# Patient Record
Sex: FEMALE | Race: WHITE | NOT HISPANIC OR LATINO | Employment: OTHER | URBAN - METROPOLITAN AREA
[De-identification: names, ages, dates, MRNs, and addresses within clinical notes are randomized per-mention and may not be internally consistent; named-entity substitution may affect disease eponyms.]

---

## 2017-01-20 ENCOUNTER — DOCUMENTATION ONLY (OUTPATIENT)
Dept: FAMILY MEDICINE | Facility: CLINIC | Age: 52
End: 2017-01-20

## 2017-01-23 ENCOUNTER — OFFICE VISIT (OUTPATIENT)
Dept: FAMILY MEDICINE | Facility: CLINIC | Age: 52
End: 2017-01-23
Payer: COMMERCIAL

## 2017-01-23 VITALS
SYSTOLIC BLOOD PRESSURE: 114 MMHG | WEIGHT: 274.5 LBS | BODY MASS INDEX: 41.6 KG/M2 | TEMPERATURE: 99 F | DIASTOLIC BLOOD PRESSURE: 75 MMHG | HEIGHT: 68 IN | HEART RATE: 79 BPM

## 2017-01-23 DIAGNOSIS — Z23 IMMUNIZATION DUE: ICD-10-CM

## 2017-01-23 DIAGNOSIS — E66.01 OBESITY, CLASS III, BMI 40-49.9 (MORBID OBESITY): ICD-10-CM

## 2017-01-23 DIAGNOSIS — Z00.00 ROUTINE MEDICAL EXAM: Primary | ICD-10-CM

## 2017-01-23 PROCEDURE — 99999 PR PBB SHADOW E&M-EST. PATIENT-LVL III: CPT | Mod: PBBFAC,,, | Performed by: FAMILY MEDICINE

## 2017-01-23 PROCEDURE — 90471 IMMUNIZATION ADMIN: CPT | Mod: S$GLB,,, | Performed by: FAMILY MEDICINE

## 2017-01-23 PROCEDURE — 90715 TDAP VACCINE 7 YRS/> IM: CPT | Mod: S$GLB,,, | Performed by: FAMILY MEDICINE

## 2017-01-23 PROCEDURE — 99396 PREV VISIT EST AGE 40-64: CPT | Mod: 25,S$GLB,, | Performed by: FAMILY MEDICINE

## 2017-01-23 RX ORDER — NAPROXEN SODIUM 220 MG
220 TABLET ORAL 2 TIMES DAILY PRN
COMMUNITY
End: 2018-03-23

## 2017-01-23 NOTE — MR AVS SNAPSHOT
[facility]  [address]  [place] [address]  Phone: [phone]  Fax: [phone]                  Leandra CORTES Moses   2017 3:40 PM   Office Visit    Description:  Female : 1965   Provider:  Rohini Bella MD   Department:  [facility]           Reason for Visit     Annual Exam           Diagnoses this Visit        Comments    Routine medical exam    -  Primary     Immunization due                To Do List           Future Appointments        Provider Department Dept Phone    2017 8:30 AM [facility], INT MED INJ [facility] [phone]    2017 10:00 AM LAB, [facility] [phone]    3/25/2017 8:30 AM [facility], INT MED INJ [facility] [phone]      Goals (5 Years of Data)     None      Follow-Up and Disposition     Return in about 1 year (around 2018) for routine medical exam.      [facility] On Call     [facility] On Call Nurse [facility] -  Assistance  Registered nurses in the [facility] Call Center provide clinical advisement, health education, appointment booking, and other advisory services.  Call for this free service at [phone].             Medications           Message regarding Medications     Verify the changes and/or additions to your medication regime listed below are the same as discussed with your clinician today.  If any of these changes or additions are incorrect, please notify your healthcare provider.             Verify that the below list of medications is an accurate representation of the medications you are currently taking.  If none reported, the list may be blank. If incorrect, please contact your healthcare provider. Carry this list with you in case of emergency.           Current Medications     anastrozole (ARIMIDEX) 1 mg Tab Take 1 mg by mouth once daily.    glucosam-chond-hyalu-CF borate (MOVE FREE Snagsta) 750 mg-100 mg- 1.65 mg-108 mg Tab Take 1 tablet by  "mouth once daily.    multivitamin capsule Take 1 capsule by mouth once daily.    naproxen sodium (ALEVE) 220 MG tablet Take 220 mg by mouth 2 (two) times daily as needed.    zinc gluconate 50 mg tablet Take 50 mg by mouth once daily.           Clinical Reference Information           Vital Signs - Last Recorded  Most recent update: 1/23/2017  3:41 PM by Brigid Carney MA    BP Pulse Temp Ht Wt LMP    114/75 79 98.6 °F (37 °C) (Oral) 5' 7.5" (1.715 m) 124.5 kg (274 lb 7.6 oz) 02/13/2014    BMI                42.35 kg/m2          Blood Pressure          Most Recent Value    BP  114/75      Allergies as of 1/23/2017     Morphine    Adhesive    Aspirin    Penicillin G    Corticosteroids (Glucocorticoids)    Morphine Sulfate      Immunizations Administered on Date of Encounter - 1/23/2017     Name Date Dose VIS Date Route    Pneumococcal Conjugate - 13 Valent  Incomplete 0.5 mL 11/5/2015 Intramuscular    Pneumococcal Polysaccharide - 23 Valent  Incomplete 0.5 mL 4/24/2015 Intramuscular    TDAP 1/23/2017 0.5 mL 2/24/2015 Intramuscular      Orders Placed During Today's Visit      Normal Orders This Visit    Pneumococcal Conjugate Vaccine (13 Valent) (IM)     Pneumococcal Polysaccharide Vaccine (23 Valent) (SQ/IM)     Tdap Vaccine (Adult)     Future Labs/Procedures Expected by Expires    CBC auto differential  1/23/2017 3/24/2018    Comprehensive metabolic panel  1/23/2017 3/24/2018    Lipid panel  1/23/2017 3/24/2018    TSH  1/23/2017 3/24/2018      "

## 2017-01-23 NOTE — PROGRESS NOTES
CHIEF COMPLAINT:  Annual exam      HISTORY OF PRESENT ILLNESS:  Leandra Lopes is a 51 y.o. female who presents to clinic for an annual exam.  She is due for screening labs. She does not know if her cervix was removed with her hysterectomy.    HEALTH MAINTENANCE:  1. Patient does not receive mammograms  2. Influenza up to date  3. prevnar and pneumovax are not up to date  4. Colonoscopy , repeat in 3 years  5. Patient follows up with dentist, optometrist        REVIEW OF SYSTEMS:  The patient denies any fever, chills, night sweats, headaches, vision changes, difficulty speaking or swallowing, decreased hearing, weight loss, weight gain, chest pain, palpitations, shortness of breath, cough, nausea, vomiting, abdominal pain, dysuria, diarrhea, constipation, hematuria, hematochezia, melena, changes in her hair, skin, nails, numbness or weakness in her extremities, erythema,  swelling over any of her joints, myalgias, swollen glands, easy bruising, fatigue, edema, symptoms of anxiety or depression. She denies any vaginal discharge, breast masses, nipple discharge, change in the skin overlying her breasts.      MEDICATIONS:   Reviewed and/or reconciled in EPIC    ALLERGIES:  Reviewed and/or reconciled in [facility]    PAST MEDICAL/SURGICAL HISTORY:   Past Medical History   Diagnosis Date    Breast cancer 2013     left breast infiltrating ductal carcinoma, ER/NH positive, Her2 negative    SADIE (obstructive sleep apnea)      on cpap      Past Surgical History   Procedure Laterality Date    Tonsillectomy      Tubal ligation      Cholecystectomy      Breast biopsy  2013     left breast- IDC    Breast surgery  10//     bilateral mastectomy and reconstruction-no implants     section       x3    Colon surgery  2009     benign neoplasm recheck 2014, 12 inches    Hysterectomy       TVH/BSO, benign       FAMILY HISTORY:    Family History   Problem Relation Age of Onset    Cancer Mother      liver, bone,  "breast    Hypertension Mother     Heart disease Mother     Breast cancer Mother 63     initial dx ILC at 63 with recurrence at 67, 2nd CA IDC at 67    Cancer Father 62     bone ? breast    Diabetes Father     Breast cancer Maternal Aunt 50    Ovarian cancer Cousin 49     daughter of M Aunt with Breast CA       SOCIAL HISTORY:    Social History     Social History    Marital status:      Spouse name: N/A    Number of children: N/A    Years of education: N/A     Occupational History    Not on file.     Social History Main Topics    Smoking status: Never Smoker    Smokeless tobacco: Never Used    Alcohol use Yes      Comment: rarely    Drug use: No    Sexual activity: Yes     Partners: Male     Birth control/ protection: Surgical     Other Topics Concern    Not on file     Social History Narrative     She tries to eat a healthy diet, she does not get any regular exercise during the school year but does swim in the summer.    PHYSICAL EXAM:  VITAL SIGNS:   Vitals:    01/23/17 1537   BP: 114/75   Pulse: 79   Temp: 98.6 °F (37 °C)   TempSrc: Oral   Weight: 124.5 kg (274 lb 7.6 oz)   Height: 5' 7.5" (1.715 m)     GENERAL:  Patient appears well nourished, sitting on exam table, in no acute distress.  HEENT:  Atraumatic, normocephalic, PERRLA, EOMI, no conjunctival injection, sclerae are anicteric, normal external auditory canals,TMs clear b/l, gross hearing intact to whisper, MMM, no oropharygneal erythema or exudate.  NECK:  Supple, normal ROM, trachea is midline , no supraclavicular or cervical LAD or masses palpated.  Thyroid gland not palpable.  CARDIOVASCULAR:  RRR, normal S1 and S2, no m/r/g.  RESPIRATORY:  CTA b/l, no wheezes, rhonchi, rales.  No increased work of breathing, no  use of accessory muscles.  ABDOMEN:  Soft, nontender, nondistended, normoactive bowel sounds in all four quadrants, no rebound or guarding, no HSM or masses palpated.  Normal percussion.  EXTREMITIES:  2+ DP pulses b/l, " no edema.  SKIN:  Warm, no lesions on exposed skin.  NEUROMUSCULAR:  No tenderness over cervical, thoracic, lumbar spinous processes, paraspinal muscles, SI joints. Normal ROM over cervical and lumbar spines.  4+/5 hip, knee, ankle flexors/extensors  b/l.  2+ patellar and achilles reflexes b/l.  Steady gait.  Patient is able to walk on heels and toes. Cranial nerves II-XII grossly intact.  No clubbing or cyanosis of digits/nails.      PSYCH:  Patient is alert and oriented to person, time, place. They are appropriately dressed and groomed. There is normal eye contact. Rate and tone of speech is normal. Normal insight, judgement. Normal thought content and process.     LABORATORY/IMAGING STUDIES: pending    ASSESSMENT/PLAN: This is a 51 y.o. female who presents to clinic for routine medical exam  1. Routine medical exam, immunization due: We will check a screening CBC, CMP, TSH, fasting lipid panel. Patient will be notified of these results and the need for any further evaluation and treatment.  She will follow up with her dentist/optometrist as scheduled. We discussed the importance of increasing her exercise, continuing with a healthy, well-balanced diet. She declines pneumovax and prevnar at this time, but will call to schedule a nursing visit. She will receive an adacel today  2. Obesity: see below      Patient readiness: acceptance and barriers:none    During the course of the visit the patient was educated and counseled about the following:     Obesity:   General weight loss/lifestyle modification strategies discussed (elicit support from others; identify saboteurs; non-food rewards, etc).  Diet interventions: moderate (500 kCal/d) deficit diet.  Informal exercise measures discussed, e.g. taking stairs instead of elevator.  Regular aerobic exercise program discussed.    Goals: Obesity: Reduce calorie intake and BM    Did patient meet goals/outcomes: No    The following self management tools provided:  declined    Patient Instructions (the written plan) was given to the patient/family.     Time spent with patient: 30 minutes        Rohini Bella MD

## 2017-01-28 ENCOUNTER — LAB VISIT (OUTPATIENT)
Dept: LAB | Facility: HOSPITAL | Age: 52
End: 2017-01-28
Attending: FAMILY MEDICINE
Payer: COMMERCIAL

## 2017-01-28 DIAGNOSIS — Z00.00 ROUTINE MEDICAL EXAM: ICD-10-CM

## 2017-01-28 LAB
ALBUMIN SERPL BCP-MCNC: 3.8 G/DL
ALP SERPL-CCNC: 129 U/L
ALT SERPL W/O P-5'-P-CCNC: 20 U/L
ANION GAP SERPL CALC-SCNC: 9 MMOL/L
AST SERPL-CCNC: 19 U/L
BASOPHILS # BLD AUTO: 0.01 K/UL
BASOPHILS NFR BLD: 0.2 %
BILIRUB SERPL-MCNC: 0.6 MG/DL
BUN SERPL-MCNC: 18 MG/DL
CALCIUM SERPL-MCNC: 9.3 MG/DL
CHLORIDE SERPL-SCNC: 107 MMOL/L
CHOLEST/HDLC SERPL: 4.3 {RATIO}
CO2 SERPL-SCNC: 27 MMOL/L
CREAT SERPL-MCNC: 0.7 MG/DL
DIFFERENTIAL METHOD: ABNORMAL
EOSINOPHIL # BLD AUTO: 0.2 K/UL
EOSINOPHIL NFR BLD: 2.8 %
ERYTHROCYTE [DISTWIDTH] IN BLOOD BY AUTOMATED COUNT: 13.9 %
EST. GFR  (AFRICAN AMERICAN): >60 ML/MIN/1.73 M^2
EST. GFR  (NON AFRICAN AMERICAN): >60 ML/MIN/1.73 M^2
GLUCOSE SERPL-MCNC: 94 MG/DL
HCT VFR BLD AUTO: 40.2 %
HDL/CHOLESTEROL RATIO: 23.3 %
HDLC SERPL-MCNC: 163 MG/DL
HDLC SERPL-MCNC: 38 MG/DL
HGB BLD-MCNC: 12.7 G/DL
LDLC SERPL CALC-MCNC: 105.8 MG/DL
LYMPHOCYTES # BLD AUTO: 1.8 K/UL
LYMPHOCYTES NFR BLD: 33 %
MCH RBC QN AUTO: 28.8 PG
MCHC RBC AUTO-ENTMCNC: 31.6 %
MCV RBC AUTO: 91 FL
MONOCYTES # BLD AUTO: 0.5 K/UL
MONOCYTES NFR BLD: 10 %
NEUTROPHILS # BLD AUTO: 2.9 K/UL
NEUTROPHILS NFR BLD: 54 %
NONHDLC SERPL-MCNC: 125 MG/DL
PLATELET # BLD AUTO: 191 K/UL
PMV BLD AUTO: 12.1 FL
POTASSIUM SERPL-SCNC: 4.3 MMOL/L
PROT SERPL-MCNC: 6.9 G/DL
RBC # BLD AUTO: 4.41 M/UL
SODIUM SERPL-SCNC: 143 MMOL/L
TRIGL SERPL-MCNC: 96 MG/DL
TSH SERPL DL<=0.005 MIU/L-ACNC: 0.82 UIU/ML
WBC # BLD AUTO: 5.39 K/UL

## 2017-01-28 PROCEDURE — 80061 LIPID PANEL: CPT

## 2017-01-28 PROCEDURE — 36415 COLL VENOUS BLD VENIPUNCTURE: CPT | Mod: PO

## 2017-01-28 PROCEDURE — 80053 COMPREHEN METABOLIC PANEL: CPT

## 2017-01-28 PROCEDURE — 85025 COMPLETE CBC W/AUTO DIFF WBC: CPT

## 2017-01-28 PROCEDURE — 84443 ASSAY THYROID STIM HORMONE: CPT

## 2017-03-22 ENCOUNTER — OFFICE VISIT (OUTPATIENT)
Dept: PODIATRY | Facility: CLINIC | Age: 52
End: 2017-03-22
Payer: COMMERCIAL

## 2017-03-22 VITALS — WEIGHT: 276 LBS | HEIGHT: 68 IN | BODY MASS INDEX: 41.83 KG/M2

## 2017-03-22 DIAGNOSIS — B35.1 ONYCHOMYCOSIS DUE TO DERMATOPHYTE: Primary | ICD-10-CM

## 2017-03-22 PROCEDURE — 1160F RVW MEDS BY RX/DR IN RCRD: CPT | Mod: S$GLB,,, | Performed by: PODIATRIST

## 2017-03-22 PROCEDURE — 99999 PR PBB SHADOW E&M-EST. PATIENT-LVL II: CPT | Mod: PBBFAC,,, | Performed by: PODIATRIST

## 2017-03-22 PROCEDURE — 99203 OFFICE O/P NEW LOW 30 MIN: CPT | Mod: S$GLB,,, | Performed by: PODIATRIST

## 2017-03-22 RX ORDER — CICLOPIROX 80 MG/ML
SOLUTION TOPICAL NIGHTLY
Qty: 6.6 ML | Refills: 11 | Status: SHIPPED | OUTPATIENT
Start: 2017-03-22 | End: 2018-03-23

## 2017-03-22 NOTE — MR AVS SNAPSHOT
[facility]  [address]  [place] [address]  Phone: [phone]                  Leandra CORTES Moses   3/22/2017 2:45 PM   Office Visit    Description:  Female : 1965   Provider:  Roberto Carlos Gamble DPM   Department:  [facility]           Reason for Visit     Nail Problem           Diagnoses this Visit        Comments    Onychomycosis due to dermatophyte    -  Primary            To Do List           Future Appointments        Provider Department Dept Phone    3/22/2017 2:45 PM Roberto Carlos Gamble DPM [facility] [phone]    3/25/2017 8:30 AM [facility] INT MED INJ [facility] [phone]      Goals (5 Years of Data)     None      Follow-Up and Disposition     Return if symptoms worsen or fail to improve.       These Medications        Disp Refills Start End    ciclopirox (PENLAC) 8 % Soln 6.6 mL 11 3/22/2017     Apply topically nightly. - Topical    Pharmacy: [facility]/pharmacy #[id number] - [place] - [address]  Ph #: [phone]         [facility] On Call     [facility] On Call Nurse Care Line -  Assistance  Registered nurses in the [facility] On Call Center provide clinical advisement, health education, appointment booking, and other advisory services.  Call for this free service at [phone].             Medications           Message regarding Medications     Verify the changes and/or additions to your medication regime listed below are the same as discussed with your clinician today.  If any of these changes or additions are incorrect, please notify your healthcare provider.        START taking these NEW medications        Refills    ciclopirox (PENLAC) 8 % Soln 11    Sig: Apply topically nightly.    Class: Normal    Route: Topical           Verify that the below list of medications is an accurate representation of the medications you are currently taking.  If none reported, the list may be blank. If incorrect, please contact your healthcare provider.  "Carry this list with you in case of emergency.           Current Medications     anastrozole (ARIMIDEX) 1 mg Tab Take 1 mg by mouth once daily.    ciclopirox (PENLAC) 8 % Soln Apply topically nightly.    glucosam-chond-hyalu-CF borate (MOVE FREE Mobypark) 750 mg-100 mg- 1.65 mg-108 mg Tab Take 1 tablet by mouth once daily.    multivitamin capsule Take 1 capsule by mouth once daily.    naproxen sodium (ALEVE) 220 MG tablet Take 220 mg by mouth 2 (two) times daily as needed.    zinc gluconate 50 mg tablet Take 50 mg by mouth once daily.           Clinical Reference Information           Your Vitals Were     Height Weight Last Period BMI       5' 7.5" (1.715 m) 125.2 kg (276 lb 0.3 oz) 02/13/2014 42.59 kg/m2       Allergies as of 3/22/2017     Morphine    Adhesive    Aspirin    Penicillin G    Corticosteroids (Glucocorticoids)    Morphine Sulfate      Immunizations Administered on Date of Encounter - 3/22/2017     None      Language Assistance Services     ATTENTION: Language assistance services are available, free of charge. Please call [phone].      ATENCIÓN: Si timi damon, tiene a omalley disposición servicios gratuitos de asistencia lingüística. Llame al [phone].     LAURA Ý: N?u b?n nói Ti?ng Vi?t, có các d?ch v? h? tr? ngôn ng? mi?n phí dành cho b?n. G?i s? [phone].         [facility] complies with applicable Federal civil rights laws and does not discriminate on the basis of race, color, national origin, age, disability, or sex.        "

## 2017-03-22 NOTE — PROGRESS NOTES
Subjective:      Patient ID: Leandra Lopes is a 51 y.o. female.    Chief Complaint: Nail Problem (discolored Lt great toe )    Discoloration left hallux and 2nd toenails.  Gradual onset, worsening over past several months-year, aggravated by increased weight bearing, shoe gear, pressure.  No previous medical treatment.  OTC pain med not helping.  Relates trauma left hallux a long time ago healed well with some residual nail changes.  Denies surgery left hallux/2nd toe.    Review of Systems   Constitution: Negative for chills, diaphoresis, fever, malaise/fatigue and night sweats.   Cardiovascular: Negative for claudication, cyanosis, leg swelling and syncope.   Skin: Positive for nail changes. Negative for color change, dry skin, rash, suspicious lesions and unusual hair distribution.   Musculoskeletal: Negative for falls, joint pain, joint swelling, muscle cramps, muscle weakness and stiffness.   Gastrointestinal: Negative for constipation, diarrhea, nausea and vomiting.   Neurological: Negative for brief paralysis, disturbances in coordination, focal weakness, numbness, paresthesias, sensory change and tremors.           Objective:      Physical Exam   Constitutional: She appears well-developed and well-nourished. She is cooperative. No distress.   Cardiovascular:   Pulses:       Popliteal pulses are 2+ on the right side, and 2+ on the left side.        Dorsalis pedis pulses are 2+ on the right side, and 2+ on the left side.        Posterior tibial pulses are 2+ on the right side, and 2+ on the left side.   Capillary refill 3 seconds all toes/distal feet, all toes/both feet warm to touch.      Negative lymphadenopathy bilateral popliteal fossa and tarsal tunnel.      Negavie lower extremity edema bilateral.     Musculoskeletal:        Right ankle: Normal. She exhibits normal range of motion, no swelling, no ecchymosis, no deformity, no laceration and normal pulse. Achilles tendon normal. Achilles tendon exhibits no  pain, no defect and normal Oliveros's test results.   Normal angle, base, station of gait. All ten toes without clubbing, cyanosis, or signs of ischemia.  No pain to palpation bilateral lower extremities.  Range of motion, stability, muscle strength, and muscle tone normal bilateral feet and legs.     Lymphadenopathy: No inguinal adenopathy noted on the right or left side.   Negative lymphadenopathy bilateral popliteal fossa and tarsal tunnel.   Neurological: She is alert. She has normal strength. She displays no atrophy and no tremor. No sensory deficit. She exhibits normal muscle tone. She displays no seizure activity. Gait normal.   Reflex Scores:       Patellar reflexes are 2+ on the right side and 2+ on the left side.       Achilles reflexes are 2+ on the right side and 2+ on the left side.  Negative tinel sign to percussion sural, superficial peroneal, deep peroneal, saphenous, and posterior tibial nerves right and left ankles and feet.     Skin: Skin is warm, dry and intact. No abrasion, no bruising, no burn, no ecchymosis, no laceration, no lesion and no rash noted. She is not diaphoretic. No cyanosis or erythema. No pallor. Nails show no clubbing.     Skin is normal age and health appropriate color, turgor, texture, and temperature bilateral lower extremities without ulceration, hyperpigmentation, discoloration, masses nodules or cords palpated.  No ecchymosis, erythema, edema, or cardinal signs of infection bilateral lower extremities.    Toenails 1st, 2nd  left are  discolored tanish brown with minimal tan crumbly subungual debris.  Neatly trimmed and not tender to distal nail plate pressure, without periungual skin abnormality of each.               Assessment:       Encounter Diagnosis   Name Primary?    Onychomycosis due to dermatophyte Yes         Plan:       Leandra was seen today for nail problem.    Diagnoses and all orders for this visit:    Onychomycosis due to dermatophyte    Other orders  -      ciclopirox (PENLAC) 8 % Soln; Apply topically nightly.      I counseled the patient on her conditions, their implications and medical management.        Discussed conservative treatment with shoes of adequate dimensions, material, and style to alleviate symptoms and delay or prevent surgical intervention.    Rx penlac.    Discussed and patient verbalizes understanding that changes from old trauma will not improve with antifungal therapy.          Return if symptoms worsen or fail to improve.

## 2017-07-24 ENCOUNTER — HOSPITAL ENCOUNTER (OUTPATIENT)
Dept: RADIOLOGY | Facility: HOSPITAL | Age: 52
Discharge: HOME | End: 2017-07-24
Attending: SPECIALIST
Payer: COMMERCIAL

## 2017-07-24 DIAGNOSIS — Z78.0 MENOPAUSE: ICD-10-CM

## 2017-07-24 DIAGNOSIS — Z78.0 MENOPAUSE: Primary | ICD-10-CM

## 2017-07-24 PROCEDURE — 77080 DXA BONE DENSITY AXIAL: CPT | Mod: 26,,, | Performed by: RADIOLOGY

## 2017-07-24 PROCEDURE — 77080 DXA BONE DENSITY AXIAL: CPT | Mod: TC

## 2018-03-21 ENCOUNTER — DOCUMENTATION ONLY (OUTPATIENT)
Dept: FAMILY MEDICINE | Facility: CLINIC | Age: 53
End: 2018-03-21

## 2018-03-21 NOTE — PROGRESS NOTES
Pre-Visit Chart Review  For Appointment Scheduled on 3/23/18.    Health Maintenance Due   Topic Date Due    Pneumococcal PCV13 (High Risk) (1 - PCV13 Required) 07/30/1966    Pneumococcal PPSV23 (High Risk) (1) 07/30/1983    Influenza Vaccine  08/01/2017

## 2018-03-23 ENCOUNTER — OFFICE VISIT (OUTPATIENT)
Dept: FAMILY MEDICINE | Facility: CLINIC | Age: 53
End: 2018-03-23
Payer: COMMERCIAL

## 2018-03-23 VITALS
BODY MASS INDEX: 41.26 KG/M2 | WEIGHT: 272.25 LBS | TEMPERATURE: 99 F | SYSTOLIC BLOOD PRESSURE: 114 MMHG | HEART RATE: 85 BPM | HEIGHT: 68 IN | DIASTOLIC BLOOD PRESSURE: 75 MMHG

## 2018-03-23 DIAGNOSIS — E66.01 OBESITY, CLASS III, BMI 40-49.9 (MORBID OBESITY): ICD-10-CM

## 2018-03-23 DIAGNOSIS — Z00.00 ROUTINE MEDICAL EXAM: Primary | ICD-10-CM

## 2018-03-23 PROCEDURE — 99999 PR PBB SHADOW E&M-EST. PATIENT-LVL III: CPT | Mod: PBBFAC,,, | Performed by: FAMILY MEDICINE

## 2018-03-23 PROCEDURE — 99396 PREV VISIT EST AGE 40-64: CPT | Mod: S$GLB,,, | Performed by: FAMILY MEDICINE

## 2018-03-23 RX ORDER — MELOXICAM 15 MG/1
15 TABLET ORAL DAILY
Qty: 30 TABLET | Refills: 11 | Status: SHIPPED | OUTPATIENT
Start: 2018-03-23 | End: 2019-01-23 | Stop reason: SDUPTHER

## 2018-03-23 NOTE — PROGRESS NOTES
CHIEF COMPLAINT:  Annual exam      HISTORY OF PRESENT ILLNESS:  Leandra Lopes is a 52 y.o. female who presents to clinic for an annual exam.  She is due for screening labs. She states that she will call us to set up PT for her back pain in . She is going to the gym once a week, trying to eat a healthy diet.    HEALTH MAINTENANCE:  1. Patient does not receive mammograms  2. adacel up to date  3. prevnar and pneumovax were done at [facility] in   4. Colonoscopy , repeat in 5 years  5. Patient follows up with dentist, optometrist        REVIEW OF SYSTEMS:  The patient denies any fever, chills, night sweats, headaches, vision changes, difficulty speaking or swallowing, decreased hearing, weight loss, weight gain, chest pain, palpitations, shortness of breath, cough, nausea, vomiting, abdominal pain, dysuria, diarrhea, constipation, hematuria, hematochezia, melena, changes in her hair, skin, nails, numbness or weakness in her extremities, erythema,  swelling over any of her joints, myalgias, swollen glands, easy bruising, fatigue, edema, symptoms of anxiety or depression. She denies any vaginal discharge, breast masses, nipple discharge, change in the skin overlying her breasts.      MEDICATIONS:   Reviewed and/or reconciled in EPIC    ALLERGIES:  Reviewed and/or reconciled in [facility]    PAST MEDICAL/SURGICAL HISTORY:   Past Medical History:   Diagnosis Date    Breast cancer 2013    left breast infiltrating ductal carcinoma, ER/DE positive, Her2 negative    SADIE (obstructive sleep apnea)     on cpap      Past Surgical History:   Procedure Laterality Date    BREAST BIOPSY  2013    left breast- IDC    BREAST SURGERY  10//    bilateral mastectomy and reconstruction-no implants     SECTION      x3    CHOLECYSTECTOMY      COLON SURGERY  2009    benign neoplasm recheck 2014, 12 inches    HYSTERECTOMY      TVH/BSO, benign    TONSILLECTOMY      TUBAL LIGATION         FAMILY HISTORY:    Family  "History   Problem Relation Age of Onset    Cancer Mother      liver, bone, breast    Hypertension Mother     Heart disease Mother     Breast cancer Mother 63     initial dx ILC at 63 with recurrence at 67, 2nd CA IDC at 67    Cancer Father 62     bone ? breast    Diabetes Father     Breast cancer Maternal Aunt 50    Ovarian cancer Cousin 49     daughter of M Aunt with Breast CA       SOCIAL HISTORY:    Social History     Social History    Marital status:      Spouse name: N/A    Number of children: N/A    Years of education: N/A     Occupational History    Not on file.     Social History Main Topics    Smoking status: Never Smoker    Smokeless tobacco: Never Used    Alcohol use Yes      Comment: rarely    Drug use: No    Sexual activity: Yes     Partners: Male     Birth control/ protection: Surgical     Other Topics Concern    Not on file     Social History Narrative    No narrative on file       PHYSICAL EXAM:  VITAL SIGNS:   Vitals:    03/23/18 1542   BP: 114/75   BP Location: Right arm   Patient Position: Sitting   BP Method: Large (Automatic)   Pulse: 85   Temp: 98.6 °F (37 °C)   TempSrc: Oral   Weight: 123.5 kg (272 lb 4.3 oz)   Height: 5' 7.5" (1.715 m)     GENERAL:  Patient appears well nourished, sitting on exam table, in no acute distress.  HEENT:  Atraumatic, normocephalic, PERRLA, EOMI, no conjunctival injection, sclerae are anicteric, normal external auditory canals,TMs clear b/l, gross hearing intact to whisper, MMM, no oropharygneal erythema or exudate.  NECK:  Supple, normal ROM, trachea is midline , no supraclavicular or cervical LAD or masses palpated.  Thyroid gland not palpable.  CARDIOVASCULAR:  RRR, normal S1 and S2, no m/r/g.  RESPIRATORY:  CTA b/l, no wheezes, rhonchi, rales.  No increased work of breathing, no  use of accessory muscles.  ABDOMEN:  Soft, nontender, nondistended, normoactive bowel sounds in all four quadrants, no rebound or guarding, no HSM or masses " palpated.  Normal percussion.  EXTREMITIES:  2+ DP pulses b/l, no edema.  SKIN:  Warm, no lesions on exposed skin.  NEUROMUSCULAR:  No tenderness over cervical, thoracic, lumbar spinous processes, paraspinal muscles, SI joints. Normal ROM over cervical and lumbar spines.  4+/5 hip, knee, ankle flexors/extensors  b/l.  2+ patellar and achilles reflexes b/l.  Steady gait.  Patient is able to walk on heels and toes. Cranial nerves II-XII grossly intact.  No clubbing or cyanosis of digits/nails.      PSYCH:  Patient is alert and oriented to person, time, place. They are appropriately dressed and groomed. There is normal eye contact. Rate and tone of speech is normal. Normal insight, judgement. Normal thought content and process.     LABORATORY/IMAGING STUDIES: pending    ASSESSMENT/PLAN: This is a 52 y.o. female who presents to clinic for routine medical exam  1. Routine medical exam   We will check a screening CBC, CMP, TSH, fasting lipid panel. Patient will be notified of these results and the need for any further evaluation and treatment. She will follow up with her OB/GYN for her annual exam. She will follow up with her dentist/optometrist as scheduled. We discussed the importance of increasing her exercise, continuing with a healthy, well-balanced diet.   2. Morbid obesity  See below  Patient readiness: acceptance and barriers:none    During the course of the visit the patient was educated and counseled about the following:     Obesity:   General weight loss/lifestyle modification strategies discussed (elicit support from others; identify saboteurs; non-food rewards, etc).  Diet interventions: moderate (500 kCal/d) deficit diet.  Informal exercise measures discussed, e.g. taking stairs instead of elevator.  Regular aerobic exercise program discussed.  Ideal protein brochure    Goals: Obesity: Reduce calorie intake and BM    Did patient meet goals/outcomes: No    The following self management tools provided:  declined    Patient Instructions (the written plan) was given to the patient/family.     Time spent with patient: 30 minutes        Rohini Bella MD

## 2018-03-24 ENCOUNTER — LAB VISIT (OUTPATIENT)
Dept: LAB | Facility: HOSPITAL | Age: 53
End: 2018-03-24
Attending: FAMILY MEDICINE
Payer: COMMERCIAL

## 2018-03-24 DIAGNOSIS — Z00.00 ROUTINE MEDICAL EXAM: ICD-10-CM

## 2018-03-24 LAB
ALBUMIN SERPL BCP-MCNC: 3.6 G/DL
ALP SERPL-CCNC: 131 U/L
ALT SERPL W/O P-5'-P-CCNC: 20 U/L
ANION GAP SERPL CALC-SCNC: 8 MMOL/L
AST SERPL-CCNC: 17 U/L
BASOPHILS # BLD AUTO: 0 K/UL
BASOPHILS NFR BLD: 0.4 %
BILIRUB SERPL-MCNC: 0.7 MG/DL
BUN SERPL-MCNC: 18 MG/DL
CALCIUM SERPL-MCNC: 9.8 MG/DL
CHLORIDE SERPL-SCNC: 105 MMOL/L
CHOLEST SERPL-MCNC: 174 MG/DL
CHOLEST/HDLC SERPL: 4.8 {RATIO}
CO2 SERPL-SCNC: 29 MMOL/L
CREAT SERPL-MCNC: 0.7 MG/DL
DIFFERENTIAL METHOD: NORMAL
EOSINOPHIL # BLD AUTO: 0.1 K/UL
EOSINOPHIL NFR BLD: 1.5 %
ERYTHROCYTE [DISTWIDTH] IN BLOOD BY AUTOMATED COUNT: 14.2 %
EST. GFR  (AFRICAN AMERICAN): >60 ML/MIN/1.73 M^2
EST. GFR  (NON AFRICAN AMERICAN): >60 ML/MIN/1.73 M^2
GLUCOSE SERPL-MCNC: 96 MG/DL
HCT VFR BLD AUTO: 38.2 %
HDLC SERPL-MCNC: 36 MG/DL
HDLC SERPL: 20.7 %
HGB BLD-MCNC: 12.5 G/DL
LDLC SERPL CALC-MCNC: 111.2 MG/DL
LYMPHOCYTES # BLD AUTO: 1.9 K/UL
LYMPHOCYTES NFR BLD: 29.9 %
MCH RBC QN AUTO: 29.2 PG
MCHC RBC AUTO-ENTMCNC: 32.8 G/DL
MCV RBC AUTO: 89 FL
MONOCYTES # BLD AUTO: 0.4 K/UL
MONOCYTES NFR BLD: 6.9 %
NEUTROPHILS # BLD AUTO: 3.9 K/UL
NEUTROPHILS NFR BLD: 61.3 %
NONHDLC SERPL-MCNC: 138 MG/DL
PLATELET # BLD AUTO: 182 K/UL
PMV BLD AUTO: 9.4 FL
POTASSIUM SERPL-SCNC: 3.9 MMOL/L
PROT SERPL-MCNC: 7 G/DL
RBC # BLD AUTO: 4.29 M/UL
SODIUM SERPL-SCNC: 142 MMOL/L
TRIGL SERPL-MCNC: 134 MG/DL
TSH SERPL DL<=0.005 MIU/L-ACNC: 0.86 UIU/ML
WBC # BLD AUTO: 6.4 K/UL

## 2018-03-24 PROCEDURE — 80053 COMPREHEN METABOLIC PANEL: CPT

## 2018-03-24 PROCEDURE — 85025 COMPLETE CBC W/AUTO DIFF WBC: CPT

## 2018-03-24 PROCEDURE — 36415 COLL VENOUS BLD VENIPUNCTURE: CPT

## 2018-03-24 PROCEDURE — 84443 ASSAY THYROID STIM HORMONE: CPT

## 2018-03-24 PROCEDURE — 80061 LIPID PANEL: CPT

## 2018-06-04 ENCOUNTER — TELEPHONE (OUTPATIENT)
Dept: FAMILY MEDICINE | Facility: CLINIC | Age: 53
End: 2018-06-04

## 2018-06-04 DIAGNOSIS — M54.6 CHRONIC MIDLINE THORACIC BACK PAIN: ICD-10-CM

## 2018-06-04 DIAGNOSIS — G89.29 CHRONIC MIDLINE THORACIC BACK PAIN: ICD-10-CM

## 2018-06-04 DIAGNOSIS — G89.29 CHRONIC MIDLINE LOW BACK PAIN, WITH SCIATICA PRESENCE UNSPECIFIED: Primary | ICD-10-CM

## 2018-06-04 DIAGNOSIS — M54.5 CHRONIC MIDLINE LOW BACK PAIN, WITH SCIATICA PRESENCE UNSPECIFIED: Primary | ICD-10-CM

## 2018-06-04 NOTE — TELEPHONE ENCOUNTER
----- Message from Cuca Vazquez sent at 6/4/2018  8:48 AM CDT -----  Type: Needs Medical Advice    Who Called:  self  Symptoms (please be specific):  Physical therapy orders   How long has patient had these symptoms:  [place] [facility]  Pharmacy name and phone #:     Best Call Back Number: [phone]  Additional Information: had discussed with Dr Bella / ready to get started

## 2019-01-23 RX ORDER — MELOXICAM 15 MG/1
15 TABLET ORAL DAILY
Qty: 30 TABLET | Refills: 2 | Status: SHIPPED | OUTPATIENT
Start: 2019-01-23 | End: 2019-04-25 | Stop reason: SDUPTHER

## 2019-04-02 ENCOUNTER — CLINICAL SUPPORT (OUTPATIENT)
Dept: URGENT CARE | Facility: CLINIC | Age: 54
End: 2019-04-02
Payer: COMMERCIAL

## 2019-04-02 VITALS
TEMPERATURE: 97 F | BODY MASS INDEX: 42.59 KG/M2 | HEART RATE: 75 BPM | WEIGHT: 281 LBS | SYSTOLIC BLOOD PRESSURE: 124 MMHG | HEIGHT: 68 IN | DIASTOLIC BLOOD PRESSURE: 77 MMHG | RESPIRATION RATE: 20 BRPM | OXYGEN SATURATION: 99 %

## 2019-04-02 DIAGNOSIS — J01.90 ACUTE NON-RECURRENT SINUSITIS, UNSPECIFIED LOCATION: Primary | ICD-10-CM

## 2019-04-02 PROCEDURE — 96372 PR INJECTION,THERAP/PROPH/DIAG2ST, IM OR SUBCUT: ICD-10-PCS | Mod: S$GLB,,, | Performed by: NURSE PRACTITIONER

## 2019-04-02 PROCEDURE — 99204 PR OFFICE/OUTPT VISIT, NEW, LEVL IV, 45-59 MIN: ICD-10-PCS | Mod: 25,S$GLB,, | Performed by: NURSE PRACTITIONER

## 2019-04-02 PROCEDURE — 99204 OFFICE O/P NEW MOD 45 MIN: CPT | Mod: 25,S$GLB,, | Performed by: NURSE PRACTITIONER

## 2019-04-02 PROCEDURE — 96372 THER/PROPH/DIAG INJ SC/IM: CPT | Mod: S$GLB,,, | Performed by: NURSE PRACTITIONER

## 2019-04-02 RX ORDER — BENZONATATE 100 MG/1
100 CAPSULE ORAL EVERY 6 HOURS PRN
Qty: 40 CAPSULE | Refills: 0 | Status: SHIPPED | OUTPATIENT
Start: 2019-04-02 | End: 2019-04-12

## 2019-04-02 RX ORDER — DEXAMETHASONE SODIUM PHOSPHATE 4 MG/ML
8 INJECTION, SOLUTION INTRA-ARTICULAR; INTRALESIONAL; INTRAMUSCULAR; INTRAVENOUS; SOFT TISSUE
Status: COMPLETED | OUTPATIENT
Start: 2019-04-02 | End: 2019-04-02

## 2019-04-02 RX ORDER — FLUTICASONE PROPIONATE 50 MCG
2 SPRAY, SUSPENSION (ML) NASAL DAILY
Qty: 15.8 ML | Refills: 0 | Status: SHIPPED | OUTPATIENT
Start: 2019-04-02 | End: 2019-08-01

## 2019-04-02 RX ORDER — AZELASTINE 1 MG/ML
1 SPRAY, METERED NASAL 2 TIMES DAILY
Qty: 30 ML | Refills: 0 | Status: SHIPPED | OUTPATIENT
Start: 2019-04-02 | End: 2019-08-01

## 2019-04-02 RX ORDER — AZITHROMYCIN 250 MG/1
TABLET, FILM COATED ORAL
Qty: 6 TABLET | Refills: 0 | Status: SHIPPED | OUTPATIENT
Start: 2019-04-02 | End: 2019-08-01

## 2019-04-02 RX ORDER — PROMETHAZINE HYDROCHLORIDE AND DEXTROMETHORPHAN HYDROBROMIDE 6.25; 15 MG/5ML; MG/5ML
5 SYRUP ORAL NIGHTLY PRN
Qty: 118 ML | Refills: 0 | Status: SHIPPED | OUTPATIENT
Start: 2019-04-02 | End: 2019-04-12

## 2019-04-02 RX ORDER — OMEPRAZOLE 10 MG/1
10 CAPSULE, DELAYED RELEASE ORAL DAILY
COMMUNITY
End: 2019-08-01

## 2019-04-02 RX ORDER — CETIRIZINE HYDROCHLORIDE 10 MG/1
10 TABLET ORAL DAILY
Qty: 30 TABLET | Refills: 0 | Status: SHIPPED | OUTPATIENT
Start: 2019-04-02 | End: 2019-05-02

## 2019-04-02 RX ADMIN — DEXAMETHASONE SODIUM PHOSPHATE 8 MG: 4 INJECTION, SOLUTION INTRA-ARTICULAR; INTRALESIONAL; INTRAMUSCULAR; INTRAVENOUS; SOFT TISSUE at 06:04

## 2019-04-02 NOTE — PROGRESS NOTES
"Subjective:       Patient ID: Leandra Lopes is a 53 y.o. female.    Vitals:  height is 5' 7.5" (1.715 m) and weight is 127.5 kg (281 lb). Her oral temperature is 97.1 °F (36.2 °C). Her blood pressure is 124/77 and her pulse is 75. Her respiration is 20 and oxygen saturation is 99%.     Chief Complaint: Sinus Problem    Patient complains of sinus congestion/pressure that began 4 days ago, states "it is getting worse everyday." Also reports headache she can't get rid of, productive cough, sneezing, sore throat, watery eyes. Denies fever, sob, chest pain, nausea, vomiting, diarrhea.     Sinus Problem   This is a new problem. The current episode started in the past 7 days. Associated symptoms include ear pain, headaches, sinus pressure, sneezing and a sore throat. Pertinent negatives include no chills, congestion, coughing or shortness of breath. (Ears stopped up) Past treatments include acetaminophen.       Constitution: Negative for chills, fatigue and fever.   HENT: Positive for ear pain, postnasal drip, sinus pain, sinus pressure and sore throat. Negative for congestion.    Neck: Negative for painful lymph nodes.   Cardiovascular: Negative for chest pain and leg swelling.   Eyes: Negative for double vision and blurred vision.   Respiratory: Negative for cough and shortness of breath.    Gastrointestinal: Negative for abdominal pain, nausea, vomiting and diarrhea.   Endocrine: negative.   Genitourinary: Negative for dysuria, frequency, urgency and history of kidney stones.   Musculoskeletal: Negative for joint pain, joint swelling, muscle cramps and muscle ache.   Skin: Negative for color change, pale, rash and bruising.   Allergic/Immunologic: Positive for sneezing. Negative for seasonal allergies.   Neurological: Positive for headaches. Negative for dizziness, history of vertigo, light-headedness and passing out.   Hematologic/Lymphatic: Negative for swollen lymph nodes.   Psychiatric/Behavioral: Negative for " nervous/anxious, sleep disturbance and depression. The patient is not nervous/anxious.        Objective:      Physical Exam   Constitutional: She is oriented to person, place, and time. Vital signs are normal. She appears well-developed and well-nourished. She is cooperative.  Non-toxic appearance. She does not have a sickly appearance. She does not appear ill. No distress.   HENT:   Head: Normocephalic and atraumatic.   Right Ear: Hearing, tympanic membrane, external ear and ear canal normal.   Left Ear: Hearing, external ear and ear canal normal. A middle ear effusion is present.   Nose: Mucosal edema present. No rhinorrhea or nasal deformity. No epistaxis. Right sinus exhibits maxillary sinus tenderness and frontal sinus tenderness. Left sinus exhibits maxillary sinus tenderness and frontal sinus tenderness.   Mouth/Throat: Uvula is midline and mucous membranes are normal. No trismus in the jaw. Normal dentition. No uvula swelling. Posterior oropharyngeal erythema present. No oropharyngeal exudate or posterior oropharyngeal edema.   Eyes: Conjunctivae and lids are normal. Right eye exhibits no discharge. Left eye exhibits no discharge. No scleral icterus.   Sclera clear bilat   Neck: Trachea normal, normal range of motion, full passive range of motion without pain and phonation normal. Neck supple.   Cardiovascular: Normal rate, regular rhythm, normal heart sounds, intact distal pulses and normal pulses.   Pulmonary/Chest: Effort normal and breath sounds normal. No respiratory distress.   Abdominal: Soft. Normal appearance and bowel sounds are normal. She exhibits no distension, no pulsatile midline mass and no mass. There is no tenderness.   Musculoskeletal: Normal range of motion. She exhibits no edema or deformity.   Neurological: She is alert and oriented to person, place, and time. She exhibits normal muscle tone. Coordination normal. GCS eye subscore is 4. GCS verbal subscore is 5. GCS motor subscore is 6.    Skin: Skin is warm, dry and intact. No rash noted. She is not diaphoretic. No pallor.   Psychiatric: She has a normal mood and affect. Her speech is normal and behavior is normal. Judgment and thought content normal. Cognition and memory are normal.   Nursing note and vitals reviewed.      Assessment:       1. Acute non-recurrent sinusitis, unspecified location        Plan:       Due to patient presentation, will treat for bacterial sinusitis. Patient is being discharged home. Discussed reasons to return and importance of followup. All questions addressed and patient given discharge instructions and followup information.    Acute non-recurrent sinusitis, unspecified location    Other orders  -     dexamethasone injection 8 mg  -     cetirizine (ZYRTEC) 10 MG tablet; Take 1 tablet (10 mg total) by mouth once daily.  Dispense: 30 tablet; Refill: 0  -     fluticasone (FLONASE) 50 mcg/actuation nasal spray; 2 sprays (100 mcg total) by Each Nare route once daily.  Dispense: 15.8 mL; Refill: 0  -     promethazine-dextromethorphan (PROMETHAZINE-DM) 6.25-15 mg/5 mL Syrp; Take 5 mLs by mouth nightly as needed.  Dispense: 118 mL; Refill: 0  -     azelastine (ASTELIN) 137 mcg (0.1 %) nasal spray; 1 spray (137 mcg total) by Nasal route 2 (two) times daily.  Dispense: 30 mL; Refill: 0  -     azithromycin (ZITHROMAX) 250 MG tablet; Take 2 tablets (500 mg) on  Day 1,  followed by 1 tablet (250 mg) once daily on Days 2 through 5.  Dispense: 6 tablet; Refill: 0

## 2019-04-02 NOTE — PATIENT INSTRUCTIONS
Acute Sinusitis    Acute sinusitis is irritation and swelling of the sinuses. It is usually caused by a viral infection after a common cold. Your doctor can help you find relief.  What is acute sinusitis?  Sinuses are air-filled spaces in the skull behind the face. They are kept moist and clean by a lining of mucosa. Things such as pollen, smoke, and chemical fumes can irritate the mucosa. It can then swell up. As a response to irritation, the mucosa makes more mucus and other fluids. Tiny hairlike cilia cover the mucosa. Cilia help carry mucus toward the opening of the sinus. Too much mucus may cause the cilia to stop working. This blocks the sinus opening. A buildup of fluid in the sinuses then causes pain and pressure. It can also encourage bacteria to grow in the sinuses.  Common symptoms of acute sinusitis  You may have:  · Facial soreness pain  · Headache  · Fever  · Fluid draining in the back of the throat (postnasal drip)  · Congestion  · Drainage that is thick and colored, instead of clear  · Cough  Diagnosing acute sinusitis  Your doctor will ask about your symptoms and health history. He or she will look at your ear, nose, and throat. You usually won't need to have X-rays taken.    The doctor may take a sample of mucus to check for bacteria. If you have sinusitis that keeps coming back, you may need imaging tests such as X-rays or CAT scans. This will help your doctor check for a structural problem that may be causing the infection.  Treating acute sinusitis  Treatment is aimed at unblocking the sinus opening and helping the cilia work again. You may need to take antihistamine and decongestant medicine. These can reduce inflammation and decrease the amount of fluid your sinuses make. If you have a bacterial infection, you will need to take antibiotic medicine for 10 to 14 days. Take this medicine until it is gone, even if you feel better.  Date Last Reviewed: 10/1/2016  © 7910-1490 [employer],  [employer]. [address] [place] [address]. All rights reserved. This information is not intended as a substitute for professional medical care. Always follow your healthcare professional's instructions.        Self-Care for Sinusitis     Drinking plenty of water can help sinuses drain.   Sinusitis can often be managed with self-care. Self-care can keep sinuses moist and make you feel more comfortable. Remember to follow your doctor's instructions closely. This can make a big difference in getting your sinus problem under control.  Drink fluids  Drinking extra fluids helps thin your mucus. This lets it drain from your sinuses more easily. Have a glass of water every hour or two. A humidifier helps in much the same way. Fluids can also offset the drying effects of certain medicines. If you use a humidifier, follow the product maker's instructions on how to use it. Clean it on a regular schedule.  Use saltwater rinses  Rinses help keep your sinuses and nose moist. Mix a teaspoon of salt in 8 ounces of fresh, warm water. Use a bulb syringe to gently squirt the water into your nose a few times a day. You can also buy ready-made saline nasal sprays.  Apply hot or cold packs  Applying heat to the area surrounding your sinuses may make you feel more comfortable. Use a hot water bottle or a hand towel dipped in hot water. Some people also find ice packs effective for relieving pain.  Medicines  Your doctor may prescribe medications to help treat your sinusitis. If you have an infection, antibiotics can help clear it up. If you are prescribed antibiotics, take all pills on schedule until they are gone, even if you feel better. Decongestants help relieve swelling. Use decongestant sprays for short periods only under the direction of your doctor. If you have allergies, your doctor may prescribe medications to help relieve them.   Date Last Reviewed: 10/1/2016  © 2091-5137 [employer]. [address] [place] [address],  [place] [address]. All rights reserved. This information is not intended as a substitute for professional medical care. Always follow your healthcare professional's instructions.        Earache, No Infection (Adult)  Earaches can happen without an infection. This occurs when air and fluid build up behind the eardrum causing a feeling of fullness and discomfort and reduced hearing. This is called otitis media with effusion (OME) or serous otitis media. It means there is fluid in the middle ear. It is not the same as acute otitis media, which is typically from infection.  OME can happen when you have a cold if congestion blocks the passage that drains the middle ear. This passage is called the eustachian tube. OME may also occur with nasal allergies or after a bacterial middle ear infection.    The pain or discomfort may come and go. You may hear clicking or popping sounds when you chew or swallow. You may feel that your balance is off. Or you may hear ringing in the ear.  It often takes from several weeks up to 3 months for the fluid to clear on its own. Oral pain relievers and ear drops help if there is pain. Decongestants and antihistamines sometimes help. Antibiotics don't help since there is no infection. Your doctor may prescribe a nasal spray to help reduce swelling in the nose and eustachian tube. This can allow the ear to drain.  If your OME doesn't improve after 3 months, surgery may be used to drain the fluid and insert a small tube in the eardrum to allow continued drainage.  Because the middle ear fluid can become infected, it is important to watch for signs of an ear infection which may develop later. These signs include increased ear pain, fever, or drainage from the ear.  Home care  The following guidelines will help you care for yourself at home:  · You may use over-the-counter medicine as directed to control pain, unless another medicine was prescribed. If you have chronic liver or kidney disease or  ever had a stomach ulcer or GI bleeding, talk with your doctor before using these medicines. Aspirin should never be used in anyone under 18 years of age who is ill with a fever. It may cause severe liver damage.  · You may use over-the-counter decongestants such as phenylephrine or pseudoephedrine. But they are not always helpful. Don't use nasal spray decongestants more than 3 days. Longer use can make congestion worse. Prescription nasal sprays from your doctor don't typically have those restrictions.  · Antihistamines may help if you are also having allergy symptoms.  · You may use medicines such as guaifenesin to thin mucus and promote drainage.  Follow-up care  Follow up with your healthcare provider or as advised if you are not feeling better after 3 days.  When to seek medical advice  Call your healthcare provider right away if any of the following occur:  · Your ear pain gets worse or does not start to improve   · Fever of 100.4°F (38°C) or higher, or as directed by your healthcare provider  · Fluid or blood draining from the ear  · Headache or sinus pain  · Stiff neck  · Unusual drowsiness or confusion  Date Last Reviewed: 10/1/2016  © 1761-9872 [employer]. [address] [place] [address], [place] [address]. All rights reserved. This information is not intended as a substitute for professional medical care. Always follow your healthcare professional's instructions.

## 2019-04-25 RX ORDER — MELOXICAM 15 MG/1
TABLET ORAL
Qty: 30 TABLET | Refills: 6 | Status: SHIPPED | OUTPATIENT
Start: 2019-04-25 | End: 2019-11-08

## 2019-06-06 ENCOUNTER — PATIENT OUTREACH (OUTPATIENT)
Dept: ADMINISTRATIVE | Facility: HOSPITAL | Age: 54
End: 2019-06-06

## 2019-06-26 ENCOUNTER — TELEPHONE (OUTPATIENT)
Dept: FAMILY MEDICINE | Facility: CLINIC | Age: 54
End: 2019-06-26

## 2019-06-26 NOTE — TELEPHONE ENCOUNTER
Pt is wanting to know if her handi cap tag for [place] can be renewed for another year. Pt states she has back issues and it has been  since end of March. Or does pt need to wait and discuss this at her appt in August?

## 2019-06-26 NOTE — TELEPHONE ENCOUNTER
----- Message from Jonatan Tan sent at 6/26/2019 10:53 AM CDT -----  Contact: self   Patient need to speak with a nurse regarding having her handicap tag renewed for another year, please call back at [phone] (home)

## 2019-07-18 ENCOUNTER — PATIENT OUTREACH (OUTPATIENT)
Dept: ADMINISTRATIVE | Facility: HOSPITAL | Age: 54
End: 2019-07-18

## 2019-08-01 ENCOUNTER — OFFICE VISIT (OUTPATIENT)
Dept: FAMILY MEDICINE | Facility: CLINIC | Age: 54
End: 2019-08-01
Payer: COMMERCIAL

## 2019-08-01 ENCOUNTER — HOSPITAL ENCOUNTER (OUTPATIENT)
Dept: RADIOLOGY | Facility: CLINIC | Age: 54
Discharge: HOME | End: 2019-08-01
Attending: FAMILY MEDICINE
Payer: COMMERCIAL

## 2019-08-01 VITALS
SYSTOLIC BLOOD PRESSURE: 112 MMHG | WEIGHT: 283.31 LBS | OXYGEN SATURATION: 93 % | HEIGHT: 68 IN | TEMPERATURE: 99 F | BODY MASS INDEX: 42.94 KG/M2 | HEART RATE: 73 BPM | DIASTOLIC BLOOD PRESSURE: 72 MMHG

## 2019-08-01 DIAGNOSIS — Z13.6 ENCOUNTER FOR LIPID SCREENING FOR CARDIOVASCULAR DISEASE: ICD-10-CM

## 2019-08-01 DIAGNOSIS — G89.29 CHRONIC MIDLINE LOW BACK PAIN WITH LEFT-SIDED SCIATICA: ICD-10-CM

## 2019-08-01 DIAGNOSIS — Z00.00 WELLNESS EXAMINATION: Primary | ICD-10-CM

## 2019-08-01 DIAGNOSIS — M54.42 CHRONIC MIDLINE LOW BACK PAIN WITH LEFT-SIDED SCIATICA: ICD-10-CM

## 2019-08-01 DIAGNOSIS — Z23 IMMUNIZATION DUE: ICD-10-CM

## 2019-08-01 DIAGNOSIS — Z13.220 ENCOUNTER FOR LIPID SCREENING FOR CARDIOVASCULAR DISEASE: ICD-10-CM

## 2019-08-01 PROCEDURE — 90471 ZOSTER RECOMBINANT VACCINE: ICD-10-PCS | Mod: S$GLB,,, | Performed by: FAMILY MEDICINE

## 2019-08-01 PROCEDURE — 99999 PR PBB SHADOW E&M-EST. PATIENT-LVL IV: CPT | Mod: PBBFAC,,, | Performed by: FAMILY MEDICINE

## 2019-08-01 PROCEDURE — 90750 ZOSTER RECOMBINANT VACCINE: ICD-10-PCS | Mod: S$GLB,,, | Performed by: FAMILY MEDICINE

## 2019-08-01 PROCEDURE — 90750 HZV VACC RECOMBINANT IM: CPT | Mod: S$GLB,,, | Performed by: FAMILY MEDICINE

## 2019-08-01 PROCEDURE — 99396 PREV VISIT EST AGE 40-64: CPT | Mod: 25,S$GLB,, | Performed by: FAMILY MEDICINE

## 2019-08-01 PROCEDURE — 99396 PR PREVENTIVE VISIT,EST,40-64: ICD-10-PCS | Mod: 25,S$GLB,, | Performed by: FAMILY MEDICINE

## 2019-08-01 PROCEDURE — 72110 X-RAY EXAM L-2 SPINE 4/>VWS: CPT | Mod: 26,,, | Performed by: RADIOLOGY

## 2019-08-01 PROCEDURE — 90471 IMMUNIZATION ADMIN: CPT | Mod: S$GLB,,, | Performed by: FAMILY MEDICINE

## 2019-08-01 PROCEDURE — 72110 XR LUMBAR SPINE COMPLETE 5 VIEW: ICD-10-PCS | Mod: 26,,, | Performed by: RADIOLOGY

## 2019-08-01 PROCEDURE — 72110 X-RAY EXAM L-2 SPINE 4/>VWS: CPT | Mod: TC,FY,PO

## 2019-08-01 PROCEDURE — 99999 PR PBB SHADOW E&M-EST. PATIENT-LVL IV: ICD-10-PCS | Mod: PBBFAC,,, | Performed by: FAMILY MEDICINE

## 2019-08-01 RX ORDER — SILVER SULFADIAZINE 10 G/1000G
CREAM TOPICAL
Status: ON HOLD | COMMUNITY
End: 2019-11-26

## 2019-08-01 RX ORDER — ANASTROZOLE 1 MG/1
1 TABLET ORAL
COMMUNITY
Start: 2017-01-24 | End: 2019-08-01 | Stop reason: ALTCHOICE

## 2019-08-01 RX ORDER — DOCUSATE SODIUM 100 MG/1
CAPSULE, LIQUID FILLED ORAL
COMMUNITY
End: 2020-01-13

## 2019-08-01 RX ORDER — DICLOFENAC SODIUM 10 MG/G
2 GEL TOPICAL 4 TIMES DAILY
Qty: 100 G | Refills: 3 | Status: ON HOLD | OUTPATIENT
Start: 2019-08-01 | End: 2019-11-26

## 2019-08-01 RX ORDER — SULFAMETHOXAZOLE AND TRIMETHOPRIM 800; 160 MG/1; MG/1
TABLET ORAL
COMMUNITY
End: 2019-08-01 | Stop reason: ALTCHOICE

## 2019-08-01 RX ORDER — CLINDAMYCIN HYDROCHLORIDE 300 MG/1
CAPSULE ORAL
COMMUNITY
End: 2019-08-01 | Stop reason: ALTCHOICE

## 2019-08-01 RX ORDER — ONDANSETRON 8 MG/1
TABLET, ORALLY DISINTEGRATING ORAL
Status: ON HOLD | COMMUNITY
End: 2019-11-26

## 2019-08-01 RX ORDER — FERROUS SULFATE 325(65) MG
TABLET ORAL
COMMUNITY
End: 2019-08-01 | Stop reason: ALTCHOICE

## 2019-08-01 RX ORDER — DICYCLOMINE HYDROCHLORIDE 10 MG/1
CAPSULE ORAL
COMMUNITY
End: 2019-08-01 | Stop reason: ALTCHOICE

## 2019-08-01 RX ORDER — OXYCODONE AND ACETAMINOPHEN 5; 325 MG/1; MG/1
TABLET ORAL
COMMUNITY
End: 2019-08-01

## 2019-08-01 RX ORDER — ZOLPIDEM TARTRATE 5 MG/1
TABLET ORAL
COMMUNITY
End: 2019-08-01 | Stop reason: ALTCHOICE

## 2019-08-01 RX ORDER — DIAZEPAM 5 MG/1
TABLET ORAL
COMMUNITY
End: 2019-08-01 | Stop reason: ALTCHOICE

## 2019-08-01 RX ORDER — HYDROCODONE BITARTRATE AND ACETAMINOPHEN 10; 325 MG/1; MG/1
TABLET ORAL
COMMUNITY
End: 2019-08-01

## 2019-08-01 RX ORDER — ESOMEPRAZOLE MAGNESIUM 40 MG/1
CAPSULE, DELAYED RELEASE ORAL
COMMUNITY
End: 2019-08-01 | Stop reason: ALTCHOICE

## 2019-08-01 NOTE — PATIENT INSTRUCTIONS
4 Steps for Eating Healthier  Changing the way you eat can improve your health. It can lower your cholesterol and blood pressure, and help you stay at a healthy weight. Your diet doesnt have to be bland and boring to be healthy. Just watch your calories and follow these steps:    1. Eat fewer unhealthy fats  · Choose more fish and lean meats instead of fatty cuts of meat.  · Skip butter and lard, and use less margarine.  · Pass on foods that have palm, coconut, or hydrogenated oils.  · Eat fewer high-fat dairy foods like cheese, ice cream, and whole milk.  · Get a heart-healthy cookbook and try some low-fat recipes.  2. Go light on salt  · Keep the saltshaker off the table.  · Limit high-salt ingredients, such as soy sauce, bouillon, and garlic salt.  · Instead of adding salt when cooking, season your food with herbs and flavorings. Try lemon, garlic, and onion.  · Limit convenience foods, such as boxed or canned foods and restaurant food.  · Read food labels and choose lower-sodium options.  3. Limit sugar  · Pause before you add sugars to pancakes, cereal, coffee, or tea. This includes white and brown table sugar, syrup, honey, and molasses. Cut your usual amount by half.  · Use non-sugar sweeteners. Stevia, aspartame, and sucralose can satisfy a sweet tooth without adding calories.  · Swap out sugar-filled soda and other drinks. Buy sugar-free or low-calorie beverages. Remember water is always the best choice.  · Read labels and choose foods with less added sugar. Keep in mind that dairy foods and foods with fruit will have some natural sugar.  · Cut the sugar in recipes by 1/3 to 1/2. Boost the flavor with extracts like almond, vanilla, or orange. Or add spices such as cinnamon or nutmeg.  4. Eat more fiber  · Eat fresh fruits and vegetables every day.  · Boost your diet with whole grains. Go for oats, whole-grain rice, and bran.  · Add beans and lentils to your meals.  · Drink more water to match your fiber  increase. This is to help prevent constipation.  Date Last Reviewed: 5/11/2015  © 4179-5932 [employer]. [address] [place] [address], [place] [address]. All rights reserved. This information is not intended as a substitute for professional medical care. Always follow your healthcare professional's instructions.        Exercise for a Healthier Heart  You may wonder how you can improve the health of your heart. If youre thinking about exercise, youre on the right track. You dont need to become an athlete, but you do need a certain amount of brisk exercise to help strengthen your heart. If you have been diagnosed with a heart condition, your doctor may recommend exercise to help stabilize your condition. To help make exercise a habit, choose safe, fun activities.     Exercise with a friend. When activity is fun, you're more likely to stick with it.     Be sure to check with your healthcare provider before starting an exercise program.   Why exercise?  Exercising regularly offers many healthy rewards. It can help you do all of the following:  · Improve your blood cholesterol level to help prevent further heart trouble  · Lower your blood pressure to help prevent a stroke or heart attack  · Control diabetes, or reduce your risk of getting this disease  · Improve your heart and lung function  · Reach and maintain a healthy weight  · Make your muscles stronger and more limber so you can stay active  · Prevent falls and fractures by slowing the loss of bone mass (osteoporosis)  · Manage stress better  · Reduce your blood pressure  · Improve your sense of self and your body image  Exercise tips  Ease into your routine. Set small goals. Then build on them.  Exercise on most days. Aim for a total of 150 or more minutes of moderate to  vigorous intensity activity each week. Consider 40 minutes, 3 to 4 times a week. For best results, activity should last for 40 minutes on average. It is OK to work up to the 40 minute  period over time. Examples of moderate-intensity activity is walking 1 mile in 15 minutes or 30 to 45 minutes of yard work.  Step up your daily activity level. Along with your exercise program, try being more active throughout the day. Walk instead of drive. Do more household tasks or yard work.  Choose one or more activities you enjoy. Walking is one of the easiest things you can do. You can also try swimming, riding a bike, dancing, or taking an exercise class.  Stop exercising and call your doctor if you:  · Have chest pain or feel dizzy or lightheaded  · Feel burning, tightness, pressure, or heaviness in your chest, neck, shoulders, back, or arms  · Have unusual shortness of breath  · Have increased joint or muscle pain  · Have palpitations or an irregular heartbeat   Date Last Reviewed: 5/1/2016  © 9711-6804 [email or web address]. [address] [place] [address], [place] [address]. All rights reserved. This information is not intended as a substitute for professional medical care. Always follow your healthcare professional's instructions.

## 2019-08-01 NOTE — PROGRESS NOTES
2 identifiers, name and , used to confirm patient identity.  Procedure was explained and patient verbalized understanding. Shingrix was given IM in the right deltoid using sterile technique. Patient tolerated procedure well. No residual bleeding noted at the injection site.

## 2019-08-08 DIAGNOSIS — M54.50 LUMBAR BACK PAIN: Primary | ICD-10-CM

## 2019-08-13 ENCOUNTER — TELEPHONE (OUTPATIENT)
Dept: FAMILY MEDICINE | Facility: CLINIC | Age: 54
End: 2019-08-13

## 2019-08-13 NOTE — TELEPHONE ENCOUNTER
Spoke to patient. Notified this nurse faxed referral and xray results to [facility] patient states understanding.    Faxed via epic

## 2019-08-13 NOTE — TELEPHONE ENCOUNTER
----- Message from Roberto Carlos Prieto sent at 8/13/2019  4:51 PM CDT -----  Type: Needs Medical Advice    Who Called:  Patient    Best Call Back Number: [phone]   Additional Information: Patient states that [facility] has not received the patient's orders yet.  Please call to advise

## 2019-10-14 ENCOUNTER — TELEPHONE (OUTPATIENT)
Dept: FAMILY MEDICINE | Facility: CLINIC | Age: 54
End: 2019-10-14

## 2019-10-14 ENCOUNTER — OFFICE VISIT (OUTPATIENT)
Dept: ORTHOPEDICS | Facility: CLINIC | Age: 54
End: 2019-10-14
Payer: COMMERCIAL

## 2019-10-14 VITALS
HEIGHT: 68 IN | HEART RATE: 62 BPM | SYSTOLIC BLOOD PRESSURE: 126 MMHG | WEIGHT: 270 LBS | BODY MASS INDEX: 40.92 KG/M2 | DIASTOLIC BLOOD PRESSURE: 82 MMHG

## 2019-10-14 DIAGNOSIS — M41.9 SCOLIOSIS OF LUMBOSACRAL SPINE, UNSPECIFIED SCOLIOSIS TYPE: ICD-10-CM

## 2019-10-14 DIAGNOSIS — M51.36 DISC DEGENERATION, LUMBAR: ICD-10-CM

## 2019-10-14 DIAGNOSIS — M54.16 LUMBAR RADICULOPATHY: Primary | ICD-10-CM

## 2019-10-14 PROCEDURE — 99203 PR OFFICE/OUTPT VISIT, NEW, LEVL III, 30-44 MIN: ICD-10-PCS | Mod: 25,S$GLB,, | Performed by: ORTHOPAEDIC SURGERY

## 2019-10-14 PROCEDURE — 3008F PR BODY MASS INDEX (BMI) DOCUMENTED: ICD-10-PCS | Mod: S$GLB,,, | Performed by: ORTHOPAEDIC SURGERY

## 2019-10-14 PROCEDURE — 3008F BODY MASS INDEX DOCD: CPT | Mod: S$GLB,,, | Performed by: ORTHOPAEDIC SURGERY

## 2019-10-14 PROCEDURE — 99203 OFFICE O/P NEW LOW 30 MIN: CPT | Mod: 25,S$GLB,, | Performed by: ORTHOPAEDIC SURGERY

## 2019-10-14 NOTE — TELEPHONE ENCOUNTER
----- Message from Dulce Teague sent at 10/14/2019 11:06 AM CDT -----  Contact: pt  Type: Needs Medical Advice    Who Called:  pt  Best Call Back Number:   Additional Information: Requesting a call from Nurse regarding appt access for shingles shot,please call with advice

## 2019-10-14 NOTE — PROGRESS NOTES
Subjective:       Patient ID: Leandra Lopes is a 54 y.o. female.    Chief Complaint: Pain of the Lumbar Spine (Lumbar pain for years but worse recently. Intermittent radiating leg pain on right side. Pain goes across lower back and up into mid back. She has a lump in muscles in back. She is doing PT 1x week, no relief. Pain with standing and walking extended time)      History of Present Illness  History of low back pain over several years radiating down right leg symptoms have progressively worsened over the last 6 months she's had 3 bouts of physical therapy which have not resolved her symptoms she is also taken anti-inflammatory medicines and topical anti-inflammatories which only afforded her partial temporary relief    Current Medications  Current Outpatient Medications   Medication Sig Dispense Refill    diclofenac sodium (VOLTAREN) 1 % Gel Apply 2 g topically 4 (four) times daily. 100 g 3    glucosam-chond-hyalu-CF borate (MoPals) 750 mg-100 mg- 1.65 mg-108 mg Tab Take 1 tablet by mouth once daily.      meloxicam (MOBIC) 15 MG tablet TAKE 1 TABLET BY MOUTH EVERY DAY 30 tablet 6    multivitamin capsule Take 1 capsule by mouth once daily.      wheat dextrin (BENEFIBER CLEAR SF, DEXTRIN,) 3 gram/3.5 gram PwPk Take by mouth.      cetirizine (ZYRTEC) 10 MG tablet Take 1 tablet (10 mg total) by mouth once daily. 30 tablet 0    docusate sodium (COLACE) 100 MG capsule docusate sodium 100 mg capsule   TAKE 1 CAPSULE EVERY 12 HOURS      ondansetron (ZOFRAN-ODT) 8 MG TbDL ondansetron 8 mg disintegrating tablet   DISSOLVE 1 TABLET UNDER TONGUE EVERY 8 HOURS AS NEEDED FOR NAUSEA      silver sulfADIAZINE 1% (SILVADENE) 1 % cream silver sulfadiazine 1 % topical cream   APPLY TO AFFECTED AREA TWICE A DAY       No current facility-administered medications for this visit.        Allergies  Review of patient's allergies indicates:   Allergen Reactions    Morphine Shortness Of Breath    Adhesive Rash      AGENT: Patient thinks it was PAPER TAPE    Aspirin Other (See Comments)     Stomach pain    Penicillin g Other (See Comments)     Stomach pain    Morphine sulfate        Past Medical History  Past Medical History:   Diagnosis Date    Breast CA     Breast cancer 2013    left breast infiltrating ductal carcinoma, ER/MD positive, Her2 negative    SADIE (obstructive sleep apnea)     on cpap       Surgical History  Past Surgical History:   Procedure Laterality Date    BREAST BIOPSY  2013    left breast- IDC    BREAST SURGERY  10//    bilateral mastectomy and reconstruction-no implants     SECTION      x3    CHOLECYSTECTOMY      COLON SURGERY  2009    benign neoplasm recheck 2014, 12 inches    HYSTERECTOMY      TVH/BSO, benign    TONSILLECTOMY      TUBAL LIGATION         Family History:   Family History   Problem Relation Age of Onset    Cancer Mother         liver, bone, breast    Hypertension Mother     Heart disease Mother     Breast cancer Mother 63        initial dx ILC at 63 with recurrence at 67, 2nd CA IDC at 67    Cancer Father 62        bone ? breast    Diabetes Father     Breast cancer Maternal Aunt 50    Ovarian cancer Cousin 49        daughter of M Aunt with Breast CA       Social History:   Social History     Socioeconomic History    Marital status:      Spouse name: Not on file    Number of children: Not on file    Years of education: Not on file    Highest education level: Not on file   Occupational History    Not on file   Social Needs    Financial resource strain: Not hard at all    Food insecurity:     Worry: Never true     Inability: Never true    Transportation needs:     Medical: No     Non-medical: No   Tobacco Use    Smoking status: Never Smoker    Smokeless tobacco: Never Used   Substance and Sexual Activity    Alcohol use: Yes     Frequency: Monthly or less     Drinks per session: 1 or 2     Binge frequency: Never     Comment: rarely     Drug use: No    Sexual activity: Yes     Partners: Male     Birth control/protection: Surgical   Lifestyle    Physical activity:     Days per week: 2 days     Minutes per session: 10 min    Stress: To some extent   Relationships    Social connections:     Talks on phone: Three times a week     Gets together: Three times a week     Attends Christian service: Not on file     Active member of club or organization: No     Attends meetings of clubs or organizations: Never     Relationship status:    Other Topics Concern    Not on file   Social History Narrative    Not on file       Hospitalization/Major Diagnostic Procedure:     Review of Systems     General/Constitutional:  Chills denies. Fatigue denies. Fever denies. Weight gain denies. Weight loss denies.    Respiratory:  Shortness of breath denies.    Cardiovascular:  Chest pain denies.    Gastrointestinal:  Constipation denies. Diarrhea denies. Nausea denies. Vomiting denies.     Hematology:  Easy bruising denies. Prolonged bleeding denies.     Genitourinary:  Frequent urination denies. Pain in lower back denies. Painful urination denies.     Musculoskeletal:  See HPI for details    Skin:  Rash denies.    Neurologic:  Dizziness denies. Gait abnormalities denies. Seizures denies. Tingling/Numbess denies.    Psychiatric:  Anxiety denies. Depressed mood denies.     Objective:   Vital Signs:   Vitals:    10/14/19 1002   BP: 126/82   Pulse: 62        Physical Exam      General Examination:     Constitutional: The patient is alert and oriented to lace person and time. Mood is pleasant.     Head/Face: Normal facial features normal eyebrows    Eyes: Normal extraocular motion bilaterally    Lungs: Respirations are equal and unlabored    Gait is coordinated.    Cardiovascular: There are no swelling or varicosities present.    Lymphatic: Negative for adenopathy    Skin: Normal    Neurological: Level of consciousness normal. Oriented to place person and time  and situation    Psychiatric: Oriented to time place person and situation    Patient can stand erect has pain when doing so bilateral paraspinous muscle spasm L4-S1. Mild scoliosis. Marked restriction of motion. Straight leg raising positive on the right motor exam normal reflexes symmetrical hip and knee range of motion intact    XRAY Report/ Interpretation : AP pelvis x-ray was taken today. Indications low back pain and/or hip pain. Findings AP pelvis x-ray appears to be normal with no evidence of fractures or significant degenerative disease  AP and lateral x-rays of lumbar spine will performed today. Indications low back pain. Findings: Mild scoliosis and multilevel disc degeneration noted with some facet joint arthritis or x-rays previously performed      Assessment:       1. Lumbar radiculopathy    2. Disc degeneration, lumbar    3. Scoliosis of lumbosacral spine, unspecified scoliosis type        Plan:       Leandra was seen today for pain.    Diagnoses and all orders for this visit:    Lumbar radiculopathy  -     Cancel: X-Ray Lumbar Spine Ap And Lateral  -     X-Ray Pelvis Routine AP    Disc degeneration, lumbar    Scoliosis of lumbosacral spine, unspecified scoliosis type         No follow-ups on file.    Due to the duration of her symptoms and failure of anti-inflammatory medicines rest modifications of activity as well as 3 episodes of physical therapy I would advise an MRI study of the lumbar spine at this time    This note was created using Dragon voice recognition software that occasionally misinterpreted phrases or words.

## 2019-10-16 ENCOUNTER — CLINICAL SUPPORT (OUTPATIENT)
Dept: FAMILY MEDICINE | Facility: CLINIC | Age: 54
End: 2019-10-16
Payer: COMMERCIAL

## 2019-10-16 NOTE — PROGRESS NOTES
Pt was here for 2nd shingles vaccine. Vaccine is currently out. Pt states she will go to [facility] and see about getting the shot there.

## 2019-10-22 ENCOUNTER — TELEPHONE (OUTPATIENT)
Dept: ORTHOPEDICS | Facility: CLINIC | Age: 54
End: 2019-10-22

## 2019-10-22 NOTE — TELEPHONE ENCOUNTER
----- Message from Cinthia Cobb sent at 10/22/2019 11:59 AM CDT -----  Contact: patient  Dr guillermo pt-called she was in last week and seen dr guillermo and our office put in the orders for a mri but when she called [facility] to schedule she was told her the orders are still pending pts# [phone]

## 2019-10-24 ENCOUNTER — HOSPITAL ENCOUNTER (OUTPATIENT)
Dept: RADIOLOGY | Facility: HOSPITAL | Age: 54
Discharge: HOME | End: 2019-10-24
Attending: ORTHOPAEDIC SURGERY
Payer: COMMERCIAL

## 2019-10-24 DIAGNOSIS — M54.16 LUMBAR RADICULOPATHY: ICD-10-CM

## 2019-10-24 DIAGNOSIS — M51.36 DISC DEGENERATION, LUMBAR: ICD-10-CM

## 2019-10-24 PROCEDURE — 72148 MRI LUMBAR SPINE W/O DYE: CPT | Mod: TC,PO

## 2019-10-28 ENCOUNTER — OFFICE VISIT (OUTPATIENT)
Dept: ORTHOPEDICS | Facility: CLINIC | Age: 54
End: 2019-10-28
Payer: COMMERCIAL

## 2019-10-28 VITALS
HEART RATE: 80 BPM | BODY MASS INDEX: 41.66 KG/M2 | WEIGHT: 270 LBS | DIASTOLIC BLOOD PRESSURE: 86 MMHG | SYSTOLIC BLOOD PRESSURE: 134 MMHG

## 2019-10-28 DIAGNOSIS — M41.9 SCOLIOSIS OF LUMBOSACRAL SPINE, UNSPECIFIED SCOLIOSIS TYPE: ICD-10-CM

## 2019-10-28 DIAGNOSIS — M48.062 LUMBAR STENOSIS WITH NEUROGENIC CLAUDICATION: ICD-10-CM

## 2019-10-28 DIAGNOSIS — M47.816 LUMBAR FACET ARTHROPATHY: Primary | ICD-10-CM

## 2019-10-28 DIAGNOSIS — M51.36 DISC DEGENERATION, LUMBAR: ICD-10-CM

## 2019-10-28 PROCEDURE — 3008F PR BODY MASS INDEX (BMI) DOCUMENTED: ICD-10-PCS | Mod: S$GLB,,, | Performed by: ORTHOPAEDIC SURGERY

## 2019-10-28 PROCEDURE — 99213 OFFICE O/P EST LOW 20 MIN: CPT | Mod: S$GLB,,, | Performed by: ORTHOPAEDIC SURGERY

## 2019-10-28 PROCEDURE — 3008F BODY MASS INDEX DOCD: CPT | Mod: S$GLB,,, | Performed by: ORTHOPAEDIC SURGERY

## 2019-10-28 PROCEDURE — 99213 PR OFFICE/OUTPT VISIT, EST, LEVL III, 20-29 MIN: ICD-10-PCS | Mod: S$GLB,,, | Performed by: ORTHOPAEDIC SURGERY

## 2019-10-28 NOTE — PROGRESS NOTES
Subjective:       Patient ID: Leandra Lopes is a 54 y.o. female.    Chief Complaint: Pain of the Lumbar Spine (MRI results)      History of Present Illness     History of low back pain over several years that have progressively worsened over the last 6 months she's had 3 bouts of physical therapy which have not resolved her symptoms she is also taken anti-inflammatory medicines and topical anti-inflammatories which only afforded her partial temporary relief here to review MRI results she experiences some symptoms in her left foot sometimes on the plantar aspect and sometimes on the dorsum that is not a great deal of pain in either thigh or calf areas of low back pain is the greatest symptoms  Current Medications  Current Outpatient Medications   Medication Sig Dispense Refill    diclofenac sodium (VOLTAREN) 1 % Gel Apply 2 g topically 4 (four) times daily. 100 g 3    docusate sodium (COLACE) 100 MG capsule docusate sodium 100 mg capsule   TAKE 1 CAPSULE EVERY 12 HOURS      glucosam-chond-hyalu-CF borate ([facility] [facility]) 750 mg-100 mg- 1.65 mg-108 mg Tab Take 1 tablet by mouth once daily.      meloxicam (MOBIC) 15 MG tablet TAKE 1 TABLET BY MOUTH EVERY DAY 30 tablet 6    multivitamin capsule Take 1 capsule by mouth once daily.      ondansetron (ZOFRAN-ODT) 8 MG TbDL ondansetron 8 mg disintegrating tablet   DISSOLVE 1 TABLET UNDER TONGUE EVERY 8 HOURS AS NEEDED FOR NAUSEA      silver sulfADIAZINE 1% (SILVADENE) 1 % cream silver sulfadiazine 1 % topical cream   APPLY TO AFFECTED AREA TWICE A DAY      wheat dextrin (BENEFIBER CLEAR SF, DEXTRIN,) 3 gram/3.5 gram PwPk Take by mouth.      cetirizine (ZYRTEC) 10 MG tablet Take 1 tablet (10 mg total) by mouth once daily. 30 tablet 0     No current facility-administered medications for this visit.        Allergies  Review of patient's allergies indicates:   Allergen Reactions    Morphine Shortness Of Breath    Adhesive Rash     AGENT: Patient thinks it was  PAPER TAPE    Aspirin Other (See Comments)     Stomach pain    Penicillin g Other (See Comments)     Stomach pain    Morphine sulfate        Past Medical History  Past Medical History:   Diagnosis Date    Breast CA     Breast cancer 2013    left breast infiltrating ductal carcinoma, ER/MS positive, Her2 negative    SADIE (obstructive sleep apnea)     on cpap       Surgical History  Past Surgical History:   Procedure Laterality Date    BREAST BIOPSY  2013    left breast- IDC    BREAST SURGERY  10//    bilateral mastectomy and reconstruction-no implants     SECTION      x3    CHOLECYSTECTOMY      COLON SURGERY  2009    benign neoplasm recheck 2014, 12 inches    HYSTERECTOMY      TVH/BSO, benign    TONSILLECTOMY      TUBAL LIGATION         Family History:   Family History   Problem Relation Age of Onset    Cancer Mother         liver, bone, breast    Hypertension Mother     Heart disease Mother     Breast cancer Mother 63        initial dx ILC at 63 with recurrence at 67, 2nd CA IDC at 67    Cancer Father 62        bone ? breast    Diabetes Father     Breast cancer Maternal Aunt 50    Ovarian cancer Cousin 49        daughter of M Aunt with Breast CA       Social History:   Social History     Socioeconomic History    Marital status:      Spouse name: Not on file    Number of children: Not on file    Years of education: Not on file    Highest education level: Not on file   Occupational History    Not on file   Social Needs    Financial resource strain: Not hard at all    Food insecurity:     Worry: Never true     Inability: Never true    Transportation needs:     Medical: No     Non-medical: No   Tobacco Use    Smoking status: Never Smoker    Smokeless tobacco: Never Used   Substance and Sexual Activity    Alcohol use: Yes     Frequency: Monthly or less     Drinks per session: 1 or 2     Binge frequency: Never     Comment: rarely    Drug use: No    Sexual  activity: Yes     Partners: Male     Birth control/protection: Surgical   Lifestyle    Physical activity:     Days per week: 2 days     Minutes per session: 10 min    Stress: To some extent   Relationships    Social connections:     Talks on phone: Three times a week     Gets together: Three times a week     Attends Rastafarian service: Not on file     Active member of club or organization: No     Attends meetings of clubs or organizations: Never     Relationship status:    Other Topics Concern    Not on file   Social History Narrative    Not on file       Hospitalization/Major Diagnostic Procedure:     Review of Systems     General/Constitutional:  Chills denies. Fatigue denies. Fever denies. Weight gain denies. Weight loss denies.    Respiratory:  Shortness of breath denies.    Cardiovascular:  Chest pain denies.    Gastrointestinal:  Constipation denies. Diarrhea denies. Nausea denies. Vomiting denies.     Hematology:  Easy bruising denies. Prolonged bleeding denies.     Genitourinary:  Frequent urination denies. Pain in lower back denies. Painful urination denies.     Musculoskeletal:  See HPI for details    Skin:  Rash denies.    Neurologic:  Dizziness denies. Gait abnormalities denies. Seizures denies. Tingling/Numbess denies.    Psychiatric:  Anxiety denies. Depressed mood denies.     Objective:   Vital Signs:   Vitals:    10/28/19 1510   BP: 134/86   Pulse: 80        Physical Exam      General Examination:     Constitutional: The patient is alert and oriented to lace person and time. Mood is pleasant.     Head/Face: Normal facial features normal eyebrows    Eyes: Normal extraocular motion bilaterally    Lungs: Respirations are equal and unlabored    Gait is coordinated.    Cardiovascular: There are no swelling or varicosities present.    Lymphatic: Negative for adenopathy    Skin: Normal    Neurological: Level of consciousness normal. Oriented to place person and time and situation    Psychiatric:  Oriented to time place person and situation    Lumbar exam shows a moderate restriction of motion pain with extension and bending  X-ray interpretation:  Lumbar MRI was personally reviewed as retrolisthesis of L2 on L3 and and facet arthropathy in the upper lumbar spine  Assessment:       1. Lumbar facet arthropathy    2. Disc degeneration, lumbar    3. Scoliosis of lumbosacral spine, unspecified scoliosis type    4. Lumbar stenosis with neurogenic claudication        Plan:       Leandra was seen today for pain.    Diagnoses and all orders for this visit:    Lumbar facet arthropathy    Disc degeneration, lumbar  Comments:  L1-2,2-3    Scoliosis of lumbosacral spine, unspecified scoliosis type    Lumbar stenosis with neurogenic claudication         No follow-ups on file.    Referred to Pain Management for evaluation of medial branch blocks and possible upper lumbar rhizotomy patient not interested in surgical intervention I believe her pain is on the basis of degenerative disc disease in the upper lumbar spine.  She is not interested in surgical intervention.    This note was created using Dragon voice recognition software that occasionally misinterpreted phrases or words.

## 2019-11-05 ENCOUNTER — OFFICE VISIT (OUTPATIENT)
Dept: PAIN MEDICINE | Facility: CLINIC | Age: 54
End: 2019-11-05
Payer: COMMERCIAL

## 2019-11-05 VITALS
SYSTOLIC BLOOD PRESSURE: 131 MMHG | BODY MASS INDEX: 42.38 KG/M2 | DIASTOLIC BLOOD PRESSURE: 80 MMHG | HEART RATE: 74 BPM | HEIGHT: 67 IN | WEIGHT: 270 LBS

## 2019-11-05 DIAGNOSIS — M51.36 DDD (DEGENERATIVE DISC DISEASE), LUMBAR: Primary | ICD-10-CM

## 2019-11-05 DIAGNOSIS — M54.16 LUMBAR RADICULOPATHY: ICD-10-CM

## 2019-11-05 DIAGNOSIS — M47.896 OTHER SPONDYLOSIS, LUMBAR REGION: ICD-10-CM

## 2019-11-05 PROCEDURE — 99999 PR PBB SHADOW E&M-EST. PATIENT-LVL III: CPT | Mod: PBBFAC,,, | Performed by: ANESTHESIOLOGY

## 2019-11-05 PROCEDURE — 99999 PR PBB SHADOW E&M-EST. PATIENT-LVL III: ICD-10-PCS | Mod: PBBFAC,,, | Performed by: ANESTHESIOLOGY

## 2019-11-05 PROCEDURE — 99244 OFF/OP CNSLTJ NEW/EST MOD 40: CPT | Mod: S$GLB,,, | Performed by: ANESTHESIOLOGY

## 2019-11-05 PROCEDURE — 99244 PR OFFICE CONSULTATION,LEVEL IV: ICD-10-PCS | Mod: S$GLB,,, | Performed by: ANESTHESIOLOGY

## 2019-11-05 RX ORDER — GABAPENTIN 300 MG/1
CAPSULE ORAL
Qty: 90 CAPSULE | Refills: 11 | Status: SHIPPED | OUTPATIENT
Start: 2019-11-05 | End: 2020-01-13

## 2019-11-05 NOTE — PROGRESS NOTES
Referring Physician: Lauri Edwards MD    PCP: Sil Palumbo MD    CC: back pain    HPI:   Leandra Lopes is a 54 y.o. female with PMH significant for hx of breast cancer s/p surgical repair, SADIE on CPAP, and hx of partial colectomy presents as a referral for the evaluation of back pain. Back pain began approximately 10 years ago after no inciting incident or trauma. The pain was intermittent at that time and eventually resolved. Pain worsened approximately 3 years ago after no inciting incident or trauma. The patient subsequently participated in PT at that time with improvement of her pain. Pain recurrence 6 months ago after no inciting incident or trauma. This time around, PT did not provide benefit. The patient reports that her pain is similar to the pain she experienced approximately 10 years ago but reports that her pain is more intense at this time. Patient localizes her pain to the area across her lower back. Patient reports of radiation down the posterior aspect of her RLE into her heel. Back pain > RLE pain. Patient describes her pain as a stabbing type of pain. Patient reports that her current pain is a 7-8/10. Patient denies of any numbness or tingling in her lower extremities. Patient denies of any urinary/fecal incontinence, saddle anesthesia, or weakness.     Aggravating factors: prolonged standing; transitioning from the sitting to standing position, lifting, bending    Mitigating factors: meloxicam; voltaren gel; PT; heating pad    Relevant Surgeries: no    Interventional Therapies: no    : Not applicable    Non-pharmacologic Treatment:     · Physical Therapy: yes; last session was one week ago  · Ice/Heat: yes; heating pad with benefit   · TENS: yes; tens unit with temporary relief  · Massage: no  · Chiropractic care: no  · Acupuncture: no         Pain Medications:         · Currently taking: meloxicam; voltaren gel; OTC tylenol  ·   · Has tried in the past:    · Opioids: no  · NSAIDS:  yes  · Tylenol: yes  · Muscle relaxants: no  · TCAs: no  · SNRIs: no  · Anticonvulsants: no  · topical creams: yes    Anticoagulation: no    ROS:  Review of Systems   Constitutional: Negative for chills and fever.   HENT: Negative for sore throat.    Eyes: Negative for visual disturbance.   Respiratory: Negative for shortness of breath.    Cardiovascular: Negative for chest pain.   Gastrointestinal: Negative for nausea and vomiting.   Genitourinary: Negative for difficulty urinating.   Musculoskeletal: Positive for back pain.   Skin: Negative for rash.   Allergic/Immunologic: Negative for immunocompromised state.   Neurological: Negative for syncope.   Hematological: Does not bruise/bleed easily.   Psychiatric/Behavioral: Negative for suicidal ideas.        Past Medical History:   Diagnosis Date    Breast CA     Breast cancer 2013    left breast infiltrating ductal carcinoma, ER/MO positive, Her2 negative    SADIE (obstructive sleep apnea)     on cpap     Past Surgical History:   Procedure Laterality Date    BREAST BIOPSY  2013    left breast- IDC    BREAST SURGERY  10//    bilateral mastectomy and reconstruction-no implants     SECTION      x3    CHOLECYSTECTOMY      COLON SURGERY  2009    benign neoplasm recheck 2014, 12 inches    HYSTERECTOMY      TVH/BSO, benign    TONSILLECTOMY      TUBAL LIGATION       Family History   Problem Relation Age of Onset    Cancer Mother         liver, bone, breast    Hypertension Mother     Heart disease Mother     Breast cancer Mother 63        initial dx ILC at 63 with recurrence at 67, 2nd CA IDC at 67    Cancer Father 62        bone ? breast    Diabetes Father     Breast cancer Maternal Aunt 50    Ovarian cancer Cousin 49        daughter of M Aunt with Breast CA     Social History     Socioeconomic History    Marital status:      Spouse name: Not on file    Number of children: Not on file    Years of education: Not on file     "Highest education level: Not on file   Occupational History    Not on file   Social Needs    Financial resource strain: Not hard at all    Food insecurity:     Worry: Never true     Inability: Never true    Transportation needs:     Medical: No     Non-medical: No   Tobacco Use    Smoking status: Never Smoker    Smokeless tobacco: Never Used   Substance and Sexual Activity    Alcohol use: Yes     Frequency: Monthly or less     Drinks per session: 1 or 2     Binge frequency: Never     Comment: rarely    Drug use: No    Sexual activity: Yes     Partners: Male     Birth control/protection: Surgical   Lifestyle    Physical activity:     Days per week: 2 days     Minutes per session: 10 min    Stress: To some extent   Relationships    Social connections:     Talks on phone: Three times a week     Gets together: Three times a week     Attends Restoration service: Not on file     Active member of club or organization: No     Attends meetings of clubs or organizations: Never     Relationship status:    Other Topics Concern    Not on file   Social History Narrative    Not on file         Allergies: See med card    Vitals:    11/05/19 1306   BP: 131/80   Pulse: 74   Weight: 122.5 kg (270 lb)   Height: 5' 7" (1.702 m)   PainSc:   7   PainLoc: Back         Physical exam:  Physical Exam   Constitutional: She is oriented to person, place, and time and well-developed, well-nourished, and in no distress.   HENT:   Head: Normocephalic and atraumatic.   Eyes: Conjunctivae and EOM are normal. Right eye exhibits no discharge. Left eye exhibits no discharge.   Cardiovascular: Normal rate.   Pulmonary/Chest: Effort normal and breath sounds normal. No respiratory distress.   Abdominal: Soft.   Neurological: She is alert and oriented to person, place, and time.   Skin: Skin is warm and dry. No rash noted. She is not diaphoretic.   Psychiatric: Mood, memory, affect and judgment normal.   Nursing note and vitals " reviewed.       UPPER EXTREMITIES: Normal alignment, normal range of motion, no atrophy, no skin changes,  hair growth and nail growth normal and equal bilaterally. No swelling, no tenderness.    LOWER EXTREMITIES:  Normal alignment, normal range of motion, no atrophy, no skin changes,  hair growth and nail growth normal and equal bilaterally. No swelling, no tenderness.    LUMBAR SPINE  Lumbar spine: ROM is limited with flexion extension and oblique extension with increased pain on extension.     ((--)) Supine straight leg raise    ((+)) Facet loading bilaterally (R>L)  Internal and external rotation of the hip causes no increased pain on either side.  Myofascial exam: Tenderness to palpation across lumbar paraspinous muscles.    ((--)) TTP at the SI joint  ((+)) VAL's test on the right  ((--)) One leg stand    ((--)) Distraction test    CRANIAL NERVES:  II:  PERRL bilaterally,   III,IV,VI: EOMI.    V:  Facial sensation equal bilaterally  VII:  Facial motor function normal.  VIII:  Hearing equal to finger rub bilaterally  IX/X: Gag normal, palate symmetric  XI:  Shoulder shrug equal, head turn equal  XII:  Tongue midline without fasciculations      MOTOR: Tone and bulk: normal bilateral upper and lower Strength: normal   Delt Bi Tri WE WF     R 5 5 5 5 5 5   L 5 5 5 5 5 5     IP ADD ABD Quad TA Gas HAM  R 5 5 5 5 5 5 5  L 5 5 5 5 5 5 5    SENSATION: Light touch and pinprick intact bilaterally  REFLEXES: normal, symmetric, nonbrisk.  Toes down, no clonus. Negative gallagher's sign bilaterally.  GAIT: normal rise, base, steps, and arm swing.        Imaging: MRI Lumbar spine without contrast (10/24/2019):  Mild dextroscoliosis with minimal retrolisthesis of L1 on L2 and L2 on L3    Moderate multilevel degenerative disc disease and facet hypertrophy.    Broad-based disc bulge at L2-3 resulting in moderate central canal stenosis and foraminal narrowing.  Correlation with bilateral L2 radicular symptoms is  recommended.  There is a small central extruded disc fragment extending inferiorly posterior to the superior endplate of L3    Moderate right foraminal narrowing at L3-4 and correlation with right L3 radicular symptoms is recommended      Assessment: Leandra Lopes is a 54 y.o. female with PMH significant for hx of breast cancer s/p surgical repair, SADIE on CPAP, and hx of partial colectomy presents as a referral for the evaluation of back pain. Patient has pain with extension of her lumbar spine. MRI significant for DDD. I suspect discogenic and facetogenic contributions to her low back pain. Treatment plan outlined below.     Plan:  - Schedule for L2-L3 interlaminar epidural steroid injection via Dr. Meek per patient request   - Okay to continue meloxicam; voltaren gel; OTC tylenol as prescribed by PCP  - I have stressed the importance of physical activity and a home exercise plan to help with chronic pain and improve health.  - Start Gabapentin 300 MG, titrating up to an initial dose of 300 mg TID.  Will titrate up as tolerated to a goal dose between 1800mg and 3200 mg daily. Stay at most comfortable dose. May increase further if tolerated. Advised of possible side effects including sedation, dizziness, and swelling in the extremities.   - RTC for the procedure as listed above      Thank you for referring this interesting patient, and I look forward to continuing to collaborate in her care.    Jaylon Burrell MD  Pain Management

## 2019-11-05 NOTE — LETTER
November 5, 2019      Lauri Edwards MD  [address] [facility]  [address]  [place] [address]           [place] [facility]  [address] [place] [address] [address]  [place] [address]  Phone: [phone]  Fax: [phone]          Patient: Leandra Lopes   MR Number: [id number]   YOB: 1965   Date of Visit: 11/5/2019       Dear Dr. Lauri Edwards:    Thank you for referring Leandra Lopes to me for evaluation. Attached you will find relevant portions of my assessment and plan of care.    If you have questions, please do not hesitate to call me. I look forward to following Leandra Lopes along with you.    Sincerely,    Jaylon Burrell MD    Enclosure  CC:  No Recipients    If you would like to receive this communication electronically, please contact [email or web address] or [phone] to request more information on [employer] Link access.    For providers and/or their staff who would like to refer a patient to [facility], please contact us through our one-stop-shop provider referral line, [facility] , at [phone].    If you feel you have received this communication in error or would no longer like to receive these types of communications, please e-mail [email or web address]

## 2019-11-08 ENCOUNTER — OFFICE VISIT (OUTPATIENT)
Dept: ORTHOPEDICS | Facility: CLINIC | Age: 54
End: 2019-11-08
Payer: OTHER MISCELLANEOUS

## 2019-11-08 VITALS
SYSTOLIC BLOOD PRESSURE: 108 MMHG | DIASTOLIC BLOOD PRESSURE: 70 MMHG | HEART RATE: 71 BPM | WEIGHT: 270 LBS | BODY MASS INDEX: 42.29 KG/M2

## 2019-11-08 DIAGNOSIS — M54.6 ACUTE RIGHT-SIDED THORACIC BACK PAIN: ICD-10-CM

## 2019-11-08 DIAGNOSIS — S39.012A STRAIN OF LUMBAR REGION, INITIAL ENCOUNTER: ICD-10-CM

## 2019-11-08 DIAGNOSIS — M54.50 LUMBAR PAIN: Primary | ICD-10-CM

## 2019-11-08 DIAGNOSIS — S29.019A THORACIC MYOFASCIAL STRAIN, INITIAL ENCOUNTER: ICD-10-CM

## 2019-11-08 DIAGNOSIS — M46.06 SPINAL ENTHESOPATHY OF LUMBAR REGION: ICD-10-CM

## 2019-11-08 PROCEDURE — 20552 TRIGGER POINT: ICD-10-PCS | Mod: S$GLB,,, | Performed by: PHYSICIAN ASSISTANT

## 2019-11-08 PROCEDURE — 99214 OFFICE O/P EST MOD 30 MIN: CPT | Mod: 25,S$GLB,, | Performed by: PHYSICIAN ASSISTANT

## 2019-11-08 PROCEDURE — 99214 PR OFFICE/OUTPT VISIT, EST, LEVL IV, 30-39 MIN: ICD-10-PCS | Mod: 25,S$GLB,, | Performed by: PHYSICIAN ASSISTANT

## 2019-11-08 PROCEDURE — 20552 NJX 1/MLT TRIGGER POINT 1/2: CPT | Mod: S$GLB,,, | Performed by: PHYSICIAN ASSISTANT

## 2019-11-08 RX ORDER — TRAMADOL HYDROCHLORIDE 50 MG/1
50 TABLET ORAL EVERY 6 HOURS PRN
Qty: 28 TABLET | Refills: 2 | Status: ON HOLD | OUTPATIENT
Start: 2019-11-08 | End: 2019-11-26

## 2019-11-08 RX ORDER — ORPHENADRINE CITRATE 100 MG/1
100 TABLET, EXTENDED RELEASE ORAL 2 TIMES DAILY
Qty: 60 TABLET | Refills: 2 | Status: SHIPPED | OUTPATIENT
Start: 2019-11-08 | End: 2019-12-09 | Stop reason: SDUPTHER

## 2019-11-08 RX ORDER — METHYLPREDNISOLONE ACETATE 40 MG/ML
40 INJECTION, SUSPENSION INTRA-ARTICULAR; INTRALESIONAL; INTRAMUSCULAR; SOFT TISSUE
Status: DISCONTINUED | OUTPATIENT
Start: 2019-11-08 | End: 2019-11-08 | Stop reason: HOSPADM

## 2019-11-08 RX ADMIN — METHYLPREDNISOLONE ACETATE 40 MG: 40 INJECTION, SUSPENSION INTRA-ARTICULAR; INTRALESIONAL; INTRAMUSCULAR; SOFT TISSUE at 09:11

## 2019-11-08 NOTE — H&P (VIEW-ONLY)
Subjective:       Patient ID: Leandra Lopes is a 54 y.o. female.    Chief Complaint: Pain of the Lumbar Spine (WC Lumbar injury at work 11-7-19 when a student fell on her as she was sitting in a chair and the way she twisted caused the injury. Pain does not radiate. )      History of Present Illness  Patient is here today for evaluation of thoracolumbar pain left greater than right that started yesterday.  This is a work injury.  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  The pain started immediately.  No other symptoms associated with this injury at least at this time. These symptoms were not present prior to the injury that occurred yesterday.    Patient also has a past medical history of right-sided thoracolumbar pain and bilateral lumbar and lumbosacral pain with right lower extremity radiculitis and dysesthesia all the way to the heel and foot. This is chronic and has been present for multiple years.  This pain is unchanged from the injury/incident that occurred yesterday.  She was recently seen by us for her chronic symptomatology.  She failed physical therapy, we ordered an updated lumbar MRI and reviewed with her, and most recently referred her to Pain Management for lumbar medial branch blocks with progression towards a rhizotomy.  She has been evaluated by a pain management specialist and is scheduled for a procedure in the near future.        Current Medications  Current Outpatient Medications   Medication Sig Dispense Refill    diclofenac sodium (VOLTAREN) 1 % Gel Apply 2 g topically 4 (four) times daily. 100 g 3    docusate sodium (COLACE) 100 MG capsule docusate sodium 100 mg capsule   TAKE 1 CAPSULE EVERY 12 HOURS      gabapentin (NEURONTIN) 300 MG capsule 1st week: take one tablet QHS. 2nd week: take one tablet BID. 3rd week: take one tablet TID 90 capsule 11    glucosam-chond-hyalu-CF borate ([employer]) 750 mg-100 mg-  1.65 mg-108 mg Tab Take 1 tablet by mouth once daily.      meloxicam (MOBIC) 15 MG tablet TAKE 1 TABLET BY MOUTH EVERY DAY 30 tablet 6    multivitamin capsule Take 1 capsule by mouth once daily.      ondansetron (ZOFRAN-ODT) 8 MG TbDL ondansetron 8 mg disintegrating tablet   DISSOLVE 1 TABLET UNDER TONGUE EVERY 8 HOURS AS NEEDED FOR NAUSEA      silver sulfADIAZINE 1% (SILVADENE) 1 % cream silver sulfadiazine 1 % topical cream   APPLY TO AFFECTED AREA TWICE A DAY      wheat dextrin (BENEFIBER CLEAR SF, DEXTRIN,) 3 gram/3.5 gram PwPk Take by mouth.      cetirizine (ZYRTEC) 10 MG tablet Take 1 tablet (10 mg total) by mouth once daily. 30 tablet 0     No current facility-administered medications for this visit.        Allergies  Review of patient's allergies indicates:   Allergen Reactions    Morphine Shortness Of Breath    Adhesive Rash     AGENT: Patient thinks it was PAPER TAPE    Aspirin Other (See Comments)     Stomach pain    Penicillin g Other (See Comments)     Stomach pain    Morphine sulfate        Past Medical History  Past Medical History:   Diagnosis Date    Breast CA     Breast cancer 2013    left breast infiltrating ductal carcinoma, ER/IN positive, Her2 negative    SADIE (obstructive sleep apnea)     on cpap       Surgical History  Past Surgical History:   Procedure Laterality Date    BREAST BIOPSY  2013    left breast- IDC    BREAST SURGERY  10//    bilateral mastectomy and reconstruction-no implants     SECTION      x3    CHOLECYSTECTOMY      COLON SURGERY  2009    benign neoplasm recheck 2014, 12 inches    HYSTERECTOMY      TVH/BSO, benign    TONSILLECTOMY      TUBAL LIGATION         Family History:   Family History   Problem Relation Age of Onset    Cancer Mother         liver, bone, breast    Hypertension Mother     Heart disease Mother     Breast cancer Mother 63        initial dx ILC at 63 with recurrence at 67, 2nd CA IDC at 67    Cancer Father 62         bone ? breast    Diabetes Father     Breast cancer Maternal Aunt 50    Ovarian cancer Cousin 49        daughter of M Aunt with Breast CA       Social History:   Social History     Socioeconomic History    Marital status:      Spouse name: Not on file    Number of children: Not on file    Years of education: Not on file    Highest education level: Not on file   Occupational History    Not on file   Social Needs    Financial resource strain: Not hard at all    Food insecurity:     Worry: Never true     Inability: Never true    Transportation needs:     Medical: No     Non-medical: No   Tobacco Use    Smoking status: Never Smoker    Smokeless tobacco: Never Used   Substance and Sexual Activity    Alcohol use: Yes     Frequency: Monthly or less     Drinks per session: 1 or 2     Binge frequency: Never     Comment: rarely    Drug use: No    Sexual activity: Yes     Partners: Male     Birth control/protection: Surgical   Lifestyle    Physical activity:     Days per week: 2 days     Minutes per session: 10 min    Stress: To some extent   Relationships    Social connections:     Talks on phone: Three times a week     Gets together: Three times a week     Attends Restoration service: Not on file     Active member of club or organization: No     Attends meetings of clubs or organizations: Never     Relationship status:    Other Topics Concern    Not on file   Social History Narrative    Not on file       Hospitalization/Major Diagnostic Procedure:     Review of Systems     General/Constitutional:  Chills denies. Fatigue denies. Fever denies. Weight gain denies. Weight loss denies.    Respiratory:  Shortness of breath denies.    Cardiovascular:  Chest pain denies.    Gastrointestinal:  Constipation denies. Diarrhea denies. Nausea denies. Vomiting denies.     Hematology:  Easy bruising denies. Prolonged bleeding denies.     Genitourinary:  Frequent urination denies. Pain in lower back denies.  Painful urination denies.     Musculoskeletal:  See HPI for details    Skin:  Rash denies.    Neurologic:  Dizziness denies. Gait abnormalities denies. Seizures denies. Tingling/Numbess denies.    Psychiatric:  Anxiety denies. Depressed mood denies.     Objective:   Vital Signs:   Vitals:    11/08/19 0903   BP: 108/70   Pulse: 71        Physical Exam      General Examination:     Constitutional: The patient is alert and oriented to lace person and time. Mood is pleasant.     Head/Face: Normal facial features normal eyebrows    Eyes: Normal extraocular motion bilaterally    Lungs: Respirations are equal and unlabored    Gait is coordinated.    Cardiovascular: There are no swelling or varicosities present.    Lymphatic: Negative for adenopathy    Skin: Normal    Neurological: Level of consciousness normal. Oriented to place person and time and situation    Psychiatric: Oriented to time place person and situation     Lumbar exam:  Significant obesity.  Tenderness to palpation of the left thoracolumbar spine. Limited lumbar range of motion in all planes.  Obvious scoliotic deformity is noted. Bilateral lower extremities are distal neurovascular intact.      XRAY Report/ Interpretation:  Lumbar AP and lateral x-rays taken in the office today reviewed the patient demonstrates significant advanced multilevel degenerative disc disease with moderate scoliosis and facet arthropathy and multiple areas of osteophyte production      Assessment:       1. Lumbar pain    2. Acute right-sided thoracic back pain    3. Strain of lumbar region, initial encounter    4. Thoracic myofascial strain, initial encounter    5. Spinal enthesopathy of lumbar region        Plan:       Leandra was seen today for pain.    Diagnoses and all orders for this visit:    Lumbar pain  -     X-Ray Lumbar Spine Ap And Lateral    Acute right-sided thoracic back pain    Strain of lumbar region, initial encounter    Thoracic myofascial strain, initial  encounter    Spinal enthesopathy of lumbar region         No follow-ups on file.    At this time we are almost positive that her left thoracolumbar pain is specifically related are caused by the work injury that occurred yesterday.  These symptoms were not present prior to this work injury despite the fact that she has some pre-existing degenerative disc disease. Therefore chronologically we feel confident in saying that her current left thoracolumbar pain is work related.  We recommend a formal course of structured physical therapy for this specific pain related to this specific work injury.  Also she was given a left thoracolumbar trigger point injection with 1 cc of lidocaine and 40 mg of Depo-Medrol.  We changed her medications around some.  Norflex 100 mg b.i.d. and tramadol 50 mg up to 4 times a day as needed for pain.    We also discussed the fact that the patient has been taking an NSAID for for multiple years and has a history of reflux.  I recommend that she discontinue the NSAID for at least a month and treat herself with an over-the-counter proton pump inhibitor.      This note was created using Dragon voice recognition software that occasionally misinterpreted phrases or words.

## 2019-11-08 NOTE — PROGRESS NOTES
Subjective:       Patient ID: Leandra Lopes is a 54 y.o. female.    Chief Complaint: Pain of the Lumbar Spine (WC Lumbar injury at work 11-7-19 when a student fell on her as she was sitting in a chair and the way she twisted caused the injury. Pain does not radiate. )      History of Present Illness  Patient is here today for evaluation of thoracolumbar pain left greater than right that started yesterday.  This is a work injury.  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  The pain started immediately.  No other symptoms associated with this injury at least at this time. These symptoms were not present prior to the injury that occurred yesterday.    Patient also has a past medical history of right-sided thoracolumbar pain and bilateral lumbar and lumbosacral pain with right lower extremity radiculitis and dysesthesia all the way to the heel and foot. This is chronic and has been present for multiple years.  This pain is unchanged from the injury/incident that occurred yesterday.  She was recently seen by us for her chronic symptomatology.  She failed physical therapy, we ordered an updated lumbar MRI and reviewed with her, and most recently referred her to Pain Management for lumbar medial branch blocks with progression towards a rhizotomy.  She has been evaluated by a pain management specialist and is scheduled for a procedure in the near future.        Current Medications  Current Outpatient Medications   Medication Sig Dispense Refill    diclofenac sodium (VOLTAREN) 1 % Gel Apply 2 g topically 4 (four) times daily. 100 g 3    docusate sodium (COLACE) 100 MG capsule docusate sodium 100 mg capsule   TAKE 1 CAPSULE EVERY 12 HOURS      gabapentin (NEURONTIN) 300 MG capsule 1st week: take one tablet QHS. 2nd week: take one tablet BID. 3rd week: take one tablet TID 90 capsule 11    glucosam-chond-hyalu-CF borate ([employer]) 750 mg-100 mg-  1.65 mg-108 mg Tab Take 1 tablet by mouth once daily.      meloxicam (MOBIC) 15 MG tablet TAKE 1 TABLET BY MOUTH EVERY DAY 30 tablet 6    multivitamin capsule Take 1 capsule by mouth once daily.      ondansetron (ZOFRAN-ODT) 8 MG TbDL ondansetron 8 mg disintegrating tablet   DISSOLVE 1 TABLET UNDER TONGUE EVERY 8 HOURS AS NEEDED FOR NAUSEA      silver sulfADIAZINE 1% (SILVADENE) 1 % cream silver sulfadiazine 1 % topical cream   APPLY TO AFFECTED AREA TWICE A DAY      wheat dextrin (BENEFIBER CLEAR SF, DEXTRIN,) 3 gram/3.5 gram PwPk Take by mouth.      cetirizine (ZYRTEC) 10 MG tablet Take 1 tablet (10 mg total) by mouth once daily. 30 tablet 0     No current facility-administered medications for this visit.        Allergies  Review of patient's allergies indicates:   Allergen Reactions    Morphine Shortness Of Breath    Adhesive Rash     AGENT: Patient thinks it was PAPER TAPE    Aspirin Other (See Comments)     Stomach pain    Penicillin g Other (See Comments)     Stomach pain    Morphine sulfate        Past Medical History  Past Medical History:   Diagnosis Date    Breast CA     Breast cancer 2013    left breast infiltrating ductal carcinoma, ER/[place] positive, Her2 negative    SADIE (obstructive sleep apnea)     on cpap       Surgical History  Past Surgical History:   Procedure Laterality Date    BREAST BIOPSY  2013    left breast- IDC    BREAST SURGERY  10//    bilateral mastectomy and reconstruction-no implants     SECTION      x3    CHOLECYSTECTOMY      COLON SURGERY  2009    benign neoplasm recheck 2014, 12 inches    HYSTERECTOMY      TVH/BSO, benign    TONSILLECTOMY      TUBAL LIGATION         Family History:   Family History   Problem Relation Age of Onset    Cancer Mother         liver, bone, breast    Hypertension Mother     Heart disease Mother     Breast cancer Mother 63        initial dx ILC at 63 with recurrence at 67, 2nd CA IDC at 67    Cancer Father 62         bone ? breast    Diabetes Father     Breast cancer Maternal Aunt 50    Ovarian cancer Cousin 49        daughter of M Aunt with Breast CA       Social History:   Social History     Socioeconomic History    Marital status:      Spouse name: Not on file    Number of children: Not on file    Years of education: Not on file    Highest education level: Not on file   Occupational History    Not on file   Social Needs    Financial resource strain: Not hard at all    Food insecurity:     Worry: Never true     Inability: Never true    Transportation needs:     Medical: No     Non-medical: No   Tobacco Use    Smoking status: Never Smoker    Smokeless tobacco: Never Used   Substance and Sexual Activity    Alcohol use: Yes     Frequency: Monthly or less     Drinks per session: 1 or 2     Binge frequency: Never     Comment: rarely    Drug use: No    Sexual activity: Yes     Partners: Male     Birth control/protection: Surgical   Lifestyle    Physical activity:     Days per week: 2 days     Minutes per session: 10 min    Stress: To some extent   Relationships    Social connections:     Talks on phone: Three times a week     Gets together: Three times a week     Attends [employer] service: Not on file     Active member of club or organization: No     Attends meetings of clubs or organizations: Never     Relationship status:    Other Topics Concern    Not on file   Social History Narrative    Not on file       Hospitalization/Major Diagnostic Procedure:     Review of Systems     General/Constitutional:  Chills denies. Fatigue denies. Fever denies. Weight gain denies. Weight loss denies.    Respiratory:  Shortness of breath denies.    Cardiovascular:  Chest pain denies.    Gastrointestinal:  Constipation denies. Diarrhea denies. Nausea denies. Vomiting denies.     Hematology:  Easy bruising denies. Prolonged bleeding denies.     Genitourinary:  Frequent urination denies. Pain in lower back denies.  Painful urination denies.     Musculoskeletal:  See HPI for details    Skin:  Rash denies.    Neurologic:  Dizziness denies. Gait abnormalities denies. Seizures denies. Tingling/Numbess denies.    Psychiatric:  Anxiety denies. Depressed mood denies.     Objective:   Vital Signs:   Vitals:    11/08/19 0903   BP: 108/70   Pulse: 71        Physical Exam      General Examination:     Constitutional: The patient is alert and oriented to lace person and time. Mood is pleasant.     Head/Face: Normal facial features normal eyebrows    Eyes: Normal extraocular motion bilaterally    Lungs: Respirations are equal and unlabored    Gait is coordinated.    Cardiovascular: There are no swelling or varicosities present.    Lymphatic: Negative for adenopathy    Skin: Normal    Neurological: Level of consciousness normal. Oriented to place person and time and situation    Psychiatric: Oriented to time place person and situation     Lumbar exam:  Significant obesity.  Tenderness to palpation of the left thoracolumbar spine. Limited lumbar range of motion in all planes.  Obvious scoliotic deformity is noted. Bilateral lower extremities are distal neurovascular intact.      XRAY Report/ Interpretation:  Lumbar AP and lateral x-rays taken in the office today reviewed the patient demonstrates significant advanced multilevel degenerative disc disease with moderate scoliosis and facet arthropathy and multiple areas of osteophyte production      Assessment:       1. Lumbar pain    2. Acute right-sided thoracic back pain    3. Strain of lumbar region, initial encounter    4. Thoracic myofascial strain, initial encounter    5. Spinal enthesopathy of lumbar region        Plan:       Leandra was seen today for pain.    Diagnoses and all orders for this visit:    Lumbar pain  -     X-Ray Lumbar Spine Ap And Lateral    Acute right-sided thoracic back pain    Strain of lumbar region, initial encounter    Thoracic myofascial strain, initial  encounter    Spinal enthesopathy of lumbar region         No follow-ups on file.    At this time we are almost positive that her left thoracolumbar pain is specifically related are caused by the work injury that occurred yesterday.  These symptoms were not present prior to this work injury despite the fact that she has some pre-existing degenerative disc disease. Therefore chronologically we feel confident in saying that her current left thoracolumbar pain is work related.  We recommend a formal course of structured physical therapy for this specific pain related to this specific work injury.  Also she was given a left thoracolumbar trigger point injection with 1 cc of lidocaine and 40 mg of Depo-Medrol.  We changed her medications around some.  Norflex 100 mg b.i.d. and tramadol 50 mg up to 4 times a day as needed for pain.    We also discussed the fact that the patient has been taking an NSAID for for multiple years and has a history of reflux.  I recommend that she discontinue the NSAID for at least a month and treat herself with an over-the-counter proton pump inhibitor.      This note was created using Dragon voice recognition software that occasionally misinterpreted phrases or words.

## 2019-11-08 NOTE — PROCEDURES
Trigger Point  Date/Time: 11/8/2019 9:00 AM  Performed by: JAIME Mora  Authorized by: JAIME Mora     Consent Done?:  Yes (Verbal)  Timeout: prior to procedure the correct patient, procedure, and site was verified    Indications:  Pain  Site marked: the procedure site was marked    Timeout: prior to procedure the correct patient, procedure, and site was verified    Location: left sided lumbar.  Prep: patient was prepped and draped in usual sterile fashion    Ultrasonic guidance for needle placement?: No    Needle size:  25 G  Medications:  40 mg methylPREDNISolone acetate 40 mg/mL  Patient tolerance:  Patient tolerated the procedure well with no immediate complications

## 2019-11-11 RX ORDER — MELOXICAM 15 MG/1
15 TABLET ORAL DAILY
Qty: 30 TABLET | Refills: 6 | OUTPATIENT
Start: 2019-11-11

## 2019-11-12 ENCOUNTER — TELEPHONE (OUTPATIENT)
Dept: PAIN MEDICINE | Facility: CLINIC | Age: 54
End: 2019-11-12

## 2019-11-13 ENCOUNTER — TELEPHONE (OUTPATIENT)
Dept: PAIN MEDICINE | Facility: CLINIC | Age: 54
End: 2019-11-13

## 2019-11-13 NOTE — TELEPHONE ENCOUNTER
Called pt and she would like this procedure with Dr. Meek to go through her primary insurance [employer].

## 2019-11-13 NOTE — TELEPHONE ENCOUNTER
----- Message from Maurice Mcfadden sent at 11/13/2019  7:11 AM CST -----  Type: Needs Medical Advice    Who Called: self   Symptoms (please be specific):  How long has patient had these symptoms:   Pharmacy name and phone #:   Best Call Back Number: [phone]  Additional Information: Patient have questions regarding epidural injection.

## 2019-11-18 ENCOUNTER — TELEPHONE (OUTPATIENT)
Dept: PAIN MEDICINE | Facility: CLINIC | Age: 54
End: 2019-11-18

## 2019-11-18 NOTE — TELEPHONE ENCOUNTER
----- Message from Sapphire Forrest sent at 11/18/2019  3:37 PM CST -----  Contact: pt  Pt calling states there was some kind of mix up about her getting a epidural next Tuesday and she is wanting to know if it was all fixed, is it still going to be done next tuesday..[phone] ([place])

## 2019-11-19 ENCOUNTER — TELEPHONE (OUTPATIENT)
Dept: PAIN MEDICINE | Facility: CLINIC | Age: 54
End: 2019-11-19

## 2019-11-19 NOTE — TELEPHONE ENCOUNTER
----- Message from Yvonne Mckeon sent at 11/19/2019  3:23 PM CST -----  Contact: patient  Type: Needs Medical Advice    Who Called:  patient  Best Call Back Number: [phone]  Additional Information: patient states that she has a procedure scheduled for 11/26/2019. Patient states that she is currently taking gabapentin, norflex, and prevacid 24 and need to if she need to stop taking the medications before the procedure. Please give call back

## 2019-11-26 ENCOUNTER — HOSPITAL ENCOUNTER (OUTPATIENT)
Facility: AMBULARY SURGERY CENTER | Age: 54
Discharge: HOME | End: 2019-11-26
Attending: ANESTHESIOLOGY | Admitting: ANESTHESIOLOGY
Payer: COMMERCIAL

## 2019-11-26 DIAGNOSIS — M54.16 LUMBAR RADICULITIS: Primary | ICD-10-CM

## 2019-11-26 PROCEDURE — 62323 PR INJ LUMBAR/SACRAL, W/IMAGING GUIDANCE: ICD-10-PCS | Mod: ,,, | Performed by: ANESTHESIOLOGY

## 2019-11-26 PROCEDURE — 62323 NJX INTERLAMINAR LMBR/SAC: CPT | Mod: ,,, | Performed by: ANESTHESIOLOGY

## 2019-11-26 PROCEDURE — 99152 MOD SED SAME PHYS/QHP 5/>YRS: CPT | Mod: ,,, | Performed by: ANESTHESIOLOGY

## 2019-11-26 PROCEDURE — 99152 PR MOD CONSCIOUS SEDATION, SAME PHYS, 5+ YRS, FIRST 15 MIN: ICD-10-PCS | Mod: ,,, | Performed by: ANESTHESIOLOGY

## 2019-11-26 PROCEDURE — 62323 NJX INTERLAMINAR LMBR/SAC: CPT | Performed by: ANESTHESIOLOGY

## 2019-11-26 RX ORDER — LIDOCAINE HYDROCHLORIDE 10 MG/ML
INJECTION, SOLUTION EPIDURAL; INFILTRATION; INTRACAUDAL; PERINEURAL
Status: DISCONTINUED
Start: 2019-11-26 | End: 2019-11-26 | Stop reason: HOSPADM

## 2019-11-26 RX ORDER — LIDOCAINE HYDROCHLORIDE 10 MG/ML
INJECTION, SOLUTION EPIDURAL; INFILTRATION; INTRACAUDAL; PERINEURAL
Status: DISCONTINUED | OUTPATIENT
Start: 2019-11-26 | End: 2019-11-26 | Stop reason: HOSPADM

## 2019-11-26 RX ORDER — FENTANYL CITRATE 50 UG/ML
INJECTION, SOLUTION INTRAMUSCULAR; INTRAVENOUS
Status: DISCONTINUED | OUTPATIENT
Start: 2019-11-26 | End: 2019-11-26 | Stop reason: HOSPADM

## 2019-11-26 RX ORDER — SODIUM CHLORIDE 9 MG/ML
INJECTION, SOLUTION INTRAMUSCULAR; INTRAVENOUS; SUBCUTANEOUS
Status: DISCONTINUED | OUTPATIENT
Start: 2019-11-26 | End: 2019-11-26 | Stop reason: HOSPADM

## 2019-11-26 RX ORDER — DEXAMETHASONE SODIUM PHOSPHATE 10 MG/ML
INJECTION INTRAMUSCULAR; INTRAVENOUS
Status: DISCONTINUED | OUTPATIENT
Start: 2019-11-26 | End: 2019-11-26 | Stop reason: HOSPADM

## 2019-11-26 RX ORDER — SODIUM CHLORIDE, SODIUM LACTATE, POTASSIUM CHLORIDE, CALCIUM CHLORIDE 600; 310; 30; 20 MG/100ML; MG/100ML; MG/100ML; MG/100ML
INJECTION, SOLUTION INTRAVENOUS ONCE AS NEEDED
Status: COMPLETED | OUTPATIENT
Start: 2019-11-26 | End: 2019-11-26

## 2019-11-26 RX ORDER — MIDAZOLAM HYDROCHLORIDE 2 MG/2ML
INJECTION, SOLUTION INTRAMUSCULAR; INTRAVENOUS
Status: DISCONTINUED | OUTPATIENT
Start: 2019-11-26 | End: 2019-11-26 | Stop reason: HOSPADM

## 2019-11-26 RX ADMIN — SODIUM CHLORIDE, SODIUM LACTATE, POTASSIUM CHLORIDE, CALCIUM CHLORIDE: 600; 310; 30; 20 INJECTION, SOLUTION INTRAVENOUS at 10:11

## 2019-11-26 NOTE — DISCHARGE INSTRUCTIONS
Before leaving, please make sure you have all your personal belongings such as glasses, purses, wallets, keys, cell phones, jewelry, jackets etc     Pain injection instructions:     This procedure may take a couple weeks to relieve pain  You may get some pain relief from the local anesthetic initally.    No driving for 24 hrs.   Activity as tolerated- gradually increase activities.  Dont lift over 10 lbs for 24 hrs   No heat at injection sites x 2 days. No heating pads, hot tubs, saunas, or swimming in any body of water or pool for 2 days.  Use ice pack for mild swelling and for comfort , apply for 20 minutes, remove for 20 minute intervals. No direct contact of ice itself  to skin.  May shower today. No tub baths for two days.      Resume Aspirin, Plavix, or Coumadin the day after the procedure unless otherwise instructed.   If diabetic,monitor your glucose carefully as steroids can increase your glucose level    Seek immediate medical help for:   Severe increase in your usual pain or appearance of new pain.  Prolonged (mor than 8 hours) or increasing weakness or numbness in the legs or arms. Numbing medicine was injected and can affect the messages to and from the brain and legs or arms.  .    Fever above 101 ,Drainage,redness,active bleeding, or increased swelling at the injection site.  Headache, shortness of breath, chest pain, or breathing problems.    After Surgery:  Always be aware that any surgery can cause these symptoms:    Pain- Medication can be prescribed for pain to decrease your pain but may not completely take your pain away. Over the Counter pain medicine my be enough and you can always use Ice and rest to help ease pain.    Bleeding- a little bleeding after a surgery is usually within normal.  If there is a lot of blood you need to notify your MD.  Emergency treatments of bleeding are cold application, elevation of the bleeding site and compression.    Infection- Infection after surgery is NOT a  normal occurrence.  Signs of infection are fever, swelling, hot to touch the incision.  If this occurs notify your MD immediately.    Nausea- this can be common after a surgery especially if you have had anesthesia medicine or are taking pain medicine.  Steroids have a side effect of nausea sometimes. Staying on clear liquids, bland foods, gingerale, or over the counter anti nausea medicines can help.  If you vomit more than once, notify your MD.  Anti Nausea medicines can be prescribed.

## 2019-11-26 NOTE — DISCHARGE SUMMARY
[facility]  Discharge Note  Short Stay    Admit Date: 11/26/2019    Discharge Date and Time: 11/26/2019    Attending Physician: Niko Meek MD     Discharge Provider: Niko Meek    Diagnoses:  Active Hospital Problems    Diagnosis  POA    *Lumbar radiculitis [M54.16]  Yes      Resolved Hospital Problems   No resolved problems to display.       Hospital Course: Lumbar NISHA  Discharged Condition: Good    Final Diagnoses:   Active Hospital Problems    Diagnosis  POA    *Lumbar radiculitis [M54.16]  Yes      Resolved Hospital Problems   No resolved problems to display.       Disposition: Home or Self Care    Follow up/Patient Instructions:    Medications:  Reconciled Home Medications:      Medication List      CONTINUE taking these medications    Benefiber Clear SF (dextrin) 3 gram/3.5 gram Pwpk  Generic drug:  wheat dextrin  Take by mouth.     cetirizine 10 MG tablet  Commonly known as:  ZYRTEC  Take 1 tablet (10 mg total) by mouth once daily.     docusate sodium 100 MG capsule  Commonly known as:  COLACE  docusate sodium 100 mg capsule   TAKE 1 CAPSULE EVERY 12 HOURS     gabapentin 300 MG capsule  Commonly known as:  NEURONTIN  1st week: take one tablet QHS. 2nd week: take one tablet BID. 3rd week: take one tablet TID     [employer] 750 mg-100 mg- 1.65 mg-108 mg Tab  Generic drug:  glucosam-chond-hyalu-CF borate  Take 1 tablet by mouth once daily.     multivitamin capsule  Take 1 capsule by mouth once daily.     orphenadrine 100 mg tablet  Commonly known as:  NORFLEX  Take 1 tablet (100 mg total) by mouth 2 (two) times daily.          Discharge Procedure Orders   Call MD for:  temperature >100.4     Call MD for:  persistent nausea and vomiting or diarrhea     Call MD for:  severe uncontrolled pain     Call MD for:  redness, tenderness, or signs of infection (pain, swelling, redness, odor or green/yellow discharge around incision site)     Call MD for:  difficulty breathing or increased cough      Call MD for:  severe persistent headache        Follow up with MD in 2-3 weeks    Discharge Procedure Orders (must include Diet, Follow-up, Activity):   Discharge Procedure Orders (must include Diet, Follow-up, Activity)   Call MD for:  temperature >100.4     Call MD for:  persistent nausea and vomiting or diarrhea     Call MD for:  severe uncontrolled pain     Call MD for:  redness, tenderness, or signs of infection (pain, swelling, redness, odor or green/yellow discharge around incision site)     Call MD for:  difficulty breathing or increased cough     Call MD for:  severe persistent headache

## 2019-11-27 VITALS
TEMPERATURE: 98 F | WEIGHT: 270 LBS | SYSTOLIC BLOOD PRESSURE: 122 MMHG | BODY MASS INDEX: 42.29 KG/M2 | RESPIRATION RATE: 18 BRPM | OXYGEN SATURATION: 95 % | DIASTOLIC BLOOD PRESSURE: 63 MMHG | HEART RATE: 72 BPM

## 2019-12-09 ENCOUNTER — OFFICE VISIT (OUTPATIENT)
Dept: ORTHOPEDICS | Facility: CLINIC | Age: 54
End: 2019-12-09
Payer: COMMERCIAL

## 2019-12-09 VITALS — SYSTOLIC BLOOD PRESSURE: 130 MMHG | WEIGHT: 270 LBS | DIASTOLIC BLOOD PRESSURE: 80 MMHG | BODY MASS INDEX: 42.29 KG/M2

## 2019-12-09 DIAGNOSIS — M54.6 ACUTE RIGHT-SIDED THORACIC BACK PAIN: ICD-10-CM

## 2019-12-09 DIAGNOSIS — M46.06 SPINAL ENTHESOPATHY OF LUMBAR REGION: ICD-10-CM

## 2019-12-09 DIAGNOSIS — S29.019A THORACIC MYOFASCIAL STRAIN, INITIAL ENCOUNTER: ICD-10-CM

## 2019-12-09 DIAGNOSIS — M54.6 ACUTE RIGHT-SIDED THORACIC BACK PAIN: Primary | ICD-10-CM

## 2019-12-09 DIAGNOSIS — M54.50 LUMBAR PAIN: ICD-10-CM

## 2019-12-09 DIAGNOSIS — S39.012A STRAIN OF LUMBAR REGION, INITIAL ENCOUNTER: ICD-10-CM

## 2019-12-09 DIAGNOSIS — M47.816 LUMBAR FACET ARTHROPATHY: ICD-10-CM

## 2019-12-09 DIAGNOSIS — M51.36 DISC DEGENERATION, LUMBAR: ICD-10-CM

## 2019-12-09 PROCEDURE — 3008F PR BODY MASS INDEX (BMI) DOCUMENTED: ICD-10-PCS | Mod: S$GLB,,, | Performed by: ORTHOPAEDIC SURGERY

## 2019-12-09 PROCEDURE — 3008F BODY MASS INDEX DOCD: CPT | Mod: S$GLB,,, | Performed by: ORTHOPAEDIC SURGERY

## 2019-12-09 PROCEDURE — 99213 OFFICE O/P EST LOW 20 MIN: CPT | Mod: S$GLB,,, | Performed by: ORTHOPAEDIC SURGERY

## 2019-12-09 PROCEDURE — 99213 PR OFFICE/OUTPT VISIT, EST, LEVL III, 20-29 MIN: ICD-10-PCS | Mod: S$GLB,,, | Performed by: ORTHOPAEDIC SURGERY

## 2019-12-09 RX ORDER — ORPHENADRINE CITRATE 100 MG/1
100 TABLET, EXTENDED RELEASE ORAL 2 TIMES DAILY
Qty: 60 TABLET | Refills: 2 | Status: SHIPPED | OUTPATIENT
Start: 2019-12-09 | End: 2020-01-08

## 2019-12-09 NOTE — PROGRESS NOTES
Subjective:       Patient ID: Leandra Lopes is a 54 y.o. female.    Chief Complaint: Pain of the Lumbar Spine (She did see Dr Meek for MBB on right side and that helped 11-26. She had a child fall on her 11-7 on left side lumbar which is still hurting even though she is still in P.T and injection given here 11-8 lasted a day)      History of Present Illness     Patient is here today for evaluation of thoracolumbar pain left greater than right that started yesterday.  This is a work injury.  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  The pain started immediately.  No other symptoms associated with this injury at least at this time. These symptoms were not present prior to the injury that occurred yesterday.     Patient also has a past medical history of right-sided thoracolumbar pain and bilateral lumbar and lumbosacral pain with right lower extremity radiculitis and dysesthesia all the way to the heel and foot. This is chronic and has been present for multiple years.  This pain is unchanged from the injury/incident that occurred yesterday.  She was recently seen by us for her chronic symptomatology.  She failed physical therapy, we ordered an updated lumbar MRI and reviewed with her, and most recently referred her to Pain Management for lumbar medial branch blocks with progression towards a rhizotomy.  She has been evaluated by a pain management specialist and is had lumbar medial branch blocks  At the time of her last evaluation we were positive that her left thoracolumbar pain is specifically related are caused by the work injury that occurred yesterday.  These symptoms were not present prior to this work injury despite the fact that she has some pre-existing degenerative disc disease. Therefore chronologically we feel confident in saying that her current left thoracolumbar pain is work related.  We recommend a formal course of structured physical  therapy for this specific pain related to this specific work injury.  Also she was given a left thoracolumbar trigger point injection with 1 cc of lidocaine and 40 mg of Depo-Medrol.  We changed her medications around some.  Norflex 100 mg b.i.d. and tramadol 50 mg up to 4 times a day as needed for pain.  Current Medications  Current Outpatient Medications   Medication Sig Dispense Refill    docusate sodium (COLACE) 100 MG capsule docusate sodium 100 mg capsule   TAKE 1 CAPSULE EVERY 12 HOURS      gabapentin (NEURONTIN) 300 MG capsule 1st week: take one tablet QHS. 2nd week: take one tablet BID. 3rd week: take one tablet TID 90 capsule 11    glucosam-chond-hyalu-CF borate (MOVE FREE Vonage) 750 mg-100 mg- 1.65 mg-108 mg Tab Take 1 tablet by mouth once daily.      multivitamin capsule Take 1 capsule by mouth once daily.      wheat dextrin (BENEFIBER CLEAR SF, DEXTRIN,) 3 gram/3.5 gram PwPk Take by mouth.      cetirizine (ZYRTEC) 10 MG tablet Take 1 tablet (10 mg total) by mouth once daily. 30 tablet 0    orphenadrine (NORFLEX) 100 mg tablet Take 1 tablet (100 mg total) by mouth 2 (two) times daily. 60 tablet 2     No current facility-administered medications for this visit.        Allergies  Review of patient's allergies indicates:   Allergen Reactions    Morphine Shortness Of Breath    Adhesive Rash     AGENT: Patient thinks it was PAPER TAPE    Aspirin Other (See Comments)     Stomach pain    Penicillin g Other (See Comments)     Stomach pain    Morphine sulfate        Past Medical History  Past Medical History:   Diagnosis Date    Breast CA     Breast cancer 8/2013    left breast infiltrating ductal carcinoma, ER/TN positive, Her2 negative    Difficult intravenous access     patient is a hard iv stick    Needle phobia     SADIE (obstructive sleep apnea)     on cpap       Surgical History  Past Surgical History:   Procedure Laterality Date    BREAST BIOPSY  8/2013    left breast- IDC     BREAST SURGERY  10//    bilateral mastectomy and reconstruction-no implants     SECTION      x3    CHOLECYSTECTOMY      COLON SURGERY  2009    benign neoplasm recheck 2014, 12 inches    EPIDURAL STEROID INJECTION INTO LUMBAR SPINE N/A 2019    Procedure: Injection-steroid-epidural-lumbar;  Surgeon: Niko Meke MD;  Location: [facility];  Service: Pain Management;  Laterality: N/A;  L2-L3    HYSTERECTOMY      TVH/BSO, benign    TONSILLECTOMY      TUBAL LIGATION         Family History:   Family History   Problem Relation Age of Onset    Cancer Mother         liver, bone, breast    Hypertension Mother     Heart disease Mother     Breast cancer Mother 63        initial dx ILC at 63 with recurrence at 67, 2nd CA IDC at 67    Cancer Father 62        bone ? breast    Diabetes Father     Breast cancer Maternal Aunt 50    Ovarian cancer Cousin 49        daughter of M Aunt with Breast CA       Social History:   Social History     Socioeconomic History    Marital status:      Spouse name: Not on file    Number of children: Not on file    Years of education: Not on file    Highest education level: Not on file   Occupational History    Not on file   Social Needs    Financial resource strain: Not hard at all    Food insecurity:     Worry: Never true     Inability: Never true    Transportation needs:     Medical: No     Non-medical: No   Tobacco Use    Smoking status: Never Smoker    Smokeless tobacco: Never Used   Substance and Sexual Activity    Alcohol use: Yes     Frequency: Monthly or less     Drinks per session: 1 or 2     Binge frequency: Never     Comment: rarely    Drug use: No    Sexual activity: Yes     Partners: Male     Birth control/protection: Surgical   Lifestyle    Physical activity:     Days per week: 2 days     Minutes per session: 10 min    Stress: To some extent   Relationships    Social connections:     Talks on phone: Three times a week     Gets  together: Three times a week     Attends Jain service: Not on file     Active member of club or organization: No     Attends meetings of clubs or organizations: Never     Relationship status:    Other Topics Concern    Not on file   Social History Narrative    Not on file       Hospitalization/Major Diagnostic Procedure:     Review of Systems     General/Constitutional:  Chills denies. Fatigue denies. Fever denies. Weight gain denies. Weight loss denies.    Respiratory:  Shortness of breath denies.    Cardiovascular:  Chest pain denies.    Gastrointestinal:  Constipation denies. Diarrhea denies. Nausea denies. Vomiting denies.     Hematology:  Easy bruising denies. Prolonged bleeding denies.     Genitourinary:  Frequent urination denies. Pain in lower back denies. Painful urination denies.     Musculoskeletal:  See HPI for details    Skin:  Rash denies.    Neurologic:  Dizziness denies. Gait abnormalities denies. Seizures denies. Tingling/Numbess denies.    Psychiatric:  Anxiety denies. Depressed mood denies.     Objective:   Vital Signs:   Vitals:    12/09/19 1534   BP: 130/80        Physical Exam      General Examination:     Constitutional: The patient is alert and oriented to lace person and time. Mood is pleasant.     Head/Face: Normal facial features normal eyebrows    Eyes: Normal extraocular motion bilaterally    Lungs: Respirations are equal and unlabored    Gait is coordinated.    Cardiovascular: There are no swelling or varicosities present.    Lymphatic: Negative for adenopathy    Skin: Normal    Neurological: Level of consciousness normal. Oriented to place person and time and situation    Psychiatric: Oriented to time place person and situation    Mild tenderness left lower ribcage and the left paraspinous muscle at thoracolumbar junction.  Mild pain with bending of the trunk to the left side. No spasm.  XRAY Report/ Interpretation:        Assessment:       1. Acute left-sided thoracic  back pain    2. Strain of lumbar region, initial encounter    3. Thoracic myofascial strain, initial encounter    4. Lumbar facet arthropathy    5. Disc degeneration, lumbar    6. Lumbar pain    7. Acute right-sided thoracic back pain    8. Spinal enthesopathy of lumbar region        Plan:       Leandra was seen today for pain.    Diagnoses and all orders for this visit:    Acute left-sided thoracic back pain  -     orphenadrine (NORFLEX) 100 mg tablet; Take 1 tablet (100 mg total) by mouth 2 (two) times daily.    Strain of lumbar region, initial encounter  -     orphenadrine (NORFLEX) 100 mg tablet; Take 1 tablet (100 mg total) by mouth 2 (two) times daily.    Thoracic myofascial strain, initial encounter  -     orphenadrine (NORFLEX) 100 mg tablet; Take 1 tablet (100 mg total) by mouth 2 (two) times daily.    Lumbar facet arthropathy    Disc degeneration, lumbar    Lumbar pain  -     orphenadrine (NORFLEX) 100 mg tablet; Take 1 tablet (100 mg total) by mouth 2 (two) times daily.    Acute right-sided thoracic back pain  -     orphenadrine (NORFLEX) 100 mg tablet; Take 1 tablet (100 mg total) by mouth 2 (two) times daily.    Spinal enthesopathy of lumbar region  -     orphenadrine (NORFLEX) 100 mg tablet; Take 1 tablet (100 mg total) by mouth 2 (two) times daily.         Follow up in about 5 weeks (around 1/13/2020) for lumbar f/u.    I believe this patient has left-sided acute thoracolumbar pain caused by her on-the-job injury her right-sided symptoms with unrelated to her job and this is a separate problem.  I advised she continue with a course of physical therapy and continue present medications    This note was created using Dragon voice recognition software that occasionally misinterpreted phrases or words.

## 2019-12-18 ENCOUNTER — CLINICAL SUPPORT (OUTPATIENT)
Dept: URGENT CARE | Facility: CLINIC | Age: 54
End: 2019-12-18
Payer: COMMERCIAL

## 2019-12-18 VITALS
OXYGEN SATURATION: 98 % | HEART RATE: 86 BPM | HEIGHT: 67 IN | RESPIRATION RATE: 14 BRPM | SYSTOLIC BLOOD PRESSURE: 118 MMHG | DIASTOLIC BLOOD PRESSURE: 82 MMHG | WEIGHT: 277 LBS | BODY MASS INDEX: 43.47 KG/M2 | TEMPERATURE: 98 F

## 2019-12-18 DIAGNOSIS — J01.90 ACUTE NON-RECURRENT SINUSITIS, UNSPECIFIED LOCATION: Primary | ICD-10-CM

## 2019-12-18 DIAGNOSIS — R09.82 POST-NASAL DRIP: ICD-10-CM

## 2019-12-18 PROCEDURE — 96372 THER/PROPH/DIAG INJ SC/IM: CPT | Mod: S$GLB,,, | Performed by: NURSE PRACTITIONER

## 2019-12-18 PROCEDURE — 96372 PR INJECTION,THERAP/PROPH/DIAG2ST, IM OR SUBCUT: ICD-10-PCS | Mod: S$GLB,,, | Performed by: NURSE PRACTITIONER

## 2019-12-18 PROCEDURE — 99214 OFFICE O/P EST MOD 30 MIN: CPT | Mod: 25,S$GLB,, | Performed by: NURSE PRACTITIONER

## 2019-12-18 PROCEDURE — 99214 PR OFFICE/OUTPT VISIT, EST, LEVL IV, 30-39 MIN: ICD-10-PCS | Mod: 25,S$GLB,, | Performed by: NURSE PRACTITIONER

## 2019-12-18 RX ORDER — DEXAMETHASONE SODIUM PHOSPHATE 4 MG/ML
8 INJECTION, SOLUTION INTRA-ARTICULAR; INTRALESIONAL; INTRAMUSCULAR; INTRAVENOUS; SOFT TISSUE
Status: COMPLETED | OUTPATIENT
Start: 2019-12-18 | End: 2019-12-18

## 2019-12-18 RX ORDER — FLUTICASONE PROPIONATE 50 MCG
2 SPRAY, SUSPENSION (ML) NASAL 2 TIMES DAILY
Qty: 1 BOTTLE | Refills: 0 | Status: SHIPPED | OUTPATIENT
Start: 2019-12-18 | End: 2020-02-24

## 2019-12-18 RX ORDER — PREDNISONE 20 MG/1
20 TABLET ORAL 2 TIMES DAILY
Qty: 10 TABLET | Refills: 0 | Status: SHIPPED | OUTPATIENT
Start: 2019-12-18 | End: 2019-12-23

## 2019-12-18 RX ORDER — CETIRIZINE HYDROCHLORIDE 10 MG/1
10 TABLET ORAL DAILY
Qty: 30 TABLET | Refills: 2 | Status: SHIPPED | OUTPATIENT
Start: 2019-12-18 | End: 2021-02-22 | Stop reason: SDUPTHER

## 2019-12-18 RX ADMIN — DEXAMETHASONE SODIUM PHOSPHATE 8 MG: 4 INJECTION, SOLUTION INTRA-ARTICULAR; INTRALESIONAL; INTRAMUSCULAR; INTRAVENOUS; SOFT TISSUE at 10:12

## 2019-12-18 NOTE — PATIENT INSTRUCTIONS
Symptomatic treatment:    Alternate Tylenol and Ibuprofen every 3 hrs for fever, pain and inflammation. Avoid Nsaids if you are pregnant or have advanced kidney disease.     salt water gargles to soothe throat from post nasal drip  Honey/lemon water or warm tea to soothe throat from post nasal drip  Cepachol helps to soothe the discomfort in throat from post nasal drip    Cold-eeze helps to reduce the duration of URI symptoms if taken early  Elderberry to reduce duration of viral URI symptoms    Nasal saline spray reduces inflammation and dryness  Warm face compresses/hot showers as often as you can to open sinuses and allow to drain.   Flonase OTC or Nasacort OTC to help decrease inflammation in nasal turbinates and allow sinuses to drain    Vicks vapor rub at night  Simple foods like chicken noodle soup help provide hydration and nutrition    Delsym helps with coughing at night    Zantac will help if there is reflux from the post nasal drip and helpful to take at night    Zyrtec/Claritin during the day and Benadryl at night may help if allergy component concurrently with URI    Rest as much as you can    Your symptoms will likely last 5-7 days, maybe longer depending on how it affects your body.  You are contagious 5-7, so minimize contact with others to reduce the spread to others and stay home from work or school as we discussed. Dehydration is preventable but is one of the main reasons why you will feel so badly. Drink pedialyte, gatorade or propel. Stay hydrated.  Antibiotics are not needed unless a complication(such as Otitis Media, Bacterial sinus infection or pneumonia) develops. Taking antibiotics for Flu/Cold is not supported by evidence-based medicine and can expose you to unnecessary side effects of the medication, such as anaphylaxis, yeast infection and leads to antibiotic resistance.   If you experience any:  Chest pain, shortness of breath, wheezing or difficulty breathing,  Severe headache, face,  neck or ear pain,  New rash,  Fever over 101.5º F (38.6 C) for more than three days,  Confusion, behavior change or seizure,  Severe weakness or dizziness, please go to the ER immediately for further testing.           POST NASAL DRIP  Postnasal drip is a condition that causes a large amount of mucus to collect in your throat or nose. It may also be called upper airway cough syndrome because the mucus causes repeated coughing. You may have a sore throat, or throat tissues may swell. This may feel like a lump in your throat. You may also feel like you need to clear your throat often.    Manage postnasal drip:  Use a humidifier or vaporizer. Use a cool mist humidifier or a vaporizer to increase air moisture in your home. This may make it easier for you to breathe.  Drink more liquids as directed. Liquids help keep your air passages moist and help you cough up mucus. Ask how much liquid to drink each day and which liquids are best for you.  Sleep on propped up pillows, to keep the mucus from collecting at the back of your throat  Nasal irrigation (available over-the-counter)  Avoid cold air and dry, heated air. Cold or dry air can trigger postnasal drip. Try to stay inside on cold days, or keep your mouth covered. Do not stay long in areas that have dry, heated air.  Do not smoke, and avoid secondhand smoke. Nicotine and other chemicals in cigarettes and cigars can irritate your throat and make coughing worse. Ask your healthcare provider for information if you currently smoke and need help to quit. E-cigarettes or smokeless tobacco still contain nicotine. Talk to your healthcare provider before you use these products.    Medications  Medicines may be given to thin the mucus. You may need to swallow the medicine or use a device to flush your sinuses with liquid squirted into your nose. Nasal sprays may also be needed to keep the tissues in your nose moist. Medicines can also relieve congestion. Allergy medicine may  "help if your symptoms are caused by seasonal allergies, such as hay fever. You may need medicine to help control GERD.  Take your medicine as directed. Contact your healthcare provider if you think your medicine is not helping or if you have side effects. Tell him or her if you are allergic to any medicine. Keep a list of the medicines, vitamins, and herbs you take. Include the amounts, and when and why you take them. Bring the list or the pill bottles to follow-up visits. Carry your medicine list with you in case of an emergency.  A oral decongestant, such as pseudoephedrine (as in Sudafed) or phenylephrine (as in Sudafed PE or Mike-Synephrine)  Guaifenesin (as in Mucinex), a medication that can thin the mucus  An anti-histamine, such as  diphenhydramine, as in Benadryl, chlorpheniramine, as in Chlor-Trimeton, loratadine, as in Claritin or Alavert, fexofenadine (Allegra), cetirizine (Zyrtec), levocetirizine (Xyzal), desloratadine (Clarinex)  A nasal decongestant such as oxymetazoline (contained in Afrin) which constricts blood vessels in the nasal passages; this leads to less secretions. Such medications should only be taken for a day or two; longer-term use can cause more harm than good)  Keep in mind that many of these medications are combined in over-the-counter products. For example, there are several formulations of "Sudafed" containing pseudoephedrine or phenylephrine along with additional drugs including acetaminophen, dextromethorphan, and guaifenesin. While these combinations can be effective, it's important to read the label and avoid taking too much of any active ingredient.  What about prescription treatments?  If these approaches aren't effective, prescription treatments may be the next best steps, including:  A nasal steroid spray (such as beclomethasone/Beconase or triamcinolone/Nasacort)  Ipratropium (Atrovent) nasal spray which inhibits secretions (such as mucus)  Other treatments depend on the " cause of the post-nasal drip. Antibiotics are not usually helpful so they aren't usually prescribed for post-nasal drip (unless the symptoms are due to bacterial infection of the sinuses). For allergies, dusting and vacuuming often, covering your mattresses and pillowcases, and special air filter can help reduce exposure to allergy triggers.    What about chicken soup?  If you've been told that chicken soup helps with post-nasal drip (or other symptoms of a cold or flu), it's true! But it doesn't actually have to be chicken soup - any hot liquid can help thin the mucus and help you maintain hydration.    When should I call a doctor?  In most cases, post-nasal drip is annoying but not dangerous. However, you should contact your doctor if you have:  Unexplained fever  Bloody mucus  Wheezing or shortness of breath  Foul smelling drainage  Persistent symptoms despite treatment  The bad news/good news about post nasal drip  Post-nasal drip is among the most common causes of persistent cough, hoarseness, sore throat and other annoying symptoms. It can be caused by a number of conditions and may linger for weeks or months. That's the bad news. The good news is that most of the causes can be quickly identified and most will improve with treatment.    Acute Sinusitis    Acute sinusitis is irritation and swelling of the sinuses. It is usually caused by a viral infection after a common cold. Your doctor can help you find relief.  What is acute sinusitis?  Sinuses are air-filled spaces in the skull behind the face. They are kept moist and clean by a lining of mucosa. Things such as pollen, smoke, and chemical fumes can irritate the mucosa. It can then swell up. As a response to irritation, the mucosa makes more mucus and other fluids. Tiny hairlike cilia cover the mucosa. Cilia help carry mucus toward the opening of the sinus. Too much mucus may cause the cilia to stop working. This blocks the sinus opening. A buildup of fluid  in the sinuses then causes pain and pressure. It can also encourage bacteria to grow in the sinuses.  Common symptoms of acute sinusitis  You may have:  · Facial soreness pain  · Headache  · Fever  · Fluid draining in the back of the throat (postnasal drip)  · Congestion  · Drainage that is thick and colored, instead of clear  · Cough  Diagnosing acute sinusitis  Your doctor will ask about your symptoms and health history. He or she will look at your ear, nose, and throat. You usually won't need to have X-rays taken.    The doctor may take a sample of mucus to check for bacteria. If you have sinusitis that keeps coming back, you may need imaging tests such as X-rays or CAT scans. This will help your doctor check for a structural problem that may be causing the infection.  Treating acute sinusitis  Treatment is aimed at unblocking the sinus opening and helping the cilia work again. You may need to take antihistamine and decongestant medicine. These can reduce inflammation and decrease the amount of fluid your sinuses make. If you have a bacterial infection, you will need to take antibiotic medicine for 10 to 14 days. Take this medicine until it is gone, even if you feel better.  Date Last Reviewed: 10/1/2016  © 2715-2541 [employer]. [address] [place] [address], [place] [address]. All rights reserved. This information is not intended as a substitute for professional medical care. Always follow your healthcare professional's instructions.

## 2019-12-18 NOTE — PROGRESS NOTES
"Subjective:       Patient ID: Leandra Lopes is a 54 y.o. female.    Vitals:  height is 5' 7" (1.702 m) and weight is 125.6 kg (277 lb). Her oral temperature is 97.6 °F (36.4 °C). Her blood pressure is 118/82 and her pulse is 86. Her respiration is 14 and oxygen saturation is 98%.     Chief Complaint: Sore Throat    Sore Throat    This is a new problem. The current episode started yesterday. The problem has been gradually worsening. There has been no fever. Associated symptoms include congestion and coughing. Pertinent negatives include no diarrhea, headaches, shortness of breath or vomiting. Associated symptoms comments: Pharyngitis, PND.       Constitution: Positive for fatigue. Negative for chills and fever.   HENT: Positive for congestion, postnasal drip, sinus pain, sinus pressure and sore throat.    Neck: Negative for painful lymph nodes.   Cardiovascular: Negative for chest pain and leg swelling.   Eyes: Negative for double vision and blurred vision.   Respiratory: Positive for cough. Negative for shortness of breath.    Gastrointestinal: Negative for nausea, vomiting and diarrhea.   Genitourinary: Negative for dysuria, frequency, urgency and history of kidney stones.   Musculoskeletal: Negative for joint pain, joint swelling, muscle cramps and muscle ache.   Skin: Negative for color change, pale, rash and bruising.   Allergic/Immunologic: Negative for seasonal allergies.   Neurological: Negative for dizziness, history of vertigo, light-headedness, passing out and headaches.   Hematologic/Lymphatic: Negative for swollen lymph nodes.   Psychiatric/Behavioral: Negative for nervous/anxious, sleep disturbance and depression. The patient is not nervous/anxious.        Objective:      Physical Exam   Constitutional: She is oriented to person, place, and time. She appears well-developed and well-nourished. She is cooperative.  Non-toxic appearance. She does not appear ill. No distress.   HENT:   Head: Normocephalic " and atraumatic.   Right Ear: Hearing, external ear and ear canal normal. A middle ear effusion is present.   Left Ear: Hearing, external ear and ear canal normal. A middle ear effusion is present.   Nose: Rhinorrhea present. No mucosal edema or nasal deformity. No epistaxis. Right sinus exhibits maxillary sinus tenderness. Right sinus exhibits no frontal sinus tenderness. Left sinus exhibits maxillary sinus tenderness. Left sinus exhibits no frontal sinus tenderness.   Mouth/Throat: Uvula is midline and mucous membranes are normal. No trismus in the jaw. Normal dentition. No uvula swelling. Posterior oropharyngeal erythema and cobblestoning present.   Eyes: Conjunctivae and lids are normal. Right eye exhibits no discharge. Left eye exhibits no discharge. No scleral icterus.   Neck: Trachea normal, normal range of motion, full passive range of motion without pain and phonation normal. Neck supple.   Cardiovascular: Normal rate, regular rhythm, normal heart sounds, intact distal pulses and normal pulses.   Pulmonary/Chest: Effort normal and breath sounds normal. No respiratory distress.   Abdominal: Soft. Normal appearance and bowel sounds are normal. She exhibits no distension, no pulsatile midline mass and no mass. There is no tenderness.   Musculoskeletal: Normal range of motion. She exhibits no edema or deformity.   Neurological: She is alert and oriented to person, place, and time. She exhibits normal muscle tone. Coordination normal.   Skin: Skin is warm, dry, intact, not diaphoretic and not pale.   Psychiatric: She has a normal mood and affect. Her speech is normal and behavior is normal. Judgment and thought content normal. Cognition and memory are normal.   Nursing note and vitals reviewed.        Assessment:       1. Acute non-recurrent sinusitis, unspecified location    2. Post-nasal drip        Plan:     pt has cough medication at home, polytussin. Instructed to take every 4 hours PRN    Acute  non-recurrent sinusitis, unspecified location    Post-nasal drip    Other orders  -     dexamethasone injection 8 mg  -     predniSONE (DELTASONE) 20 MG tablet; Take 1 tablet (20 mg total) by mouth 2 (two) times daily. for 5 days  Dispense: 10 tablet; Refill: 0  -     cetirizine (ZYRTEC) 10 MG tablet; Take 1 tablet (10 mg total) by mouth once daily.  Dispense: 30 tablet; Refill: 2  -     fluticasone propionate (FLONASE) 50 mcg/actuation nasal spray; 2 sprays (100 mcg total) by Each Nostril route 2 (two) times daily.  Dispense: 1 Bottle; Refill: 0

## 2019-12-26 ENCOUNTER — OFFICE VISIT (OUTPATIENT)
Dept: PAIN MEDICINE | Facility: CLINIC | Age: 54
End: 2019-12-26
Payer: COMMERCIAL

## 2019-12-26 VITALS
WEIGHT: 277 LBS | HEIGHT: 67 IN | BODY MASS INDEX: 43.47 KG/M2 | SYSTOLIC BLOOD PRESSURE: 134 MMHG | HEART RATE: 74 BPM | DIASTOLIC BLOOD PRESSURE: 84 MMHG

## 2019-12-26 DIAGNOSIS — M54.16 LUMBAR RADICULOPATHY: ICD-10-CM

## 2019-12-26 DIAGNOSIS — M51.36 DDD (DEGENERATIVE DISC DISEASE), LUMBAR: Primary | ICD-10-CM

## 2019-12-26 DIAGNOSIS — M47.896 OTHER SPONDYLOSIS, LUMBAR REGION: ICD-10-CM

## 2019-12-26 DIAGNOSIS — M79.18 MYOFASCIAL PAIN: ICD-10-CM

## 2019-12-26 PROCEDURE — 99999 PR PBB SHADOW E&M-EST. PATIENT-LVL III: ICD-10-PCS | Mod: PBBFAC,,, | Performed by: PHYSICIAN ASSISTANT

## 2019-12-26 PROCEDURE — 3008F BODY MASS INDEX DOCD: CPT | Mod: CPTII,S$GLB,, | Performed by: PHYSICIAN ASSISTANT

## 2019-12-26 PROCEDURE — 99999 PR PBB SHADOW E&M-EST. PATIENT-LVL III: CPT | Mod: PBBFAC,,, | Performed by: PHYSICIAN ASSISTANT

## 2019-12-26 PROCEDURE — 3008F PR BODY MASS INDEX (BMI) DOCUMENTED: ICD-10-PCS | Mod: CPTII,S$GLB,, | Performed by: PHYSICIAN ASSISTANT

## 2019-12-26 PROCEDURE — 99214 PR OFFICE/OUTPT VISIT, EST, LEVL IV, 30-39 MIN: ICD-10-PCS | Mod: S$GLB,,, | Performed by: PHYSICIAN ASSISTANT

## 2019-12-26 PROCEDURE — 99214 OFFICE O/P EST MOD 30 MIN: CPT | Mod: S$GLB,,, | Performed by: PHYSICIAN ASSISTANT

## 2019-12-26 NOTE — PROGRESS NOTES
Referring Physician: No ref. provider found    PCP: Sil Palumbo MD    CC: back pain    Interval History:  Leandra Lopes is a 54 y.o. female with chronic back pain who presents today for f/u s/p Lumbar NISHA at L2-3. Reports 80% relief. She is very happy with results. She wishes to remain as Dr. Ureña patient. Continues to report difficulty with standing for prolonged periods and bending over. She is benefiting from dry needling. She does have an occasional stabbing pain that resolves after a few minutes. Denies any b/b changes or LE weakness. Pain is rated 6/10  Patient has been seen by Dr. Burrell in the past. She plans to continue to see Dr. Meek. She is a new patient to me.     HPI:   Leandra Lopes is a 54 y.o. female with PMH significant for hx of breast cancer s/p surgical repair, SADIE on CPAP, and hx of partial colectomy presents as a referral for the evaluation of back pain. Back pain began approximately 10 years ago after no inciting incident or trauma. The pain was intermittent at that time and eventually resolved. Pain worsened approximately 3 years ago after no inciting incident or trauma. The patient subsequently participated in PT at that time with improvement of her pain. Pain recurrence 6 months ago after no inciting incident or trauma. This time around, PT did not provide benefit. The patient reports that her pain is similar to the pain she experienced approximately 10 years ago but reports that her pain is more intense at this time. Patient localizes her pain to the area across her lower back. Patient reports of radiation down the posterior aspect of her RLE into her heel. Back pain > RLE pain. Patient describes her pain as a stabbing type of pain. Patient reports that her current pain is a 7-8/10. Patient denies of any numbness or tingling in her lower extremities. Patient denies of any urinary/fecal incontinence, saddle anesthesia, or weakness.     Aggravating factors: prolonged standing;  transitioning from the sitting to standing position, lifting, bending    Mitigating factors: meloxicam; voltaren gel; PT; heating pad    Relevant Surgeries: no    Interventional Therapies: no    : Not applicable    Non-pharmacologic Treatment:     · Physical Therapy: yes; last session was one week ago  · Ice/Heat: yes; heating pad with benefit   · TENS: yes; tens unit with temporary relief  · Massage: no  · Chiropractic care: no  · Acupuncture: no         Pain Medications:         · Currently taking: meloxicam; voltaren gel; OTC tylenol  ·   · Has tried in the past:    · Opioids: no  · NSAIDS: yes  · Tylenol: yes  · Muscle relaxants: no  · TCAs: no  · SNRIs: no  · Anticonvulsants: no  · topical creams: yes    Anticoagulation: no    ROS:  Review of Systems   Constitutional: Negative for chills and fever.   HENT: Negative for sore throat.    Eyes: Negative for visual disturbance.   Respiratory: Negative for shortness of breath.    Cardiovascular: Negative for chest pain.   Gastrointestinal: Negative for nausea and vomiting.   Genitourinary: Negative for difficulty urinating.   Musculoskeletal: Positive for back pain.   Skin: Negative for rash.   Allergic/Immunologic: Negative for immunocompromised state.   Neurological: Negative for syncope.   Hematological: Does not bruise/bleed easily.   Psychiatric/Behavioral: Negative for suicidal ideas.        Past Medical History:   Diagnosis Date    Breast CA     Breast cancer 2013    left breast infiltrating ductal carcinoma, ER/[place] positive, Her2 negative    Difficult intravenous access     patient is a hard iv stick    Needle phobia     SADIE (obstructive sleep apnea)     on cpap     Past Surgical History:   Procedure Laterality Date    BREAST BIOPSY  2013    left breast- IDC    BREAST SURGERY  10//    bilateral mastectomy and reconstruction-no implants     SECTION      x3    CHOLECYSTECTOMY      COLON SURGERY  2009    benign neoplasm recheck  2014, 12 inches    EPIDURAL STEROID INJECTION INTO LUMBAR SPINE N/A 11/26/2019    Procedure: Injection-steroid-epidural-lumbar;  Surgeon: Niko Meek MD;  Location: [facility] OR;  Service: Pain Management;  Laterality: N/A;  L2-L3    HYSTERECTOMY      TVH/BSO, benign    TONSILLECTOMY      TUBAL LIGATION       Family History   Problem Relation Age of Onset    Cancer Mother         liver, bone, breast    Hypertension Mother     Heart disease Mother     Breast cancer Mother 63        initial dx ILC at 63 with recurrence at 67, 2nd CA IDC at 67    Cancer Father 62        bone ? breast    Diabetes Father     Breast cancer Maternal Aunt 50    Ovarian cancer Cousin 49        daughter of M Aunt with Breast CA     Social History     Socioeconomic History    Marital status:      Spouse name: Not on file    Number of children: Not on file    Years of education: Not on file    Highest education level: Not on file   Occupational History    Not on file   Social Needs    Financial resource strain: Not hard at all    Food insecurity:     Worry: Never true     Inability: Never true    Transportation needs:     Medical: No     Non-medical: No   Tobacco Use    Smoking status: Never Smoker    Smokeless tobacco: Never Used   Substance and Sexual Activity    Alcohol use: Yes     Frequency: Monthly or less     Drinks per session: 1 or 2     Binge frequency: Never     Comment: rarely    Drug use: No    Sexual activity: Yes     Partners: Male     Birth control/protection: Surgical   Lifestyle    Physical activity:     Days per week: 2 days     Minutes per session: 10 min    Stress: To some extent   Relationships    Social connections:     Talks on phone: Three times a week     Gets together: Three times a week     Attends Mu-ism service: Not on file     Active member of club or organization: No     Attends meetings of clubs or organizations: Never     Relationship status:    Other Topics Concern     "Not on file   Social History Narrative    Not on file         Allergies: See med card    Vitals:    12/26/19 1047   BP: 134/84   Pulse: 74   Weight: 125.6 kg (277 lb)   Height: 5' 7" (1.702 m)   PainSc:   6   PainLoc: Back         Physical exam:  Physical Exam   Constitutional: She is oriented to person, place, and time and well-developed, well-nourished, and in no distress.   HENT:   Head: Normocephalic and atraumatic.   Eyes: Conjunctivae and EOM are normal. Right eye exhibits no discharge. Left eye exhibits no discharge.   Cardiovascular: Normal rate.   Pulmonary/Chest: Effort normal and breath sounds normal. No respiratory distress.   Abdominal: Soft.   Neurological: She is alert and oriented to person, place, and time.   Skin: Skin is warm and dry. No rash noted. She is not diaphoretic.   Psychiatric: Mood, memory, affect and judgment normal.   Nursing note and vitals reviewed.       UPPER EXTREMITIES: Normal alignment, normal range of motion, no atrophy, no skin changes,  hair growth and nail growth normal and equal bilaterally. No swelling, no tenderness.    LOWER EXTREMITIES:  Normal alignment, normal range of motion, no atrophy, no skin changes,  hair growth and nail growth normal and equal bilaterally. No swelling, no tenderness.    LUMBAR SPINE  Lumbar spine: ROM is limited with flexion extension and oblique extension with increased pain on extension.     ((--)) Supine straight leg raise    ((+)) Facet loading bilaterally (R>L)  Internal and external rotation of the hip causes no increased pain on either side.  Myofascial exam: Tenderness to palpation across lumbar paraspinous muscles.    ((--)) TTP at the SI joint  ((+)) VAL's test on the right  ((--)) One leg stand    ((--)) Distraction test    CRANIAL NERVES:  II:  PERRL bilaterally,   III,IV,VI: EOMI.    V:  Facial sensation equal bilaterally  VII:  Facial motor function normal.  VIII:  Hearing equal to finger rub bilaterally  IX/X: Gag normal, " palate symmetric  XI:  Shoulder shrug equal, head turn equal  XII:  Tongue midline without fasciculations      MOTOR: Tone and bulk: normal bilateral upper and lower Strength: normal   Delt Bi Tri WE WF     R 5 5 5 5 5 5   L 5 5 5 5 5 5     IP ADD ABD Quad TA Gas HAM  R 5 5 5 5 5 5 5  L 5 5 5 5 5 5 5    SENSATION: Light touch and pinprick intact bilaterally  REFLEXES: normal, symmetric, nonbrisk.  Toes down, no clonus. Negative gallagher's sign bilaterally.  GAIT: normal rise, base, steps, and arm swing.        Imaging: MRI Lumbar spine without contrast (10/24/2019):  Mild dextroscoliosis with minimal retrolisthesis of L1 on L2 and L2 on L3    Moderate multilevel degenerative disc disease and facet hypertrophy.    Broad-based disc bulge at L2-3 resulting in moderate central canal stenosis and foraminal narrowing.  Correlation with bilateral L2 radicular symptoms is recommended.  There is a small central extruded disc fragment extending inferiorly posterior to the superior endplate of L3    Moderate right foraminal narrowing at L3-4 and correlation with right L3 radicular symptoms is recommended      Assessment:   Leandra Lopes is a 54 y.o. female with   1. DDD (degenerative disc disease), lumbar     2. Lumbar radiculopathy     3. Other spondylosis, lumbar region     4. Myofascial pain         Plan:  1.  I have stressed the importance of physical activity and a home exercise plan to help with pain and improve overall health.  2. She wishes to discontinue Gabapentin. She currently only takes 300 mg nightly. She will titrate off and restart if pain worsens.    3. Consider repeat L2-3 IESI if pain worsens  4. Continue PT  5. F/u prn

## 2020-01-13 ENCOUNTER — OFFICE VISIT (OUTPATIENT)
Dept: ORTHOPEDICS | Facility: CLINIC | Age: 55
End: 2020-01-13
Payer: COMMERCIAL

## 2020-01-13 VITALS — WEIGHT: 270 LBS | DIASTOLIC BLOOD PRESSURE: 82 MMHG | BODY MASS INDEX: 42.29 KG/M2 | SYSTOLIC BLOOD PRESSURE: 112 MMHG

## 2020-01-13 DIAGNOSIS — M54.6 ACUTE RIGHT-SIDED THORACIC BACK PAIN: Primary | ICD-10-CM

## 2020-01-13 DIAGNOSIS — S39.012A STRAIN OF LUMBAR REGION, INITIAL ENCOUNTER: ICD-10-CM

## 2020-01-13 DIAGNOSIS — S29.019A THORACIC MYOFASCIAL STRAIN, INITIAL ENCOUNTER: ICD-10-CM

## 2020-01-13 DIAGNOSIS — M46.06 SPINAL ENTHESOPATHY OF LUMBAR REGION: ICD-10-CM

## 2020-01-13 DIAGNOSIS — M47.816 LUMBAR FACET ARTHROPATHY: ICD-10-CM

## 2020-01-13 DIAGNOSIS — M51.36 DISC DEGENERATION, LUMBAR: ICD-10-CM

## 2020-01-13 PROCEDURE — 3008F BODY MASS INDEX DOCD: CPT | Mod: S$GLB,,, | Performed by: ORTHOPAEDIC SURGERY

## 2020-01-13 PROCEDURE — 99213 OFFICE O/P EST LOW 20 MIN: CPT | Mod: S$GLB,,, | Performed by: ORTHOPAEDIC SURGERY

## 2020-01-13 PROCEDURE — 99213 PR OFFICE/OUTPT VISIT, EST, LEVL III, 20-29 MIN: ICD-10-PCS | Mod: S$GLB,,, | Performed by: ORTHOPAEDIC SURGERY

## 2020-01-13 PROCEDURE — 3008F PR BODY MASS INDEX (BMI) DOCUMENTED: ICD-10-PCS | Mod: S$GLB,,, | Performed by: ORTHOPAEDIC SURGERY

## 2020-01-13 RX ORDER — ORPHENADRINE CITRATE 100 MG/1
100 TABLET, EXTENDED RELEASE ORAL 2 TIMES DAILY
Qty: 60 TABLET | Refills: 3 | Status: SHIPPED | OUTPATIENT
Start: 2020-01-13 | End: 2020-02-24

## 2020-01-13 RX ORDER — ORPHENADRINE CITRATE 100 MG/1
100 TABLET, EXTENDED RELEASE ORAL 2 TIMES DAILY
COMMUNITY
End: 2020-01-13 | Stop reason: SDUPTHER

## 2020-01-13 NOTE — PROGRESS NOTES
Subjective:       Patient ID: Leandra Lopes is a 54 y.o. female.    Chief Complaint: Pain of the Thoracic Spine (She does have thoracic pain and she thinks the dry needling helps but she still hurts alot) and Pain of the Lumbar Spine (She did see Dr Meek for NISHA in lumbar. It helped some but she still has pain. Pain radiates down right leg to foot. She thinks NISHA is wearing off. She is still in P.T but feels she is struggling.)      History of Present Illness     Patient is here today for evaluation of thoracolumbar pain left greater than right that started yesterday.  This is a work injury.  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  The pain started immediately.  No other symptoms associated with this injury at least at this time. These symptoms were not present prior to the injury that occurred yesterday.     Patient also has a past medical history of right-sided thoracolumbar pain and bilateral lumbar and lumbosacral pain with right lower extremity radiculitis and dysesthesia all the way to the heel and foot. This is chronic and has been present for multiple years.  This pain is unchanged from the injury/incident that occurred yesterday.  She was recently seen by us for her chronic symptomatology.  She failed physical therapy, we ordered an updated lumbar MRI and reviewed with her, and most recently referred her to Pain Management for lumbar medial branch blocks with progression towards a rhizotomy.  She has been evaluated by a pain management specialist and is had lumbar medial branch blocks  At the time of her last evaluation we were positive that her left thoracolumbar pain is specifically related are caused by the work injury that occurred yesterday.  These symptoms were not present prior to this work injury despite the fact that she has some pre-existing degenerative disc disease. Therefore chronologically we feel confident in saying that her  current left thoracolumbar pain is work related.  We recommend a formal course of structured physical therapy for this specific pain related to this specific work injury.  Also she was given a left thoracolumbar trigger point injection with 1 cc of lidocaine and 40 mg of Depo-Medrol.  We changed her medications around some.         I believed this patient has left-sided acute thoracolumbar pain caused by her on-the-job injury her right-sided symptoms with unrelated to her job and this is a separate problem.  I advised she continue with a course of physical therapy and continue present medications  Current Medications  Current Outpatient Medications   Medication Sig Dispense Refill    cetirizine (ZYRTEC) 10 MG tablet Take 1 tablet (10 mg total) by mouth once daily. 30 tablet 2    fluticasone propionate (FLONASE) 50 mcg/actuation nasal spray 2 sprays (100 mcg total) by Each Nostril route 2 (two) times daily. 1 Bottle 0    glucosam-chond-hyalu-CF borate ([employer]) 750 mg-100 mg- 1.65 mg-108 mg Tab Take 1 tablet by mouth once daily.      multivitamin capsule Take 1 capsule by mouth once daily.      orphenadrine (NORFLEX) 100 mg tablet Take 100 mg by mouth 2 (two) times daily.      wheat dextrin (BENEFIBER CLEAR SF, DEXTRIN,) 3 gram/3.5 gram PwPk Take by mouth.       No current facility-administered medications for this visit.        Allergies  Review of patient's allergies indicates:   Allergen Reactions    Morphine Shortness Of Breath    Adhesive Rash     AGENT: Patient thinks it was PAPER TAPE    Aspirin Other (See Comments)     Stomach pain    Penicillin g Other (See Comments)     Stomach pain    Morphine sulfate        Past Medical History  Past Medical History:   Diagnosis Date    Breast CA     Breast cancer 8/2013    left breast infiltrating ductal carcinoma, ER/[place] positive, Her2 negative    Difficult intravenous access     patient is a hard iv stick    Needle phobia     SADIE  (obstructive sleep apnea)     on cpap       Surgical History  Past Surgical History:   Procedure Laterality Date    BREAST BIOPSY  2013    left breast- IDC    BREAST SURGERY  10//    bilateral mastectomy and reconstruction-no implants     SECTION      x3    CHOLECYSTECTOMY      COLON SURGERY  2009    benign neoplasm recheck 2014, 12 inches    EPIDURAL STEROID INJECTION INTO LUMBAR SPINE N/A 2019    Procedure: Injection-steroid-epidural-lumbar;  Surgeon: Niko Meek MD;  Location: [facility] OR;  Service: Pain Management;  Laterality: N/A;  L2-L3    HYSTERECTOMY      TVH/BSO, benign    TONSILLECTOMY      TUBAL LIGATION         Family History:   Family History   Problem Relation Age of Onset    Cancer Mother         liver, bone, breast    Hypertension Mother     Heart disease Mother     Breast cancer Mother 63        initial dx ILC at 63 with recurrence at 67, 2nd CA IDC at 67    Cancer Father 62        bone ? breast    Diabetes Father     Breast cancer Maternal Aunt 50    Ovarian cancer Cousin 49        daughter of M Aunt with Breast CA       Social History:   Social History     Socioeconomic History    Marital status:      Spouse name: Not on file    Number of children: Not on file    Years of education: Not on file    Highest education level: Not on file   Occupational History    Not on file   Social Needs    Financial resource strain: Not hard at all    Food insecurity:     Worry: Never true     Inability: Never true    Transportation needs:     Medical: No     Non-medical: No   Tobacco Use    Smoking status: Never Smoker    Smokeless tobacco: Never Used   Substance and Sexual Activity    Alcohol use: Yes     Frequency: Monthly or less     Drinks per session: 1 or 2     Binge frequency: Never     Comment: rarely    Drug use: No    Sexual activity: Yes     Partners: Male     Birth control/protection: Surgical   Lifestyle    Physical activity:     Days per week:  2 days     Minutes per session: 10 min    Stress: To some extent   Relationships    Social connections:     Talks on phone: Three times a week     Gets together: Three times a week     Attends Anabaptism service: Not on file     Active member of club or organization: No     Attends meetings of clubs or organizations: Never     Relationship status:    Other Topics Concern    Not on file   Social History Narrative    Not on file       Hospitalization/Major Diagnostic Procedure:     Review of Systems     General/Constitutional:  Chills denies. Fatigue denies. Fever denies. Weight gain denies. Weight loss denies.    Respiratory:  Shortness of breath denies.    Cardiovascular:  Chest pain denies.    Gastrointestinal:  Constipation denies. Diarrhea denies. Nausea denies. Vomiting denies.     Hematology:  Easy bruising denies. Prolonged bleeding denies.     Genitourinary:  Frequent urination denies. Pain in lower back denies. Painful urination denies.     Musculoskeletal:  See HPI for details    Skin:  Rash denies.    Neurologic:  Dizziness denies. Gait abnormalities denies. Seizures denies. Tingling/Numbess denies.    Psychiatric:  Anxiety denies. Depressed mood denies.     Objective:   Vital Signs:   Vitals:    01/13/20 1507   BP: 112/82        Physical Exam      General Examination:     Constitutional: The patient is alert and oriented to lace person and time. Mood is pleasant.     Head/Face: Normal facial features normal eyebrows    Eyes: Normal extraocular motion bilaterally    Lungs: Respirations are equal and unlabored    Gait is coordinated.    Cardiovascular: There are no swelling or varicosities present.    Lymphatic: Negative for adenopathy    Skin: Normal    Neurological: Level of consciousness normal. Oriented to place person and time and situation    Psychiatric: Oriented to time place person and situation    Moderate tenderness at thoracolumbar junction left-sided paraspinous muscles tenderness at  the lumbosacral junction also no spasm in thoracic or lumbar areas  XRAY Report/ Interpretation:        Assessment:       1. Acute left-sided thoracic back pain    2. Strain of lumbar region, initial encounter    3. Thoracic myofascial strain, initial encounter    4. Lumbar facet arthropathy    5. Disc degeneration, lumbar    6. Spinal enthesopathy of lumbar region        Plan:       Leandra was seen today for pain and pain.    Diagnoses and all orders for this visit:    Acute left-sided thoracic back pain    Strain of lumbar region, initial encounter    Thoracic myofascial strain, initial encounter    Lumbar facet arthropathy    Disc degeneration, lumbar    Spinal enthesopathy of lumbar region         No follow-ups on file.    Symptoms are slowly improving therefore I advised continued present regimen and anti-inflammatory medicine taken over-the-counter and continue with nor flex if symptoms do not resolve we will order thoracic MRI but defer MRI since patient is slowly improving    This note was created using Dragon voice recognition software that occasionally misinterpreted phrases or words.

## 2020-01-14 ENCOUNTER — TELEPHONE (OUTPATIENT)
Dept: ORTHOPEDICS | Facility: CLINIC | Age: 55
End: 2020-01-14

## 2020-01-14 NOTE — TELEPHONE ENCOUNTER
----- Message from Ana Paula Layton sent at 1/13/2020  4:25 PM CST -----  Contact: patient  She forgot to ask Dr. Edwards if she could get a permanent handicap tag, call her back at [phone].

## 2020-02-24 ENCOUNTER — OFFICE VISIT (OUTPATIENT)
Dept: ORTHOPEDICS | Facility: CLINIC | Age: 55
End: 2020-02-24
Payer: COMMERCIAL

## 2020-02-24 VITALS — DIASTOLIC BLOOD PRESSURE: 74 MMHG | WEIGHT: 270 LBS | BODY MASS INDEX: 42.29 KG/M2 | SYSTOLIC BLOOD PRESSURE: 118 MMHG

## 2020-02-24 DIAGNOSIS — S39.012D STRAIN OF LUMBAR REGION, SUBSEQUENT ENCOUNTER: ICD-10-CM

## 2020-02-24 DIAGNOSIS — M46.06 SPINAL ENTHESOPATHY OF LUMBAR REGION: ICD-10-CM

## 2020-02-24 DIAGNOSIS — M51.36 DISC DEGENERATION, LUMBAR: ICD-10-CM

## 2020-02-24 DIAGNOSIS — M47.816 LUMBAR FACET ARTHROPATHY: Primary | ICD-10-CM

## 2020-02-24 PROCEDURE — 99213 PR OFFICE/OUTPT VISIT, EST, LEVL III, 20-29 MIN: ICD-10-PCS | Mod: S$GLB,,, | Performed by: ORTHOPAEDIC SURGERY

## 2020-02-24 PROCEDURE — 3008F PR BODY MASS INDEX (BMI) DOCUMENTED: ICD-10-PCS | Mod: S$GLB,,, | Performed by: ORTHOPAEDIC SURGERY

## 2020-02-24 PROCEDURE — 99213 OFFICE O/P EST LOW 20 MIN: CPT | Mod: S$GLB,,, | Performed by: ORTHOPAEDIC SURGERY

## 2020-02-24 PROCEDURE — 3008F BODY MASS INDEX DOCD: CPT | Mod: S$GLB,,, | Performed by: ORTHOPAEDIC SURGERY

## 2020-02-24 NOTE — PROGRESS NOTES
Subjective:       Patient ID: Leandra Lopes is a 54 y.o. female.    Chief Complaint: Pain of the Lumbar Spine ( Lumbar is a lot better. Pain radiates down legs off and on to feet. She is in P.T and that's helps alot)      History of Present Illness   Thoracic and lumbar pain is much improved since last visit with physical therapy she still has more lumbar pain and thoracic pain she see some benefit of physical therapy.    Current Medications  Current Outpatient Medications   Medication Sig Dispense Refill    cetirizine (ZYRTEC) 10 MG tablet Take 1 tablet (10 mg total) by mouth once daily. 30 tablet 2    glucosam-chond-hyalu-CF borate ([employer]) 750 mg-100 mg- 1.65 mg-108 mg Tab Take 1 tablet by mouth once daily.      multivitamin capsule Take 1 capsule by mouth once daily.      wheat dextrin (BENEFIBER CLEAR SF, DEXTRIN,) 3 gram/3.5 gram PwPk Take by mouth.       No current facility-administered medications for this visit.        Allergies  Review of patient's allergies indicates:   Allergen Reactions    Morphine Shortness Of Breath    Adhesive Rash     AGENT: Patient thinks it was PAPER TAPE    Aspirin Other (See Comments)     Stomach pain    Penicillin g Other (See Comments)     Stomach pain    Morphine sulfate        Past Medical History  Past Medical History:   Diagnosis Date    Breast CA     Breast cancer 2013    left breast infiltrating ductal carcinoma, ER/MD positive, Her2 negative    Difficult intravenous access     patient is a hard iv stick    Needle phobia     SADIE (obstructive sleep apnea)     on cpap       Surgical History  Past Surgical History:   Procedure Laterality Date    BREAST BIOPSY  2013    left breast- IDC    BREAST SURGERY  10//    bilateral mastectomy and reconstruction-no implants     SECTION      x3    CHOLECYSTECTOMY      COLON SURGERY  2009    benign neoplasm recheck , 12 inches    EPIDURAL STEROID INJECTION INTO LUMBAR SPINE N/A  11/26/2019    Procedure: Injection-steroid-epidural-lumbar;  Surgeon: Niko Meek MD;  Location: [facility] OR;  Service: Pain Management;  Laterality: N/A;  L2-L3    HYSTERECTOMY      TVH/BSO, benign    TONSILLECTOMY      TUBAL LIGATION         Family History:   Family History   Problem Relation Age of Onset    Cancer Mother         liver, bone, breast    Hypertension Mother     Heart disease Mother     Breast cancer Mother 63        initial dx ILC at 63 with recurrence at 67, 2nd CA IDC at 67    Cancer Father 62        bone ? breast    Diabetes Father     Breast cancer Maternal Aunt 50    Ovarian cancer Cousin 49        daughter of M Aunt with Breast CA       Social History:   Social History     Socioeconomic History    Marital status:      Spouse name: Not on file    Number of children: Not on file    Years of education: Not on file    Highest education level: Not on file   Occupational History    Not on file   Social Needs    Financial resource strain: Not hard at all    Food insecurity:     Worry: Never true     Inability: Never true    Transportation needs:     Medical: No     Non-medical: No   Tobacco Use    Smoking status: Never Smoker    Smokeless tobacco: Never Used   Substance and Sexual Activity    Alcohol use: Yes     Frequency: Monthly or less     Drinks per session: 1 or 2     Binge frequency: Never     Comment: rarely    Drug use: No    Sexual activity: Yes     Partners: Male     Birth control/protection: Surgical   Lifestyle    Physical activity:     Days per week: 2 days     Minutes per session: 10 min    Stress: To some extent   Relationships    Social connections:     Talks on phone: Three times a week     Gets together: Three times a week     Attends Yazdanism service: Not on file     Active member of club or organization: No     Attends meetings of clubs or organizations: Never     Relationship status:    Other Topics Concern    Not on file   Social History  Narrative    Not on file       Hospitalization/Major Diagnostic Procedure:     Review of Systems     General/Constitutional:  Chills denies. Fatigue denies. Fever denies. Weight gain denies. Weight loss denies.    Respiratory:  Shortness of breath denies.    Cardiovascular:  Chest pain denies.    Gastrointestinal:  Constipation denies. Diarrhea denies. Nausea denies. Vomiting denies.     Hematology:  Easy bruising denies. Prolonged bleeding denies.     Genitourinary:  Frequent urination denies. Pain in lower back denies. Painful urination denies.     Musculoskeletal:  See HPI for details    Skin:  Rash denies.    Neurologic:  Dizziness denies. Gait abnormalities denies. Seizures denies. Tingling/Numbess denies.    Psychiatric:  Anxiety denies. Depressed mood denies.     Objective:   Vital Signs:   Vitals:    02/24/20 1131   BP: 118/74        Physical Exam      General Examination:     Constitutional: The patient is alert and oriented to lace person and time. Mood is pleasant.     Head/Face: Normal facial features normal eyebrows    Eyes: Normal extraocular motion bilaterally    Lungs: Respirations are equal and unlabored    Gait is coordinated.    Cardiovascular: There are no swelling or varicosities present.    Lymphatic: Negative for adenopathy    Skin: Normal    Neurological: Level of consciousness normal. Oriented to place person and time and situation    Psychiatric: Oriented to time place person and situation    Patient stand erect has some pain with bending moderate tenderness midline lumbosacral junction over both posterior iliac spine. No spasm.  Minimal tenderness thoracic region.  No pain with extension.  XRAY Report/ Interpretation:  None today      Assessment:       1. Lumbar facet arthropathy    2. Disc degeneration, lumbar    3. Spinal enthesopathy of lumbar region    4. Strain of lumbar region, subsequent encounter        Plan:       Leandra was seen today for pain.    Diagnoses and all orders for  this visit:    Lumbar facet arthropathy    Disc degeneration, lumbar    Spinal enthesopathy of lumbar region    Strain of lumbar region, subsequent encounter         Follow up in about 6 weeks (around 4/6/2020).    Advised to continue with outpatient physical therapy patient appears to be improving.  Defer MRI studies at this time.    This note was created using Dragon voice recognition software that occasionally misinterpreted phrases or words.

## 2020-06-17 ENCOUNTER — OFFICE VISIT (OUTPATIENT)
Dept: ORTHOPEDICS | Facility: CLINIC | Age: 55
End: 2020-06-17
Payer: COMMERCIAL

## 2020-06-17 VITALS — BODY MASS INDEX: 43.07 KG/M2 | WEIGHT: 275 LBS | SYSTOLIC BLOOD PRESSURE: 128 MMHG | DIASTOLIC BLOOD PRESSURE: 78 MMHG

## 2020-06-17 DIAGNOSIS — M47.816 LUMBAR FACET ARTHROPATHY: Primary | ICD-10-CM

## 2020-06-17 DIAGNOSIS — S39.012D STRAIN OF LUMBAR REGION, SUBSEQUENT ENCOUNTER: ICD-10-CM

## 2020-06-17 DIAGNOSIS — M41.9 SCOLIOSIS OF LUMBOSACRAL SPINE, UNSPECIFIED SCOLIOSIS TYPE: ICD-10-CM

## 2020-06-17 DIAGNOSIS — M46.06 SPINAL ENTHESOPATHY OF LUMBAR REGION: ICD-10-CM

## 2020-06-17 DIAGNOSIS — M51.36 DISC DEGENERATION, LUMBAR: ICD-10-CM

## 2020-06-17 DIAGNOSIS — M43.10 RETROLISTHESIS OF VERTEBRAE: ICD-10-CM

## 2020-06-17 PROCEDURE — 99213 OFFICE O/P EST LOW 20 MIN: CPT | Mod: S$GLB,,, | Performed by: ORTHOPAEDIC SURGERY

## 2020-06-17 PROCEDURE — 3008F PR BODY MASS INDEX (BMI) DOCUMENTED: ICD-10-PCS | Mod: S$GLB,,, | Performed by: ORTHOPAEDIC SURGERY

## 2020-06-17 PROCEDURE — 99213 PR OFFICE/OUTPT VISIT, EST, LEVL III, 20-29 MIN: ICD-10-PCS | Mod: S$GLB,,, | Performed by: ORTHOPAEDIC SURGERY

## 2020-06-17 PROCEDURE — 3008F BODY MASS INDEX DOCD: CPT | Mod: S$GLB,,, | Performed by: ORTHOPAEDIC SURGERY

## 2020-06-17 RX ORDER — NAPROXEN SODIUM 220 MG
220 TABLET ORAL
COMMUNITY
End: 2021-04-08

## 2020-06-17 NOTE — PROGRESS NOTES
Subjective:       Patient ID: Leandra Lopes is a 54 y.o. female.    Chief Complaint: Pain of the Lumbar Spine (Lumbar is the same. Pain radiates down both legs off and on to the foot. Right is worse. She has not been to P.T since Covid)      History of Present Illness  Follow-up appointment for back pain.  She has noted that a couple of Aleve basically keep her pain at a tolerable level    Current Medications  Current Outpatient Medications   Medication Sig Dispense Refill    cetirizine (ZYRTEC) 10 MG tablet Take 1 tablet (10 mg total) by mouth once daily. 30 tablet 2    glucosam-chond-hyalu-CF borate (MOVE FREE readeo) 750 mg-100 mg- 1.65 mg-108 mg Tab Take 1 tablet by mouth once daily.      multivitamin capsule Take 1 capsule by mouth once daily.      naproxen sodium (ANAPROX) 220 MG tablet Take 220 mg by mouth every 12 (twelve) hours.      wheat dextrin (BENEFIBER CLEAR SF, DEXTRIN,) 3 gram/3.5 gram PwPk Take by mouth.       No current facility-administered medications for this visit.        Allergies  Review of patient's allergies indicates:   Allergen Reactions    Morphine Shortness Of Breath    Adhesive Rash     AGENT: Patient thinks it was PAPER TAPE    Aspirin Other (See Comments)     Stomach pain    Penicillin g Other (See Comments)     Stomach pain    Morphine sulfate        Past Medical History  Past Medical History:   Diagnosis Date    Breast CA     Breast cancer 2013    left breast infiltrating ductal carcinoma, ER/MS positive, Her2 negative    Difficult intravenous access     patient is a hard iv stick    Needle phobia     SADIE (obstructive sleep apnea)     on cpap       Surgical History  Past Surgical History:   Procedure Laterality Date    BREAST BIOPSY  2013    left breast- IDC    BREAST SURGERY  10//    bilateral mastectomy and reconstruction-no implants     SECTION      x3    CHOLECYSTECTOMY      COLON SURGERY  2009    benign neoplasm recheck   inches    EPIDURAL STEROID INJECTION INTO LUMBAR SPINE N/A 11/26/2019    Procedure: Injection-steroid-epidural-lumbar;  Surgeon: Niko Meek MD;  Location: [facility] OR;  Service: Pain Management;  Laterality: N/A;  L2-L3    HYSTERECTOMY      TVH/BSO, benign    TONSILLECTOMY      TUBAL LIGATION         Family History:   Family History   Problem Relation Age of Onset    Cancer Mother         liver, bone, breast    Hypertension Mother     Heart disease Mother     Breast cancer Mother 63        initial dx ILC at 63 with recurrence at 67, 2nd CA IDC at 67    Cancer Father 62        bone ? breast    Diabetes Father     Breast cancer Maternal Aunt 50    Ovarian cancer Cousin 49        daughter of M Aunt with Breast CA       Social History:   Social History     Socioeconomic History    Marital status:      Spouse name: Not on file    Number of children: Not on file    Years of education: Not on file    Highest education level: Not on file   Occupational History    Not on file   Social Needs    Financial resource strain: Not hard at all    Food insecurity     Worry: Never true     Inability: Never true    Transportation needs     Medical: No     Non-medical: No   Tobacco Use    Smoking status: Never Smoker    Smokeless tobacco: Never Used   Substance and Sexual Activity    Alcohol use: Yes     Frequency: Monthly or less     Drinks per session: 1 or 2     Binge frequency: Never     Comment: rarely    Drug use: No    Sexual activity: Yes     Partners: Male     Birth control/protection: Surgical   Lifestyle    Physical activity     Days per week: 2 days     Minutes per session: 10 min    Stress: To some extent   Relationships    Social connections     Talks on phone: Three times a week     Gets together: Three times a week     Attends Restorationism service: Not on file     Active member of club or organization: No     Attends meetings of clubs or organizations: Never     Relationship status:     Other Topics Concern    Not on file   Social History Narrative    Not on file       Hospitalization/Major Diagnostic Procedure:     Review of Systems     General/Constitutional:  Chills denies. Fatigue denies. Fever denies. Weight gain denies. Weight loss denies.    Respiratory:  Shortness of breath denies.    Cardiovascular:  Chest pain denies.    Gastrointestinal:  Constipation denies. Diarrhea denies. Nausea denies. Vomiting denies.     Hematology:  Easy bruising denies. Prolonged bleeding denies.     Genitourinary:  Frequent urination denies. Pain in lower back denies. Painful urination denies.     Musculoskeletal:  See HPI for details    Skin:  Rash denies.    Neurologic:  Dizziness denies. Gait abnormalities denies. Seizures denies. Tingling/Numbess denies.    Psychiatric:  Anxiety denies. Depressed mood denies.     Objective:   Vital Signs:   Vitals:    06/17/20 0926   BP: 128/78        Physical Exam      General Examination:     Constitutional: The patient is alert and oriented to lace person and time. Mood is pleasant.     Head/Face: Normal facial features normal eyebrows    Eyes: Normal extraocular motion bilaterally    Lungs: Respirations are equal and unlabored    Gait is coordinated.    Cardiovascular: There are no swelling or varicosities present.    Lymphatic: Negative for adenopathy    Skin: Normal    Neurological: Level of consciousness normal. Oriented to place person and time and situation    Psychiatric: Oriented to time place person and situation    Patient stand erect has some mild pain with extension and bending on mid lumbar area no spasm noted straight leg raising maneuver is negative  XRAY Report/ Interpretation:  None today      Assessment:       1. Lumbar facet arthropathy    2. Disc degeneration, lumbar    3. Spinal enthesopathy of lumbar region    4. Strain of lumbar region, subsequent encounter    5. Scoliosis of lumbosacral spine, unspecified scoliosis type    6. Retrolisthesis of  vertebrae        Plan:       Leandra was seen today for pain.    Diagnoses and all orders for this visit:    Lumbar facet arthropathy    Disc degeneration, lumbar    Spinal enthesopathy of lumbar region    Strain of lumbar region, subsequent encounter    Scoliosis of lumbosacral spine, unspecified scoliosis type    Retrolisthesis of vertebrae         No follow-ups on file.    Symptoms are controlled with conservative measures including over-the-counter anti-inflammatory medicines activity as tolerated return as needed    This note was created using Dragon voice recognition software that occasionally misinterpreted phrases or words.

## 2020-08-17 ENCOUNTER — OFFICE VISIT (OUTPATIENT)
Dept: ORTHOPEDICS | Facility: CLINIC | Age: 55
End: 2020-08-17
Payer: COMMERCIAL

## 2020-08-17 VITALS — SYSTOLIC BLOOD PRESSURE: 107 MMHG | DIASTOLIC BLOOD PRESSURE: 80 MMHG | BODY MASS INDEX: 43.07 KG/M2 | WEIGHT: 275 LBS

## 2020-08-17 DIAGNOSIS — M51.36 DISC DEGENERATION, LUMBAR: ICD-10-CM

## 2020-08-17 DIAGNOSIS — M51.34 DEGENERATIVE DISC DISEASE, THORACIC: ICD-10-CM

## 2020-08-17 DIAGNOSIS — M47.816 LUMBAR FACET ARTHROPATHY: ICD-10-CM

## 2020-08-17 DIAGNOSIS — M41.24 OTHER IDIOPATHIC SCOLIOSIS, THORACIC REGION: ICD-10-CM

## 2020-08-17 DIAGNOSIS — M54.6 CHRONIC LEFT-SIDED THORACIC BACK PAIN: ICD-10-CM

## 2020-08-17 DIAGNOSIS — M46.06 SPINAL ENTHESOPATHY, LUMBAR REGION: Primary | ICD-10-CM

## 2020-08-17 DIAGNOSIS — M41.26 OTHER IDIOPATHIC SCOLIOSIS, LUMBAR REGION: ICD-10-CM

## 2020-08-17 DIAGNOSIS — G89.29 CHRONIC LEFT-SIDED THORACIC BACK PAIN: ICD-10-CM

## 2020-08-17 DIAGNOSIS — M47.816 ARTHROPATHY OF LUMBAR FACET JOINT: ICD-10-CM

## 2020-08-17 PROCEDURE — 99213 OFFICE O/P EST LOW 20 MIN: CPT | Mod: 25,S$GLB,, | Performed by: ORTHOPAEDIC SURGERY

## 2020-08-17 PROCEDURE — 96372 THER/PROPH/DIAG INJ SC/IM: CPT | Mod: S$GLB,,, | Performed by: ORTHOPAEDIC SURGERY

## 2020-08-17 PROCEDURE — 96372 IM INJECTION: ICD-10-PCS | Mod: S$GLB,,, | Performed by: ORTHOPAEDIC SURGERY

## 2020-08-17 PROCEDURE — 99213 PR OFFICE/OUTPT VISIT, EST, LEVL III, 20-29 MIN: ICD-10-PCS | Mod: 25,S$GLB,, | Performed by: ORTHOPAEDIC SURGERY

## 2020-08-17 PROCEDURE — 3008F PR BODY MASS INDEX (BMI) DOCUMENTED: ICD-10-PCS | Mod: S$GLB,,, | Performed by: ORTHOPAEDIC SURGERY

## 2020-08-17 PROCEDURE — 3008F BODY MASS INDEX DOCD: CPT | Mod: S$GLB,,, | Performed by: ORTHOPAEDIC SURGERY

## 2020-08-17 RX ORDER — METHYLPREDNISOLONE ACETATE 40 MG/ML
40 INJECTION, SUSPENSION INTRA-ARTICULAR; INTRALESIONAL; INTRAMUSCULAR; SOFT TISSUE
Status: DISCONTINUED | OUTPATIENT
Start: 2020-08-17 | End: 2020-08-17 | Stop reason: HOSPADM

## 2020-08-17 RX ORDER — DIAZEPAM 5 MG/1
5 TABLET ORAL ONCE AS NEEDED
Qty: 2 TABLET | Refills: 0 | Status: SHIPPED | OUTPATIENT
Start: 2020-08-17 | End: 2021-04-08

## 2020-08-17 RX ORDER — IBUPROFEN AND FAMOTIDINE 800; 26.6 MG/1; MG/1
1 TABLET, COATED ORAL 3 TIMES DAILY
Qty: 90 TABLET | Refills: 4 | Status: SHIPPED | OUTPATIENT
Start: 2020-08-17 | End: 2020-09-16

## 2020-08-17 RX ORDER — ORPHENADRINE CITRATE 100 MG/1
100 TABLET, EXTENDED RELEASE ORAL 2 TIMES DAILY
Qty: 20 TABLET | Refills: 0 | Status: SHIPPED | OUTPATIENT
Start: 2020-08-17 | End: 2020-08-27

## 2020-08-17 RX ORDER — MELOXICAM 15 MG/1
15 TABLET ORAL DAILY
COMMUNITY
End: 2020-08-31

## 2020-08-17 RX ORDER — HYDROCODONE BITARTRATE AND ACETAMINOPHEN 5; 325 MG/1; MG/1
1 TABLET ORAL EVERY 8 HOURS PRN
Qty: 21 TABLET | Refills: 0 | Status: SHIPPED | OUTPATIENT
Start: 2020-08-17 | End: 2020-08-24

## 2020-08-17 RX ADMIN — METHYLPREDNISOLONE ACETATE 40 MG: 40 INJECTION, SUSPENSION INTRA-ARTICULAR; INTRALESIONAL; INTRAMUSCULAR; SOFT TISSUE at 02:08

## 2020-08-17 NOTE — PROCEDURES
IM injection    Date/Time: 8/17/2020 2:30 PM  Performed by: Lauri Edwards MD  Authorized by: Lauri Edwards MD     Consent Done?:  Yes (Verbal)  Timeout: prior to procedure the correct patient, procedure, and site was verified    Indications:  Pain  Site marked: the procedure site was marked    Timeout: prior to procedure the correct patient, procedure, and site was verified    Location: [facility].  Prep: patient was prepped and draped in usual sterile fashion    Medications:  40 mg methylPREDNISolone acetate 40 mg/mL  Patient tolerance:  Patient tolerated the procedure well with no immediate complications

## 2020-08-17 NOTE — PROGRESS NOTES
Subjective:       Patient ID: Leandra Lopes is a 55 y.o. female.    Chief Complaint: Pain of the Lumbar Spine (Lumbar is really hurting a lot. Pain radiates down left leg off and on to foot with tingling. P.T stopped her last week because she was told it is not working) and Pain of the Thoracic Spine (She still also has a lot of thoracic pain)      History of Present Illness  Patient is here today with chief complaint of left-sided thoracic and thoracolumbar pain as well as bilateral lumbar and lumbosacral pain.  The pain has been severe and debilitating over the last few weeks since returning back to work after COVID-19.    She has a past medical history of this exact pain after a work injury.  However worker's Comp declined to cover her symptoms because of an even further past medical history of spine problems.  She did attend physical therapy for an extended period of time which helped.  However since this exacerbation started she has returned to physical therapy and it has not helped.  In fact her physical therapist recommended that she follow up with us because of her symptoms.    She was last seen by us about 2 months ago and her symptoms were minimal.  Most recent MRI was lumbar and was done in October 2019.    Current Medications  Current Outpatient Medications   Medication Sig Dispense Refill    cetirizine (ZYRTEC) 10 MG tablet Take 1 tablet (10 mg total) by mouth once daily. 30 tablet 2    glucosam-chond-hyalu-CF borate (MOVE FREE JOINT [employer]) 750 mg-100 mg- 1.65 mg-108 mg Tab Take 1 tablet by mouth once daily.      meloxicam (MOBIC) 15 MG tablet Take 15 mg by mouth once daily.      multivitamin capsule Take 1 capsule by mouth once daily.      naproxen sodium (ANAPROX) 220 MG tablet Take 220 mg by mouth every 12 (twelve) hours.      wheat dextrin (BENEFIBER CLEAR SF, DEXTRIN,) 3 gram/3.5 gram PwPk Take by mouth.      diazePAM (VALIUM) 5 MG tablet Take 1 tablet (5 mg total) by mouth once as needed  for Anxiety. 2 tablet 0    HYDROcodone-acetaminophen (NORCO) 5-325 mg per tablet Take 1 tablet by mouth every 8 (eight) hours as needed for Pain. 21 tablet 0    orphenadrine (NORFLEX) 100 mg tablet Take 1 tablet (100 mg total) by mouth 2 (two) times daily. for 10 days 20 tablet 0     No current facility-administered medications for this visit.        Allergies  Review of patient's allergies indicates:   Allergen Reactions    Morphine Shortness Of Breath    Adhesive Rash     AGENT: Patient thinks it was PAPER TAPE    Aspirin Other (See Comments)     Stomach pain    Penicillin g Other (See Comments)     Stomach pain    Morphine sulfate        Past Medical History  Past Medical History:   Diagnosis Date    Breast CA     Breast cancer 2013    left breast infiltrating ductal carcinoma, ER/CO positive, Her2 negative    Difficult intravenous access     patient is a hard iv stick    Needle phobia     SADIE (obstructive sleep apnea)     on cpap       Surgical History  Past Surgical History:   Procedure Laterality Date    BREAST BIOPSY  2013    left breast- IDC    BREAST SURGERY  10//    bilateral mastectomy and reconstruction-no implants     SECTION      x3    CHOLECYSTECTOMY      COLON SURGERY  2009    benign neoplasm recheck 2014, 12 inches    EPIDURAL STEROID INJECTION INTO LUMBAR SPINE N/A 2019    Procedure: Injection-steroid-epidural-lumbar;  Surgeon: Niko Meek MD;  Location: [facility];  Service: Pain Management;  Laterality: N/A;  L2-L3    HYSTERECTOMY      TVH/BSO, benign    TONSILLECTOMY      TUBAL LIGATION         Family History:   Family History   Problem Relation Age of Onset    Cancer Mother         liver, bone, breast    Hypertension Mother     Heart disease Mother     Breast cancer Mother 63        initial dx ILC at 63 with recurrence at 67, 2nd CA IDC at 67    Cancer Father 62        bone ? breast    Diabetes Father     Breast cancer Maternal Aunt 50     Ovarian cancer Cousin 49        daughter of M Aunt with Breast CA       Social History:   Social History     Socioeconomic History    Marital status:      Spouse name: Not on file    Number of children: Not on file    Years of education: Not on file    Highest education level: Not on file   Occupational History    Not on file   Social Needs    Financial resource strain: Not hard at all    Food insecurity     Worry: Never true     Inability: Never true    Transportation needs     Medical: No     Non-medical: No   Tobacco Use    Smoking status: Never Smoker    Smokeless tobacco: Never Used   Substance and Sexual Activity    Alcohol use: Yes     Frequency: Monthly or less     Drinks per session: 1 or 2     Binge frequency: Never     Comment: rarely    Drug use: No    Sexual activity: Yes     Partners: Male     Birth control/protection: Surgical   Lifestyle    Physical activity     Days per week: 2 days     Minutes per session: 10 min    Stress: To some extent   Relationships    Social connections     Talks on phone: Three times a week     Gets together: Three times a week     Attends [employer] service: Not on file     Active member of club or organization: No     Attends meetings of clubs or organizations: Never     Relationship status:    Other Topics Concern    Not on file   Social History Narrative    Not on file       Hospitalization/Major Diagnostic Procedure:     Review of Systems     General/Constitutional:  Chills denies. Fatigue denies. Fever denies. Weight gain denies. Weight loss denies.    Respiratory:  Shortness of breath denies.    Cardiovascular:  Chest pain denies.    Gastrointestinal:  Constipation denies. Diarrhea denies. Nausea denies. Vomiting denies.     Hematology:  Easy bruising denies. Prolonged bleeding denies.     Genitourinary:  Frequent urination denies. Pain in lower back denies. Painful urination denies.     Musculoskeletal:  See HPI for details    Skin:   Rash denies.    Neurologic:  Dizziness denies. Gait abnormalities denies. Seizures denies. Tingling/Numbess denies.    Psychiatric:  Anxiety denies. Depressed mood denies.     Objective:   Vital Signs:   Vitals:    08/17/20 1452   BP: 107/80        Physical Exam      General Examination:     Constitutional: The patient is alert and oriented to lace person and time. Mood is pleasant.     Head/Face: Normal facial features normal eyebrows    Eyes: Normal extraocular motion bilaterally    Lungs: Respirations are equal and unlabored    Gait is coordinated.    Cardiovascular: There are no swelling or varicosities present.    Lymphatic: Negative for adenopathy    Skin: Normal    Neurological: Level of consciousness normal. Oriented to place person and time and situation    Psychiatric: Oriented to time place person and situation    Thoracolumbar exam:  Antalgic and guarded gait.  Significantly limited lumbar flexion and painful extension.  Bilateral lower extremities are distal neurovascular intact.  Significant tenderness palpation of the left thoracolumbar spine and bilateral lumbar and lumbosacral spine.  Obvious hyper lordosis is noted.    XRAY Report/ Interpretation:  Lumbar AP and lateral x-rays taken in the office today reviewed the patient demonstrate hyper lordosis or sway back with multilevel degenerative disc disease and facet sclerosis causing varying degrees of spondylolisthesis at multiple levels.  Mild to moderate degenerative scoliosis.    Thoracic AP and lateral x-rays taken in the office today reviewed the patient demonstrate degenerative changes throughout      Assessment:       1. Spinal enthesopathy, lumbar region    2. Degenerative disc disease, thoracic    3. Lumbar facet arthropathy    4. Disc degeneration, lumbar    5. Chronic left-sided thoracic back pain    6. Arthropathy of lumbar facet joint    7. Other idiopathic scoliosis, thoracic region    8. Other idiopathic scoliosis, lumbar region         Plan:       Leandra was seen today for pain and pain.    Diagnoses and all orders for this visit:    Spinal enthesopathy, lumbar region  -     X-Ray Lumbar Spine Ap And Lateral  -     orphenadrine (NORFLEX) 100 mg tablet; Take 1 tablet (100 mg total) by mouth 2 (two) times daily. for 10 days  -     HYDROcodone-acetaminophen (NORCO) 5-325 mg per tablet; Take 1 tablet by mouth every 8 (eight) hours as needed for Pain.  -     diazePAM (VALIUM) 5 MG tablet; Take 1 tablet (5 mg total) by mouth once as needed for Anxiety.  -     IM injection    Degenerative disc disease, thoracic  -     X-Ray Thoracic Spine AP Lateral  -     orphenadrine (NORFLEX) 100 mg tablet; Take 1 tablet (100 mg total) by mouth 2 (two) times daily. for 10 days  -     HYDROcodone-acetaminophen (NORCO) 5-325 mg per tablet; Take 1 tablet by mouth every 8 (eight) hours as needed for Pain.  -     diazePAM (VALIUM) 5 MG tablet; Take 1 tablet (5 mg total) by mouth once as needed for Anxiety.  -     MRI Thoracic Spine Without Contrast; Future    Lumbar facet arthropathy  -     orphenadrine (NORFLEX) 100 mg tablet; Take 1 tablet (100 mg total) by mouth 2 (two) times daily. for 10 days  -     HYDROcodone-acetaminophen (NORCO) 5-325 mg per tablet; Take 1 tablet by mouth every 8 (eight) hours as needed for Pain.  -     diazePAM (VALIUM) 5 MG tablet; Take 1 tablet (5 mg total) by mouth once as needed for Anxiety.  -     MRI Lumbar Spine Without Contrast; Future  -     MRI Thoracic Spine Without Contrast; Future    Disc degeneration, lumbar  -     orphenadrine (NORFLEX) 100 mg tablet; Take 1 tablet (100 mg total) by mouth 2 (two) times daily. for 10 days  -     HYDROcodone-acetaminophen (NORCO) 5-325 mg per tablet; Take 1 tablet by mouth every 8 (eight) hours as needed for Pain.  -     diazePAM (VALIUM) 5 MG tablet; Take 1 tablet (5 mg total) by mouth once as needed for Anxiety.  -     MRI Lumbar Spine Without Contrast; Future  -     MRI Thoracic Spine Without  "Contrast; Future    Chronic left-sided thoracic back pain  -     orphenadrine (NORFLEX) 100 mg tablet; Take 1 tablet (100 mg total) by mouth 2 (two) times daily. for 10 days  -     HYDROcodone-acetaminophen (NORCO) 5-325 mg per tablet; Take 1 tablet by mouth every 8 (eight) hours as needed for Pain.  -     diazePAM (VALIUM) 5 MG tablet; Take 1 tablet (5 mg total) by mouth once as needed for Anxiety.    Arthropathy of lumbar facet joint    Other idiopathic scoliosis, thoracic region    Other idiopathic scoliosis, lumbar region         No follow-ups on file.  Valeriy Bernard, physician's assistant served in the capacity as a "scribe" for this patient encounter  A "face to face" encounter occurred with Dr. Edwards on this date  The treatment plan and medical decision making is outlined below:  At this time recommend an MRI of both the lumbar and thoracic spine both without contrast.  Follow up in 2 weeks review and discuss further treatment options.  She has already been in physical therapy since this exacerbation of pain and that is not helping and in fact is making her pain worse.  - also she was given Depo-Medrol 80 mg IM today, a prescription for Norflex, a prescription for Duexis, and a prescription for Norco.    This note was created using Dragon voice recognition software that occasionally misinterpreted phrases or words.    "

## 2020-08-26 ENCOUNTER — HOSPITAL ENCOUNTER (OUTPATIENT)
Dept: RADIOLOGY | Facility: HOSPITAL | Age: 55
Discharge: HOME | End: 2020-08-26
Attending: PHYSICIAN ASSISTANT
Payer: COMMERCIAL

## 2020-08-26 DIAGNOSIS — M51.36 DISC DEGENERATION, LUMBAR: ICD-10-CM

## 2020-08-26 DIAGNOSIS — M47.816 LUMBAR FACET ARTHROPATHY: ICD-10-CM

## 2020-08-26 DIAGNOSIS — M51.34 DEGENERATIVE DISC DISEASE, THORACIC: ICD-10-CM

## 2020-08-26 PROCEDURE — 72146 MRI CHEST SPINE W/O DYE: CPT | Mod: TC,PO

## 2020-08-26 PROCEDURE — 72148 MRI LUMBAR SPINE W/O DYE: CPT | Mod: TC,PO

## 2020-08-31 ENCOUNTER — OFFICE VISIT (OUTPATIENT)
Dept: ORTHOPEDICS | Facility: CLINIC | Age: 55
End: 2020-08-31
Payer: COMMERCIAL

## 2020-08-31 VITALS — BODY MASS INDEX: 43.07 KG/M2 | WEIGHT: 275 LBS | SYSTOLIC BLOOD PRESSURE: 118 MMHG | DIASTOLIC BLOOD PRESSURE: 74 MMHG

## 2020-08-31 DIAGNOSIS — M51.34 DEGENERATIVE DISC DISEASE, THORACIC: Primary | ICD-10-CM

## 2020-08-31 DIAGNOSIS — M51.36 DISC DEGENERATION, LUMBAR: ICD-10-CM

## 2020-08-31 DIAGNOSIS — M47.816 ARTHROPATHY OF LUMBAR FACET JOINT: ICD-10-CM

## 2020-08-31 DIAGNOSIS — M41.9 SCOLIOSIS OF LUMBOSACRAL SPINE, UNSPECIFIED SCOLIOSIS TYPE: ICD-10-CM

## 2020-08-31 DIAGNOSIS — M47.816 LUMBAR FACET ARTHROPATHY: ICD-10-CM

## 2020-08-31 PROCEDURE — 3008F BODY MASS INDEX DOCD: CPT | Mod: S$GLB,,, | Performed by: PHYSICIAN ASSISTANT

## 2020-08-31 PROCEDURE — 99214 OFFICE O/P EST MOD 30 MIN: CPT | Mod: S$GLB,,, | Performed by: PHYSICIAN ASSISTANT

## 2020-08-31 PROCEDURE — 3008F PR BODY MASS INDEX (BMI) DOCUMENTED: ICD-10-PCS | Mod: S$GLB,,, | Performed by: PHYSICIAN ASSISTANT

## 2020-08-31 PROCEDURE — 99214 PR OFFICE/OUTPT VISIT, EST, LEVL IV, 30-39 MIN: ICD-10-PCS | Mod: S$GLB,,, | Performed by: PHYSICIAN ASSISTANT

## 2020-08-31 RX ORDER — ORPHENADRINE CITRATE 100 MG/1
100 TABLET, EXTENDED RELEASE ORAL 2 TIMES DAILY
COMMUNITY
End: 2021-04-08

## 2020-09-17 ENCOUNTER — PATIENT OUTREACH (OUTPATIENT)
Dept: ADMINISTRATIVE | Facility: OTHER | Age: 55
End: 2020-09-17

## 2020-09-17 NOTE — PROGRESS NOTES
Chart was reviewed for overdue Proactive [facility] Encounters ([facility])  topics  Updates were requested from care everywhere  Health Maintenance has been updated  LIN[place] immunization registry triggered

## 2020-09-21 ENCOUNTER — OFFICE VISIT (OUTPATIENT)
Dept: PAIN MEDICINE | Facility: CLINIC | Age: 55
End: 2020-09-21
Payer: COMMERCIAL

## 2020-09-21 VITALS
DIASTOLIC BLOOD PRESSURE: 78 MMHG | HEART RATE: 73 BPM | BODY MASS INDEX: 43.15 KG/M2 | WEIGHT: 274.94 LBS | SYSTOLIC BLOOD PRESSURE: 115 MMHG | HEIGHT: 67 IN

## 2020-09-21 DIAGNOSIS — M51.36 DDD (DEGENERATIVE DISC DISEASE), LUMBAR: Primary | ICD-10-CM

## 2020-09-21 DIAGNOSIS — M54.16 LUMBAR RADICULOPATHY: Primary | ICD-10-CM

## 2020-09-21 DIAGNOSIS — Z01.818 PRE-OP TESTING: ICD-10-CM

## 2020-09-21 DIAGNOSIS — M54.16 LUMBAR RADICULITIS: ICD-10-CM

## 2020-09-21 DIAGNOSIS — M47.896 OTHER SPONDYLOSIS, LUMBAR REGION: ICD-10-CM

## 2020-09-21 PROCEDURE — 99214 OFFICE O/P EST MOD 30 MIN: CPT | Mod: S$GLB,,, | Performed by: ANESTHESIOLOGY

## 2020-09-21 PROCEDURE — 99999 PR PBB SHADOW E&M-EST. PATIENT-LVL III: ICD-10-PCS | Mod: PBBFAC,,, | Performed by: ANESTHESIOLOGY

## 2020-09-21 PROCEDURE — 3008F BODY MASS INDEX DOCD: CPT | Mod: CPTII,S$GLB,, | Performed by: ANESTHESIOLOGY

## 2020-09-21 PROCEDURE — 99214 PR OFFICE/OUTPT VISIT, EST, LEVL IV, 30-39 MIN: ICD-10-PCS | Mod: S$GLB,,, | Performed by: ANESTHESIOLOGY

## 2020-09-21 PROCEDURE — 99999 PR PBB SHADOW E&M-EST. PATIENT-LVL III: CPT | Mod: PBBFAC,,, | Performed by: ANESTHESIOLOGY

## 2020-09-21 PROCEDURE — 3008F PR BODY MASS INDEX (BMI) DOCUMENTED: ICD-10-PCS | Mod: CPTII,S$GLB,, | Performed by: ANESTHESIOLOGY

## 2020-09-21 RX ORDER — IBUPROFEN AND FAMOTIDINE 800; 26.6 MG/1; MG/1
1 TABLET, COATED ORAL 2 TIMES DAILY
COMMUNITY
End: 2020-10-16

## 2020-09-21 NOTE — PROGRESS NOTES
Referring Physician: Valeriy Bernard PA    PCP: Sil Palumbo MD    CC: back pain    Interval History:  Leandra Lopes is a 55 y.o. female with chronic back pain who returns to our clinic.  She was last seen in 12/2019.  She had a Lumbar NISHA at L2-3 at that time. Reports 80% relief. Pain has recurred and exacerbated recently. Continues to report difficulty with standing for prolonged periods and bending over. She is benefiting from dry needling. She does have an occasional stabbing pain that resolves after a few minutes. Denies any b/b changes or LE weakness. Pain is rated 6/10    Prior HPI:   Leandra Lopes is a 55 y.o. female with PMH significant for hx of breast cancer s/p surgical repair, SADIE on CPAP, and hx of partial colectomy presents as a referral for the evaluation of back pain. Back pain began approximately 10 years ago after no inciting incident or trauma. The pain was intermittent at that time and eventually resolved. Pain worsened approximately 3 years ago after no inciting incident or trauma. The patient subsequently participated in PT at that time with improvement of her pain. Pain recurrence 6 months ago after no inciting incident or trauma. This time around, PT did not provide benefit. The patient reports that her pain is similar to the pain she experienced approximately 10 years ago but reports that her pain is more intense at this time. Patient localizes her pain to the area across her lower back. Patient reports of radiation down the posterior aspect of her RLE into her heel. Back pain > RLE pain. Patient describes her pain as a stabbing type of pain. Patient reports that her current pain is a 7-8/10. Patient denies of any numbness or tingling in her lower extremities. Patient denies of any urinary/fecal incontinence, saddle anesthesia, or weakness.     Aggravating factors: prolonged standing; transitioning from the sitting to standing position, lifting, bending    Mitigating factors:  meloxicam; voltaren gel; PT; heating pad    Relevant Surgeries: no    Interventional Therapies: no    : Not applicable    Non-pharmacologic Treatment:     · Physical Therapy: yes; last session was one week ago  · Ice/Heat: yes; heating pad with benefit   · TENS: yes; tens unit with temporary relief  · Massage: no  · Chiropractic care: no  · Acupuncture: no         Pain Medications:         · Currently taking: meloxicam; voltaren gel; OTC tylenol  ·   · Has tried in the past:    · Opioids: no  · NSAIDS: yes  · Tylenol: yes  · Muscle relaxants: no  · TCAs: no  · SNRIs: no  · Anticonvulsants: no  · topical creams: yes    Anticoagulation: no    ROS:  Review of Systems   Constitutional: Negative for chills and fever.   HENT: Negative for sore throat.    Eyes: Negative for visual disturbance.   Respiratory: Negative for shortness of breath.    Cardiovascular: Negative for chest pain.   Gastrointestinal: Negative for nausea and vomiting.   Genitourinary: Negative for difficulty urinating.   Musculoskeletal: Positive for back pain.   Skin: Negative for rash.   Allergic/Immunologic: Negative for immunocompromised state.   Neurological: Negative for syncope.   Hematological: Does not bruise/bleed easily.   Psychiatric/Behavioral: Negative for suicidal ideas.        Past Medical History:   Diagnosis Date    Breast CA     Breast cancer 2013    left breast infiltrating ductal carcinoma, ER/AZ positive, Her2 negative    Difficult intravenous access     patient is a hard iv stick    Needle phobia     SADIE (obstructive sleep apnea)     on cpap     Past Surgical History:   Procedure Laterality Date    BREAST BIOPSY  2013    left breast- IDC    BREAST SURGERY  10//    bilateral mastectomy and reconstruction-no implants     SECTION      x3    CHOLECYSTECTOMY      COLON SURGERY  2009    benign neoplasm recheck 2014, 12 inches    EPIDURAL STEROID INJECTION INTO LUMBAR SPINE N/A 2019    Procedure:  Injection-steroid-epidural-lumbar;  Surgeon: Niko Meek MD;  Location: [facility];  Service: Pain Management;  Laterality: N/A;  L2-L3    HYSTERECTOMY      TVH/BSO, benign    TONSILLECTOMY      TUBAL LIGATION       Family History   Problem Relation Age of Onset    Cancer Mother         liver, bone, breast    Hypertension Mother     Heart disease Mother     Breast cancer Mother 63        initial dx ILC at 63 with recurrence at 67, 2nd CA IDC at 67    Cancer Father 62        bone ? breast    Diabetes Father     Breast cancer Maternal Aunt 50    Ovarian cancer Cousin 49        daughter of M Aunt with Breast CA     Social History     Socioeconomic History    Marital status:      Spouse name: Not on file    Number of children: Not on file    Years of education: Not on file    Highest education level: Not on file   Occupational History    Not on file   Social Needs    Financial resource strain: Not hard at all    Food insecurity     Worry: Never true     Inability: Never true    Transportation needs     Medical: No     Non-medical: No   Tobacco Use    Smoking status: Never Smoker    Smokeless tobacco: Never Used   Substance and Sexual Activity    Alcohol use: Yes     Frequency: Monthly or less     Drinks per session: 1 or 2     Binge frequency: Never     Comment: rarely    Drug use: No    Sexual activity: Yes     Partners: Male     Birth control/protection: Surgical   Lifestyle    Physical activity     Days per week: 2 days     Minutes per session: 10 min    Stress: To some extent   Relationships    Social connections     Talks on phone: Three times a week     Gets together: Three times a week     Attends [employer] service: Not on file     Active member of club or organization: No     Attends meetings of clubs or organizations: Never     Relationship status:    Other Topics Concern    Not on file   Social History Narrative    Not on file         Allergies: See med card    Vitals:  "   09/21/20 1533   BP: 115/78   Pulse: 73   Weight: 124.7 kg (274 lb 14.6 oz)   Height: 5' 7" (1.702 m)   PainSc:   8   PainLoc: Back         Physical exam:  Physical Exam   Constitutional: She is oriented to person, place, and time and well-developed, well-nourished, and in no distress.   HENT:   Head: Normocephalic and atraumatic.   Eyes: Conjunctivae and EOM are normal. Right eye exhibits no discharge. Left eye exhibits no discharge.   Cardiovascular: Normal rate.   Pulmonary/Chest: Effort normal and breath sounds normal. No respiratory distress.   Abdominal: Soft.   Neurological: She is alert and oriented to person, place, and time.   Skin: Skin is warm and dry. No rash noted. She is not diaphoretic.   Psychiatric: Mood, memory, affect and judgment normal.   Nursing note and vitals reviewed.       UPPER EXTREMITIES: Normal alignment, normal range of motion, no atrophy, no skin changes,  hair growth and nail growth normal and equal bilaterally. No swelling, no tenderness.    LOWER EXTREMITIES:  Normal alignment, normal range of motion, no atrophy, no skin changes,  hair growth and nail growth normal and equal bilaterally. No swelling, no tenderness.    LUMBAR SPINE  Lumbar spine: ROM is limited with flexion extension and oblique extension with increased pain on extension.     ((--)) Supine straight leg raise    ((+)) Facet loading bilaterally (R>L)  Internal and external rotation of the hip causes no increased pain on either side.  Myofascial exam: Tenderness to palpation across lumbar paraspinous muscles.    ((--)) TTP at the SI joint  ((+)) VAL's test on the right  ((--)) One leg stand    ((--)) Distraction test    CRANIAL NERVES:  II:  PERRL bilaterally,   III,IV,VI: EOMI.    V:  Facial sensation equal bilaterally  VII:  Facial motor function normal.  VIII:  Hearing equal to finger rub bilaterally  IX/X: Gag normal, palate symmetric  XI:  Shoulder shrug equal, head turn equal  XII:  Tongue midline without " fasciculations      MOTOR: Tone and bulk: normal bilateral upper and lower Strength: normal   Delt Bi Tri WE WF     R 5 5 5 5 5 5   L 5 5 5 5 5 5     IP ADD ABD Quad TA Gas HAM  R 5 5 5 5 5 5 5  L 5 5 5 5 5 5 5    SENSATION: Light touch and pinprick intact bilaterally  REFLEXES: normal, symmetric, nonbrisk.  Toes down, no clonus. Negative gallagher's sign bilaterally.  GAIT: normal rise, base, steps, and arm swing.        Imaging: MRI Lumbar spine without contrast (10/24/2019):  Mild dextroscoliosis with minimal retrolisthesis of L1 on L2 and L2 on L3    Moderate multilevel degenerative disc disease and facet hypertrophy.    Broad-based disc bulge at L2-3 resulting in moderate central canal stenosis and foraminal narrowing.  Correlation with bilateral L2 radicular symptoms is recommended.  There is a small central extruded disc fragment extending inferiorly posterior to the superior endplate of L3    Moderate right foraminal narrowing at L3-4 and correlation with right L3 radicular symptoms is recommended      Assessment:   Leandra Lopes is a 55 y.o. female with   1. DDD (degenerative disc disease), lumbar    2. Other spondylosis, lumbar region    3. Lumbar radiculitis        Plan:  1.  I have stressed the importance of physical activity and a home exercise plan to help with pain and improve overall health.  2.  Reviewed pertinent imaging and records with patient  3. I think that the patient's back pain and radicular leg symptoms are due to degenerative disc disease and have recommended a lumbar epidural steroid injection to the L2-3 level(s).  4. May consider lumbar MBB if above is not helpful  5. Follow up after procedure

## 2020-09-21 NOTE — H&P (VIEW-ONLY)
Referring Physician: Valeriy Bernard PA    PCP: Sil Palumbo MD    CC: back pain    Interval History:  Leandra Lopes is a 55 y.o. female with chronic back pain who returns to our clinic.  She was last seen in 12/2019.  She had a Lumbar NISHA at L2-3 at that time. Reports 80% relief. Pain has recurred and exacerbated recently. Continues to report difficulty with standing for prolonged periods and bending over. She is benefiting from dry needling. She does have an occasional stabbing pain that resolves after a few minutes. Denies any b/b changes or LE weakness. Pain is rated 6/10    Prior HPI:   Leandra Lopes is a 55 y.o. female with PMH significant for hx of breast cancer s/p surgical repair, SADIE on CPAP, and hx of partial colectomy presents as a referral for the evaluation of back pain. Back pain began approximately 10 years ago after no inciting incident or trauma. The pain was intermittent at that time and eventually resolved. Pain worsened approximately 3 years ago after no inciting incident or trauma. The patient subsequently participated in PT at that time with improvement of her pain. Pain recurrence 6 months ago after no inciting incident or trauma. This time around, PT did not provide benefit. The patient reports that her pain is similar to the pain she experienced approximately 10 years ago but reports that her pain is more intense at this time. Patient localizes her pain to the area across her lower back. Patient reports of radiation down the posterior aspect of her RLE into her heel. Back pain > RLE pain. Patient describes her pain as a stabbing type of pain. Patient reports that her current pain is a 7-8/10. Patient denies of any numbness or tingling in her lower extremities. Patient denies of any urinary/fecal incontinence, saddle anesthesia, or weakness.     Aggravating factors: prolonged standing; transitioning from the sitting to standing position, lifting, bending    Mitigating factors:  meloxicam; voltaren gel; PT; heating pad    Relevant Surgeries: no    Interventional Therapies: no    : Not applicable    Non-pharmacologic Treatment:     · Physical Therapy: yes; last session was one week ago  · Ice/Heat: yes; heating pad with benefit   · TENS: yes; tens unit with temporary relief  · Massage: no  · Chiropractic care: no  · Acupuncture: no         Pain Medications:         · Currently taking: meloxicam; voltaren gel; OTC tylenol  ·   · Has tried in the past:    · Opioids: no  · NSAIDS: yes  · Tylenol: yes  · Muscle relaxants: no  · TCAs: no  · SNRIs: no  · Anticonvulsants: no  · topical creams: yes    Anticoagulation: no    ROS:  Review of Systems   Constitutional: Negative for chills and fever.   HENT: Negative for sore throat.    Eyes: Negative for visual disturbance.   Respiratory: Negative for shortness of breath.    Cardiovascular: Negative for chest pain.   Gastrointestinal: Negative for nausea and vomiting.   Genitourinary: Negative for difficulty urinating.   Musculoskeletal: Positive for back pain.   Skin: Negative for rash.   Allergic/Immunologic: Negative for immunocompromised state.   Neurological: Negative for syncope.   Hematological: Does not bruise/bleed easily.   Psychiatric/Behavioral: Negative for suicidal ideas.        Past Medical History:   Diagnosis Date    Breast CA     Breast cancer 2013    left breast infiltrating ductal carcinoma, ER/[place] positive, Her2 negative    Difficult intravenous access     patient is a hard iv stick    Needle phobia     SADIE (obstructive sleep apnea)     on cpap     Past Surgical History:   Procedure Laterality Date    BREAST BIOPSY  2013    left breast- IDC    BREAST SURGERY  10//    bilateral mastectomy and reconstruction-no implants     SECTION      x3    CHOLECYSTECTOMY      COLON SURGERY  2009    benign neoplasm recheck 2014, 12 inches    EPIDURAL STEROID INJECTION INTO LUMBAR SPINE N/A 2019    Procedure:  Injection-steroid-epidural-lumbar;  Surgeon: Niko Meek MD;  Location: [facility];  Service: Pain Management;  Laterality: N/A;  L2-L3    HYSTERECTOMY      TVH/BSO, benign    TONSILLECTOMY      TUBAL LIGATION       Family History   Problem Relation Age of Onset    Cancer Mother         liver, bone, breast    Hypertension Mother     Heart disease Mother     Breast cancer Mother 63        initial dx ILC at 63 with recurrence at 67, 2nd CA IDC at 67    Cancer Father 62        bone ? breast    Diabetes Father     Breast cancer Maternal Aunt 50    Ovarian cancer Cousin 49        daughter of M Aunt with Breast CA     Social History     Socioeconomic History    Marital status:      Spouse name: Not on file    Number of children: Not on file    Years of education: Not on file    Highest education level: Not on file   Occupational History    Not on file   Social Needs    Financial resource strain: Not hard at all    Food insecurity     Worry: Never true     Inability: Never true    Transportation needs     Medical: No     Non-medical: No   Tobacco Use    Smoking status: Never Smoker    Smokeless tobacco: Never Used   Substance and Sexual Activity    Alcohol use: Yes     Frequency: Monthly or less     Drinks per session: 1 or 2     Binge frequency: Never     Comment: rarely    Drug use: No    Sexual activity: Yes     Partners: Male     Birth control/protection: Surgical   Lifestyle    Physical activity     Days per week: 2 days     Minutes per session: 10 min    Stress: To some extent   Relationships    Social connections     Talks on phone: Three times a week     Gets together: Three times a week     Attends Yazdanism service: Not on file     Active member of club or organization: No     Attends meetings of clubs or organizations: Never     Relationship status:    Other Topics Concern    Not on file   Social History Narrative    Not on file         Allergies: See med card    Vitals:  "   09/21/20 1533   BP: 115/78   Pulse: 73   Weight: 124.7 kg (274 lb 14.6 oz)   Height: 5' 7" (1.702 m)   PainSc:   8   PainLoc: Back         Physical exam:  Physical Exam   Constitutional: She is oriented to person, place, and time and well-developed, well-nourished, and in no distress.   HENT:   Head: Normocephalic and atraumatic.   Eyes: Conjunctivae and EOM are normal. Right eye exhibits no discharge. Left eye exhibits no discharge.   Cardiovascular: Normal rate.   Pulmonary/Chest: Effort normal and breath sounds normal. No respiratory distress.   Abdominal: Soft.   Neurological: She is alert and oriented to person, place, and time.   Skin: Skin is warm and dry. No rash noted. She is not diaphoretic.   Psychiatric: Mood, memory, affect and judgment normal.   Nursing note and vitals reviewed.       UPPER EXTREMITIES: Normal alignment, normal range of motion, no atrophy, no skin changes,  hair growth and nail growth normal and equal bilaterally. No swelling, no tenderness.    LOWER EXTREMITIES:  Normal alignment, normal range of motion, no atrophy, no skin changes,  hair growth and nail growth normal and equal bilaterally. No swelling, no tenderness.    LUMBAR SPINE  Lumbar spine: ROM is limited with flexion extension and oblique extension with increased pain on extension.     ((--)) Supine straight leg raise    ((+)) Facet loading bilaterally (R>L)  Internal and external rotation of the hip causes no increased pain on either side.  Myofascial exam: Tenderness to palpation across lumbar paraspinous muscles.    ((--)) TTP at the SI joint  ((+)) VAL's test on the right  ((--)) One leg stand    ((--)) Distraction test    CRANIAL NERVES:  II:  PERRL bilaterally,   III,IV,VI: EOMI.    V:  Facial sensation equal bilaterally  VII:  Facial motor function normal.  VIII:  Hearing equal to finger rub bilaterally  IX/X: Gag normal, palate symmetric  XI:  Shoulder shrug equal, head turn equal  XII:  Tongue midline without " fasciculations      MOTOR: Tone and bulk: normal bilateral upper and lower Strength: normal   Delt Bi Tri WE WF     R 5 5 5 5 5 5   L 5 5 5 5 5 5     IP ADD ABD Quad TA Gas HAM  R 5 5 5 5 5 5 5  L 5 5 5 5 5 5 5    SENSATION: Light touch and pinprick intact bilaterally  REFLEXES: normal, symmetric, nonbrisk.  Toes down, no clonus. Negative gallagher's sign bilaterally.  GAIT: normal rise, base, steps, and arm swing.        Imaging: MRI Lumbar spine without contrast (10/24/2019):  Mild dextroscoliosis with minimal retrolisthesis of L1 on L2 and L2 on L3    Moderate multilevel degenerative disc disease and facet hypertrophy.    Broad-based disc bulge at L2-3 resulting in moderate central canal stenosis and foraminal narrowing.  Correlation with bilateral L2 radicular symptoms is recommended.  There is a small central extruded disc fragment extending inferiorly posterior to the superior endplate of L3    Moderate right foraminal narrowing at L3-4 and correlation with right L3 radicular symptoms is recommended      Assessment:   Leandra Lopes is a 55 y.o. female with   1. DDD (degenerative disc disease), lumbar    2. Other spondylosis, lumbar region    3. Lumbar radiculitis        Plan:  1.  I have stressed the importance of physical activity and a home exercise plan to help with pain and improve overall health.  2.  Reviewed pertinent imaging and records with patient  3. I think that the patient's back pain and radicular leg symptoms are due to degenerative disc disease and have recommended a lumbar epidural steroid injection to the L2-3 level(s).  4. May consider lumbar MBB if above is not helpful  5. Follow up after procedure

## 2020-10-05 ENCOUNTER — PATIENT MESSAGE (OUTPATIENT)
Dept: ADMINISTRATIVE | Facility: HOSPITAL | Age: 55
End: 2020-10-05

## 2020-10-17 ENCOUNTER — LAB VISIT (OUTPATIENT)
Dept: SPORTS MEDICINE | Facility: CLINIC | Age: 55
End: 2020-10-17
Payer: COMMERCIAL

## 2020-10-17 DIAGNOSIS — Z01.818 PRE-OP TESTING: ICD-10-CM

## 2020-10-17 PROCEDURE — U0003 INFECTIOUS AGENT DETECTION BY NUCLEIC ACID (DNA OR RNA); SEVERE ACUTE RESPIRATORY SYNDROME CORONAVIRUS 2 (SARS-COV-2) (CORONAVIRUS DISEASE [COVID-19]), AMPLIFIED PROBE TECHNIQUE, MAKING USE OF HIGH THROUGHPUT TECHNOLOGIES AS DESCRIBED BY CMS-2020-01-R: HCPCS

## 2020-10-18 LAB — SARS-COV-2 RNA RESP QL NAA+PROBE: NOT DETECTED

## 2020-10-20 ENCOUNTER — HOSPITAL ENCOUNTER (OUTPATIENT)
Facility: AMBULARY SURGERY CENTER | Age: 55
Discharge: HOME | End: 2020-10-20
Attending: ANESTHESIOLOGY | Admitting: ANESTHESIOLOGY
Payer: COMMERCIAL

## 2020-10-20 DIAGNOSIS — M54.16 LUMBAR RADICULITIS: Primary | ICD-10-CM

## 2020-10-20 PROCEDURE — 62323 NJX INTERLAMINAR LMBR/SAC: CPT | Performed by: ANESTHESIOLOGY

## 2020-10-20 PROCEDURE — 62323 NJX INTERLAMINAR LMBR/SAC: CPT | Mod: ,,, | Performed by: ANESTHESIOLOGY

## 2020-10-20 PROCEDURE — 62323 PR INJ LUMBAR/SACRAL, W/IMAGING GUIDANCE: ICD-10-PCS | Mod: ,,, | Performed by: ANESTHESIOLOGY

## 2020-10-20 RX ORDER — DEXAMETHASONE SODIUM PHOSPHATE 10 MG/ML
INJECTION INTRAMUSCULAR; INTRAVENOUS
Status: DISCONTINUED | OUTPATIENT
Start: 2020-10-20 | End: 2020-10-20 | Stop reason: HOSPADM

## 2020-10-20 RX ORDER — SODIUM CHLORIDE, SODIUM LACTATE, POTASSIUM CHLORIDE, CALCIUM CHLORIDE 600; 310; 30; 20 MG/100ML; MG/100ML; MG/100ML; MG/100ML
INJECTION, SOLUTION INTRAVENOUS ONCE AS NEEDED
Status: DISCONTINUED | OUTPATIENT
Start: 2020-10-20 | End: 2020-10-20 | Stop reason: HOSPADM

## 2020-10-20 RX ORDER — SODIUM CHLORIDE 9 MG/ML
INJECTION, SOLUTION INTRAMUSCULAR; INTRAVENOUS; SUBCUTANEOUS
Status: DISCONTINUED | OUTPATIENT
Start: 2020-10-20 | End: 2020-10-20 | Stop reason: HOSPADM

## 2020-10-20 RX ORDER — ALPRAZOLAM 1 MG/1
1 TABLET, ORALLY DISINTEGRATING ORAL
Status: COMPLETED | OUTPATIENT
Start: 2020-10-20 | End: 2020-10-20

## 2020-10-20 RX ORDER — LIDOCAINE HYDROCHLORIDE 10 MG/ML
INJECTION, SOLUTION EPIDURAL; INFILTRATION; INTRACAUDAL; PERINEURAL
Status: DISCONTINUED | OUTPATIENT
Start: 2020-10-20 | End: 2020-10-20 | Stop reason: HOSPADM

## 2020-10-20 RX ADMIN — ALPRAZOLAM 1 MG: 1 TABLET, ORALLY DISINTEGRATING ORAL at 09:10

## 2020-10-20 NOTE — DISCHARGE SUMMARY
[facility]  Discharge Note  Short Stay    Procedure(s) (LRB):  Injection-steroid-epidural-lumbar (N/A)    OUTCOME: Patient tolerated treatment/procedure well without complication and is now ready for discharge.    DISPOSITION: Home or Self Care    FINAL DIAGNOSIS:  Lumbar radiculitis    FOLLOWUP: In clinic    DISCHARGE INSTRUCTIONS:    Discharge Procedure Orders   Call MD for:  temperature >100.4     Call MD for:  persistent nausea and vomiting or diarrhea     Call MD for:  severe uncontrolled pain     Call MD for:  redness, tenderness, or signs of infection (pain, swelling, redness, odor or green/yellow discharge around incision site)     Call MD for:  difficulty breathing or increased cough     Call MD for:  severe persistent headache

## 2020-10-20 NOTE — PLAN OF CARE
Stable states ready to go home, ragini po fluids, pain less, no leg weakness, ambulated to car with RN to friend

## 2020-10-20 NOTE — OP NOTE
PROCEDURE DATE: 10/20/2020    Procedure:   Interlaminar epidural steroid injection at L2-3 under fluoroscopic guidance.    Diagnosis: lUMBAR DISC DISPLACEMENT WITHOUT MYELOPATHY  pOSTOP DIAGNOSIS: sAME    Physician: Niko Meek M.D.    Medications injected:10 mg dexamethasone with 4 ml of preservative free NaCl    Local anesthetic injected:    Lidocaine 1% 2 ml total    Sedation Medications: None    Estimated blood loss:  None    Complications:  Non    Technique:  Time-out taken to identify patient and procedure prior to starting the procedure.  With the patient laying in a prone position, the area was prepped and draped in the usual sterile fashion using ChloraPrep and a fenestrated drape.  After determining the target level with an AP fluoroscopic view, local anesthetic was given using a 25-gauge 1.5 inch needle by raising a wheal and then infiltrating toward the interlaminar entry space.  A 3.5inch 20-gauge Touhy needle was introduced under AP fluoroscopic guidance to the interlaminar space of L2-3. Once the trajectory was established, the needle was visualized in the lateral view and advanced using loss of resistance technique. Once in the desired position, 1ml contrast was injected to confirm placement and there was no vascular uptake nor intrathecal spread.  The medication was then injected slowly. The patient tolerated the procedure well.      The patient was monitored after the procedure.   They were given post-procedure and discharge instructions to follow at home.  The patient was discharged in a stable condition.

## 2020-10-20 NOTE — INTERVAL H&P NOTE
The patient has been examined and the H&P has been reviewed:    I concur with the findings and no changes have occurred since H&P was written.    Anesthesia risks, benefits and alternative options discussed and understood by patient/family.  This patient has been cleared for surgery in an ambulatory surgical facility    ASA 3,  Mallampatti Score 3  No history of anesthetic complications  Plan for RN IV sedation          Active Hospital Problems    Diagnosis  POA    Lumbar radiculitis [M54.16]  Yes      Resolved Hospital Problems   No resolved problems to display.

## 2020-10-21 VITALS
BODY MASS INDEX: 43.15 KG/M2 | TEMPERATURE: 98 F | RESPIRATION RATE: 20 BRPM | OXYGEN SATURATION: 97 % | SYSTOLIC BLOOD PRESSURE: 116 MMHG | WEIGHT: 274.94 LBS | HEART RATE: 65 BPM | DIASTOLIC BLOOD PRESSURE: 74 MMHG | HEIGHT: 67 IN

## 2020-11-23 ENCOUNTER — OFFICE VISIT (OUTPATIENT)
Dept: PAIN MEDICINE | Facility: CLINIC | Age: 55
End: 2020-11-23
Payer: COMMERCIAL

## 2020-11-23 VITALS
DIASTOLIC BLOOD PRESSURE: 85 MMHG | SYSTOLIC BLOOD PRESSURE: 133 MMHG | BODY MASS INDEX: 43 KG/M2 | HEIGHT: 67 IN | WEIGHT: 274 LBS | HEART RATE: 76 BPM

## 2020-11-23 DIAGNOSIS — M47.896 OTHER SPONDYLOSIS, LUMBAR REGION: ICD-10-CM

## 2020-11-23 DIAGNOSIS — M51.36 DDD (DEGENERATIVE DISC DISEASE), LUMBAR: ICD-10-CM

## 2020-11-23 DIAGNOSIS — M54.16 LUMBAR RADICULITIS: Primary | ICD-10-CM

## 2020-11-23 PROCEDURE — 3008F BODY MASS INDEX DOCD: CPT | Mod: CPTII,S$GLB,, | Performed by: PHYSICIAN ASSISTANT

## 2020-11-23 PROCEDURE — 99213 OFFICE O/P EST LOW 20 MIN: CPT | Mod: S$GLB,,, | Performed by: PHYSICIAN ASSISTANT

## 2020-11-23 PROCEDURE — 1125F PR PAIN SEVERITY QUANTIFIED, PAIN PRESENT: ICD-10-PCS | Mod: S$GLB,,, | Performed by: PHYSICIAN ASSISTANT

## 2020-11-23 PROCEDURE — 99999 PR PBB SHADOW E&M-EST. PATIENT-LVL III: ICD-10-PCS | Mod: PBBFAC,,, | Performed by: PHYSICIAN ASSISTANT

## 2020-11-23 PROCEDURE — 3008F PR BODY MASS INDEX (BMI) DOCUMENTED: ICD-10-PCS | Mod: CPTII,S$GLB,, | Performed by: PHYSICIAN ASSISTANT

## 2020-11-23 PROCEDURE — 1125F AMNT PAIN NOTED PAIN PRSNT: CPT | Mod: S$GLB,,, | Performed by: PHYSICIAN ASSISTANT

## 2020-11-23 PROCEDURE — 99213 PR OFFICE/OUTPT VISIT, EST, LEVL III, 20-29 MIN: ICD-10-PCS | Mod: S$GLB,,, | Performed by: PHYSICIAN ASSISTANT

## 2020-11-23 PROCEDURE — 99999 PR PBB SHADOW E&M-EST. PATIENT-LVL III: CPT | Mod: PBBFAC,,, | Performed by: PHYSICIAN ASSISTANT

## 2020-11-24 NOTE — PROGRESS NOTES
Referring Physician: No ref. provider found    PCP: Sil Palumbo MD    CC: back pain    Interval History:  Leandra Lopes is a 55 y.o. female with chronic back pain who presents today for f/u. She is s/p Lumbar NISHA at L2-3 that provided no improvement in symptoms. Previous provided  80% relief.Continues to report difficulty with standing for prolonged periods and bending over. She has benefited from dry needling. She does have an occasional stabbing pain that resolves after a few minutes. Denies any b/b changes or LE weakness. She plans to get a 2nd opinion from a spine specialist.  Pain is rated 7/10    Prior HPI:   Leandra Lopes is a 55 y.o. female with PMH significant for hx of breast cancer s/p surgical repair, SADIE on CPAP, and hx of partial colectomy presents as a referral for the evaluation of back pain. Back pain began approximately 10 years ago after no inciting incident or trauma. The pain was intermittent at that time and eventually resolved. Pain worsened approximately 3 years ago after no inciting incident or trauma. The patient subsequently participated in PT at that time with improvement of her pain. Pain recurrence 6 months ago after no inciting incident or trauma. This time around, PT did not provide benefit. The patient reports that her pain is similar to the pain she experienced approximately 10 years ago but reports that her pain is more intense at this time. Patient localizes her pain to the area across her lower back. Patient reports of radiation down the posterior aspect of her RLE into her heel. Back pain > RLE pain. Patient describes her pain as a stabbing type of pain. Patient reports that her current pain is a 7-8/10. Patient denies of any numbness or tingling in her lower extremities. Patient denies of any urinary/fecal incontinence, saddle anesthesia, or weakness.     Aggravating factors: prolonged standing; transitioning from the sitting to standing position, lifting,  bending    Mitigating factors: meloxicam; voltaren gel; PT; heating pad    Relevant Surgeries: no    Interventional Therapies: no    : Not applicable    Non-pharmacologic Treatment:     · Physical Therapy: yes; last session was one week ago  · Ice/Heat: yes; heating pad with benefit   · TENS: yes; tens unit with temporary relief  · Massage: no  · Chiropractic care: no  · Acupuncture: no         Pain Medications:         · Currently taking: meloxicam; voltaren gel; OTC tylenol  ·   · Has tried in the past:    · Opioids: no  · NSAIDS: yes  · Tylenol: yes  · Muscle relaxants: no  · TCAs: no  · SNRIs: no  · Anticonvulsants: no  · topical creams: yes    Anticoagulation: no    ROS:  Review of Systems   Constitutional: Negative for chills and fever.   HENT: Negative for sore throat.    Eyes: Negative for visual disturbance.   Respiratory: Negative for shortness of breath.    Cardiovascular: Negative for chest pain.   Gastrointestinal: Negative for nausea and vomiting.   Genitourinary: Negative for difficulty urinating.   Musculoskeletal: Positive for back pain.   Skin: Negative for rash.   Allergic/Immunologic: Negative for immunocompromised state.   Neurological: Negative for syncope.   Hematological: Does not bruise/bleed easily.   Psychiatric/Behavioral: Negative for suicidal ideas.        Past Medical History:   Diagnosis Date    Breast CA     Breast cancer 2013    left breast infiltrating ductal carcinoma, ER/[place] positive, Her2 negative    Difficult intravenous access     patient is a hard iv stick    Needle phobia     SADIE (obstructive sleep apnea)     on cpap     Past Surgical History:   Procedure Laterality Date    BREAST BIOPSY  2013    left breast- IDC    BREAST SURGERY  10//    bilateral mastectomy and reconstruction-no implants     SECTION      x3    CHOLECYSTECTOMY      COLON SURGERY  2009    benign neoplasm recheck 2014, 12 inches    EPIDURAL STEROID INJECTION INTO LUMBAR SPINE  N/A 11/26/2019    Procedure: Injection-steroid-epidural-lumbar;  Surgeon: Niko Meek MD;  [facility]  Service: Pain Management;  Laterality: N/A;  L2-L3    EPIDURAL STEROID INJECTION INTO LUMBAR SPINE N/A 10/20/2020    Procedure: Injection-steroid-epidural-lumbar;  Surgeon: Niko Meek MD;  [facility];  Service: Pain Management;  Laterality: N/A;  L2-3   Pt wanted to let ASC know she is a hard stick     HYSTERECTOMY      TVH/BSO, benign    TONSILLECTOMY      TUBAL LIGATION       Family History   Problem Relation Age of Onset    Cancer Mother         liver, bone, breast    Hypertension Mother     Heart disease Mother     Breast cancer Mother 63        initial dx ILC at 63 with recurrence at 67, 2nd CA IDC at 67    Cancer Father 62        bone ? breast    Diabetes Father     Breast cancer Maternal Aunt 50    Ovarian cancer Cousin 49        daughter of M Aunt with Breast CA     Social History     Socioeconomic History    Marital status:      Spouse name: Not on file    Number of children: Not on file    Years of education: Not on file    Highest education level: Not on file   Occupational History    Not on file   Social Needs    Financial resource strain: Not hard at all    Food insecurity     Worry: Never true     Inability: Never true    Transportation needs     Medical: No     Non-medical: No   Tobacco Use    Smoking status: Never Smoker    Smokeless tobacco: Never Used   Substance and Sexual Activity    Alcohol use: Yes     Frequency: Monthly or less     Drinks per session: 1 or 2     Binge frequency: Never     Comment: rarely    Drug use: No    Sexual activity: Yes     Partners: Male     Birth control/protection: Surgical   Lifestyle    Physical activity     Days per week: 2 days     Minutes per session: 10 min    Stress: To some extent   Relationships    Social connections     Talks on phone: Three times a week     Gets together: Three times a week     Attends  "Adventism service: Not on file     Active member of club or organization: No     Attends meetings of clubs or organizations: Never     Relationship status:    Other Topics Concern    Not on file   Social History Narrative    Not on file     Allergies: See med card    Vitals:    11/23/20 1112   BP: 133/85   Pulse: 76   Weight: 124.3 kg (274 lb)   Height: 5' 7" (1.702 m)   PainSc:   7   PainLoc: Back       Physical exam:  Physical Exam   Constitutional: She is oriented to person, place, and time and well-developed, well-nourished, and in no distress.   HENT:   Head: Normocephalic and atraumatic.   Eyes: Conjunctivae and EOM are normal. Right eye exhibits no discharge. Left eye exhibits no discharge.   Cardiovascular: Normal rate.   Pulmonary/Chest: Effort normal and breath sounds normal. No respiratory distress.   Abdominal: Soft.   Neurological: She is alert and oriented to person, place, and time.   Skin: Skin is warm and dry. No rash noted. She is not diaphoretic.   Psychiatric: Mood, memory, affect and judgment normal.   Nursing note and vitals reviewed.       UPPER EXTREMITIES: Normal alignment, normal range of motion, no atrophy, no skin changes,  hair growth and nail growth normal and equal bilaterally. No swelling, no tenderness.    LOWER EXTREMITIES:  Normal alignment, normal range of motion, no atrophy, no skin changes,  hair growth and nail growth normal and equal bilaterally. No swelling, no tenderness.    LUMBAR SPINE  Lumbar spine: ROM is limited with flexion extension and oblique extension with increased pain on extension.     ((--)) Supine straight leg raise    ((+)) Facet loading bilaterally (R>L)  Internal and external rotation of the hip causes no increased pain on either side.  Myofascial exam: Tenderness to palpation across lumbar paraspinous muscles.    ((--)) TTP at the SI joint  ((+)) VAL's test on the right  ((--)) One leg stand    ((--)) Distraction test    CRANIAL NERVES:  II: "  PERRL bilaterally,   III,IV,VI: EOMI.    V:  Facial sensation equal bilaterally  VII:  Facial motor function normal.  VIII:  Hearing equal to finger rub bilaterally  IX/X: Gag normal, palate symmetric  XI:  Shoulder shrug equal, head turn equal  XII:  Tongue midline without fasciculations      MOTOR: Tone and bulk: normal bilateral upper and lower Strength: normal   Delt Bi Tri WE WF     R 5 5 5 5 5 5   L 5 5 5 5 5 5     IP ADD ABD Quad TA Gas HAM  R 5 5 5 5 5 5 5  L 5 5 5 5 5 5 5    SENSATION: Light touch and pinprick intact bilaterally  REFLEXES: normal, symmetric, nonbrisk.  Toes down, no clonus. Negative gallagher's sign bilaterally.  GAIT: normal rise, base, steps, and arm swing.        Imaging: MRI Lumbar spine without contrast (10/24/2019):  Mild dextroscoliosis with minimal retrolisthesis of L1 on L2 and L2 on L3    Moderate multilevel degenerative disc disease and facet hypertrophy.    Broad-based disc bulge at L2-3 resulting in moderate central canal stenosis and foraminal narrowing.  Correlation with bilateral L2 radicular symptoms is recommended.  There is a small central extruded disc fragment extending inferiorly posterior to the superior endplate of L3    Moderate right foraminal narrowing at L3-4 and correlation with right L3 radicular symptoms is recommended      Assessment:   Leandra Lopes is a 55 y.o. female with   1. Lumbar radiculitis    2. Other spondylosis, lumbar region    3. DDD (degenerative disc disease), lumbar        Plan:  1.  I have stressed the importance of physical activity and a home exercise plan to help with pain and improve overall health.  2.  Reviewed pertinent imaging and records with patient  3. Discussed repeating lumbar epidural steroid injection to the L2-3 level(s). She had good relief with first NISHA and will consider this.  4. May consider lumbar MBB if above is not helpful  5. Follow up prn

## 2021-01-12 ENCOUNTER — TELEPHONE (OUTPATIENT)
Dept: FAMILY MEDICINE | Facility: CLINIC | Age: 56
End: 2021-01-12

## 2021-01-15 ENCOUNTER — OFFICE VISIT (OUTPATIENT)
Dept: FAMILY MEDICINE | Facility: CLINIC | Age: 56
End: 2021-01-15
Payer: COMMERCIAL

## 2021-01-15 VITALS
WEIGHT: 272.5 LBS | TEMPERATURE: 98 F | RESPIRATION RATE: 18 BRPM | HEART RATE: 70 BPM | SYSTOLIC BLOOD PRESSURE: 116 MMHG | HEIGHT: 67 IN | BODY MASS INDEX: 42.77 KG/M2 | OXYGEN SATURATION: 99 % | DIASTOLIC BLOOD PRESSURE: 80 MMHG

## 2021-01-15 DIAGNOSIS — E66.01 OBESITY, CLASS III, BMI 40-49.9 (MORBID OBESITY): ICD-10-CM

## 2021-01-15 DIAGNOSIS — F32.A ANXIETY AND DEPRESSION: ICD-10-CM

## 2021-01-15 DIAGNOSIS — R11.0 NAUSEA: ICD-10-CM

## 2021-01-15 DIAGNOSIS — M79.10 MUSCLE PAIN: ICD-10-CM

## 2021-01-15 DIAGNOSIS — R52 GENERALIZED BODY ACHES: Primary | ICD-10-CM

## 2021-01-15 DIAGNOSIS — F41.9 ANXIETY AND DEPRESSION: ICD-10-CM

## 2021-01-15 PROCEDURE — 99204 OFFICE O/P NEW MOD 45 MIN: CPT | Mod: S$GLB,,, | Performed by: FAMILY MEDICINE

## 2021-01-15 PROCEDURE — 1125F AMNT PAIN NOTED PAIN PRSNT: CPT | Mod: S$GLB,,, | Performed by: FAMILY MEDICINE

## 2021-01-15 PROCEDURE — 99999 PR PBB SHADOW E&M-EST. PATIENT-LVL V: CPT | Mod: PBBFAC,,, | Performed by: FAMILY MEDICINE

## 2021-01-15 PROCEDURE — U0003 INFECTIOUS AGENT DETECTION BY NUCLEIC ACID (DNA OR RNA); SEVERE ACUTE RESPIRATORY SYNDROME CORONAVIRUS 2 (SARS-COV-2) (CORONAVIRUS DISEASE [COVID-19]), AMPLIFIED PROBE TECHNIQUE, MAKING USE OF HIGH THROUGHPUT TECHNOLOGIES AS DESCRIBED BY CMS-2020-01-R: HCPCS

## 2021-01-15 PROCEDURE — 99204 PR OFFICE/OUTPT VISIT, NEW, LEVL IV, 45-59 MIN: ICD-10-PCS | Mod: S$GLB,,, | Performed by: FAMILY MEDICINE

## 2021-01-15 PROCEDURE — 3008F PR BODY MASS INDEX (BMI) DOCUMENTED: ICD-10-PCS | Mod: CPTII,S$GLB,, | Performed by: FAMILY MEDICINE

## 2021-01-15 PROCEDURE — 1125F PR PAIN SEVERITY QUANTIFIED, PAIN PRESENT: ICD-10-PCS | Mod: S$GLB,,, | Performed by: FAMILY MEDICINE

## 2021-01-15 PROCEDURE — 3008F BODY MASS INDEX DOCD: CPT | Mod: CPTII,S$GLB,, | Performed by: FAMILY MEDICINE

## 2021-01-15 PROCEDURE — 99999 PR PBB SHADOW E&M-EST. PATIENT-LVL V: ICD-10-PCS | Mod: PBBFAC,,, | Performed by: FAMILY MEDICINE

## 2021-01-15 RX ORDER — CLONAZEPAM 0.25 MG/1
0.25 TABLET, ORALLY DISINTEGRATING ORAL 2 TIMES DAILY
Qty: 60 TABLET | Refills: 0 | Status: SHIPPED | OUTPATIENT
Start: 2021-01-15 | End: 2021-04-08

## 2021-01-15 RX ORDER — SERTRALINE HYDROCHLORIDE 25 MG/1
25 TABLET, FILM COATED ORAL DAILY
Qty: 30 TABLET | Refills: 2 | Status: SHIPPED | OUTPATIENT
Start: 2021-01-15 | End: 2021-04-08

## 2021-01-15 RX ORDER — AZITHROMYCIN 250 MG/1
500 TABLET, FILM COATED ORAL DAILY
COMMUNITY
End: 2021-04-08

## 2021-01-16 ENCOUNTER — PATIENT MESSAGE (OUTPATIENT)
Dept: FAMILY MEDICINE | Facility: CLINIC | Age: 56
End: 2021-01-16

## 2021-01-16 LAB — SARS-COV-2 RNA RESP QL NAA+PROBE: NOT DETECTED

## 2021-01-18 PROBLEM — Z79.811 LONG TERM (CURRENT) USE OF AROMATASE INHIBITORS: Status: ACTIVE | Noted: 2021-01-18

## 2021-01-19 ENCOUNTER — PATIENT MESSAGE (OUTPATIENT)
Dept: FAMILY MEDICINE | Facility: CLINIC | Age: 56
End: 2021-01-19

## 2021-01-29 ENCOUNTER — HOSPITAL ENCOUNTER (OUTPATIENT)
Dept: RADIOLOGY | Facility: HOSPITAL | Age: 56
Discharge: HOME | End: 2021-01-29
Attending: INTERNAL MEDICINE
Payer: COMMERCIAL

## 2021-01-29 DIAGNOSIS — C50.912 MALIGNANT NEOPLASM OF LEFT BREAST IN FEMALE, ESTROGEN RECEPTOR POSITIVE, UNSPECIFIED SITE OF BREAST: ICD-10-CM

## 2021-01-29 DIAGNOSIS — Z17.0 MALIGNANT NEOPLASM OF LEFT BREAST IN FEMALE, ESTROGEN RECEPTOR POSITIVE, UNSPECIFIED SITE OF BREAST: ICD-10-CM

## 2021-01-29 DIAGNOSIS — Z79.811 LONG TERM (CURRENT) USE OF AROMATASE INHIBITORS: ICD-10-CM

## 2021-01-29 PROCEDURE — 71260 CT THORAX DX C+: CPT | Mod: TC,PO

## 2021-01-29 PROCEDURE — 25500020 PHARM REV CODE 255: Mod: PO | Performed by: INTERNAL MEDICINE

## 2021-01-29 PROCEDURE — 74177 CT ABD & PELVIS W/CONTRAST: CPT | Mod: TC,PO

## 2021-01-29 RX ADMIN — IOHEXOL 100 ML: 350 INJECTION, SOLUTION INTRAVENOUS at 02:01

## 2021-02-22 ENCOUNTER — OFFICE VISIT (OUTPATIENT)
Dept: FAMILY MEDICINE | Facility: CLINIC | Age: 56
End: 2021-02-22
Payer: COMMERCIAL

## 2021-02-22 VITALS
OXYGEN SATURATION: 98 % | WEIGHT: 288.56 LBS | DIASTOLIC BLOOD PRESSURE: 82 MMHG | SYSTOLIC BLOOD PRESSURE: 138 MMHG | RESPIRATION RATE: 16 BRPM | BODY MASS INDEX: 45.29 KG/M2 | TEMPERATURE: 98 F | HEIGHT: 67 IN | HEART RATE: 80 BPM

## 2021-02-22 DIAGNOSIS — F41.9 ANXIETY AND DEPRESSION: Primary | ICD-10-CM

## 2021-02-22 DIAGNOSIS — M54.16 LUMBAR RADICULITIS: ICD-10-CM

## 2021-02-22 DIAGNOSIS — F32.A ANXIETY AND DEPRESSION: Primary | ICD-10-CM

## 2021-02-22 PROCEDURE — 3008F PR BODY MASS INDEX (BMI) DOCUMENTED: ICD-10-PCS | Mod: CPTII,S$GLB,, | Performed by: FAMILY MEDICINE

## 2021-02-22 PROCEDURE — 99213 OFFICE O/P EST LOW 20 MIN: CPT | Mod: S$GLB,,, | Performed by: FAMILY MEDICINE

## 2021-02-22 PROCEDURE — 1125F PR PAIN SEVERITY QUANTIFIED, PAIN PRESENT: ICD-10-PCS | Mod: S$GLB,,, | Performed by: FAMILY MEDICINE

## 2021-02-22 PROCEDURE — 3008F BODY MASS INDEX DOCD: CPT | Mod: CPTII,S$GLB,, | Performed by: FAMILY MEDICINE

## 2021-02-22 PROCEDURE — 99999 PR PBB SHADOW E&M-EST. PATIENT-LVL V: ICD-10-PCS | Mod: PBBFAC,,, | Performed by: FAMILY MEDICINE

## 2021-02-22 PROCEDURE — 99213 PR OFFICE/OUTPT VISIT, EST, LEVL III, 20-29 MIN: ICD-10-PCS | Mod: S$GLB,,, | Performed by: FAMILY MEDICINE

## 2021-02-22 PROCEDURE — 99999 PR PBB SHADOW E&M-EST. PATIENT-LVL V: CPT | Mod: PBBFAC,,, | Performed by: FAMILY MEDICINE

## 2021-02-22 PROCEDURE — 1125F AMNT PAIN NOTED PAIN PRSNT: CPT | Mod: S$GLB,,, | Performed by: FAMILY MEDICINE

## 2021-02-22 RX ORDER — CETIRIZINE HYDROCHLORIDE 10 MG/1
10 TABLET ORAL DAILY
Qty: 90 TABLET | Refills: 3 | Status: SHIPPED | OUTPATIENT
Start: 2021-02-22 | End: 2023-01-03

## 2021-03-04 ENCOUNTER — HOSPITAL ENCOUNTER (OUTPATIENT)
Dept: RADIOLOGY | Facility: HOSPITAL | Age: 56
Discharge: HOME | End: 2021-03-04
Attending: INTERNAL MEDICINE
Payer: COMMERCIAL

## 2021-03-04 DIAGNOSIS — C50.912 MALIGNANT NEOPLASM OF LEFT BREAST IN FEMALE, ESTROGEN RECEPTOR POSITIVE, UNSPECIFIED SITE OF BREAST: ICD-10-CM

## 2021-03-04 DIAGNOSIS — Z79.811 LONG TERM (CURRENT) USE OF AROMATASE INHIBITORS: ICD-10-CM

## 2021-03-04 DIAGNOSIS — Z17.0 MALIGNANT NEOPLASM OF LEFT BREAST IN FEMALE, ESTROGEN RECEPTOR POSITIVE, UNSPECIFIED SITE OF BREAST: ICD-10-CM

## 2021-03-04 PROCEDURE — 77080 DXA BONE DENSITY AXIAL: CPT | Mod: TC,PO

## 2021-04-08 ENCOUNTER — OFFICE VISIT (OUTPATIENT)
Dept: FAMILY MEDICINE | Facility: CLINIC | Age: 56
End: 2021-04-08
Payer: COMMERCIAL

## 2021-04-08 VITALS
HEART RATE: 86 BPM | TEMPERATURE: 99 F | OXYGEN SATURATION: 98 % | WEIGHT: 279.75 LBS | HEIGHT: 67 IN | DIASTOLIC BLOOD PRESSURE: 68 MMHG | BODY MASS INDEX: 43.91 KG/M2 | SYSTOLIC BLOOD PRESSURE: 114 MMHG

## 2021-04-08 DIAGNOSIS — Z13.1 SCREENING FOR DIABETES MELLITUS: ICD-10-CM

## 2021-04-08 DIAGNOSIS — Z13.6 ENCOUNTER FOR LIPID SCREENING FOR CARDIOVASCULAR DISEASE: ICD-10-CM

## 2021-04-08 DIAGNOSIS — Z13.220 ENCOUNTER FOR LIPID SCREENING FOR CARDIOVASCULAR DISEASE: ICD-10-CM

## 2021-04-08 DIAGNOSIS — Z00.00 WELLNESS EXAMINATION: Primary | ICD-10-CM

## 2021-04-08 PROCEDURE — 99396 PR PREVENTIVE VISIT,EST,40-64: ICD-10-PCS | Mod: S$GLB,,, | Performed by: FAMILY MEDICINE

## 2021-04-08 PROCEDURE — 99999 PR PBB SHADOW E&M-EST. PATIENT-LVL III: CPT | Mod: PBBFAC,,, | Performed by: FAMILY MEDICINE

## 2021-04-08 PROCEDURE — 99396 PREV VISIT EST AGE 40-64: CPT | Mod: S$GLB,,, | Performed by: FAMILY MEDICINE

## 2021-04-08 PROCEDURE — 1125F AMNT PAIN NOTED PAIN PRSNT: CPT | Mod: S$GLB,,, | Performed by: FAMILY MEDICINE

## 2021-04-08 PROCEDURE — 3008F PR BODY MASS INDEX (BMI) DOCUMENTED: ICD-10-PCS | Mod: CPTII,S$GLB,, | Performed by: FAMILY MEDICINE

## 2021-04-08 PROCEDURE — 1125F PR PAIN SEVERITY QUANTIFIED, PAIN PRESENT: ICD-10-PCS | Mod: S$GLB,,, | Performed by: FAMILY MEDICINE

## 2021-04-08 PROCEDURE — 3008F BODY MASS INDEX DOCD: CPT | Mod: CPTII,S$GLB,, | Performed by: FAMILY MEDICINE

## 2021-04-08 PROCEDURE — 99999 PR PBB SHADOW E&M-EST. PATIENT-LVL III: ICD-10-PCS | Mod: PBBFAC,,, | Performed by: FAMILY MEDICINE

## 2021-04-08 RX ORDER — MELOXICAM 15 MG/1
15 TABLET ORAL DAILY PRN
COMMUNITY
Start: 2021-03-23 | End: 2023-12-14

## 2021-04-14 ENCOUNTER — LAB VISIT (OUTPATIENT)
Dept: LAB | Facility: HOSPITAL | Age: 56
End: 2021-04-14
Attending: FAMILY MEDICINE
Payer: COMMERCIAL

## 2021-04-14 DIAGNOSIS — Z13.1 SCREENING FOR DIABETES MELLITUS: ICD-10-CM

## 2021-04-14 DIAGNOSIS — Z13.6 ENCOUNTER FOR LIPID SCREENING FOR CARDIOVASCULAR DISEASE: ICD-10-CM

## 2021-04-14 DIAGNOSIS — Z13.220 ENCOUNTER FOR LIPID SCREENING FOR CARDIOVASCULAR DISEASE: ICD-10-CM

## 2021-04-14 LAB
CHOLEST SERPL-MCNC: 211 MG/DL (ref 120–199)
CHOLEST/HDLC SERPL: 5.4 {RATIO} (ref 2–5)
ESTIMATED AVG GLUCOSE: 105 MG/DL (ref 68–131)
HBA1C MFR BLD: 5.3 % (ref 4–5.6)
HDLC SERPL-MCNC: 39 MG/DL (ref 40–75)
HDLC SERPL: 18.5 % (ref 20–50)
LDLC SERPL CALC-MCNC: 141.2 MG/DL (ref 63–159)
NONHDLC SERPL-MCNC: 172 MG/DL
TRIGL SERPL-MCNC: 154 MG/DL (ref 30–150)

## 2021-04-14 PROCEDURE — 36415 COLL VENOUS BLD VENIPUNCTURE: CPT | Mod: PO | Performed by: FAMILY MEDICINE

## 2021-04-14 PROCEDURE — 83036 HEMOGLOBIN GLYCOSYLATED A1C: CPT | Performed by: FAMILY MEDICINE

## 2021-04-14 PROCEDURE — 80061 LIPID PANEL: CPT | Performed by: FAMILY MEDICINE

## 2021-04-19 ENCOUNTER — TELEPHONE (OUTPATIENT)
Dept: FAMILY MEDICINE | Facility: CLINIC | Age: 56
End: 2021-04-19

## 2021-05-07 ENCOUNTER — HOSPITAL ENCOUNTER (OUTPATIENT)
Dept: RADIOLOGY | Facility: HOSPITAL | Age: 56
Discharge: HOME | End: 2021-05-07
Attending: INTERNAL MEDICINE
Payer: COMMERCIAL

## 2021-05-07 DIAGNOSIS — R91.1 SOLITARY PULMONARY NODULE: ICD-10-CM

## 2021-05-07 PROCEDURE — 71250 CT THORAX DX C-: CPT | Mod: TC,PO

## 2021-06-25 ENCOUNTER — PATIENT MESSAGE (OUTPATIENT)
Dept: FAMILY MEDICINE | Facility: CLINIC | Age: 56
End: 2021-06-25

## 2021-06-25 DIAGNOSIS — E66.9 OBESITY, UNSPECIFIED CLASSIFICATION, UNSPECIFIED OBESITY TYPE, UNSPECIFIED WHETHER SERIOUS COMORBIDITY PRESENT: Primary | ICD-10-CM

## 2021-08-14 ENCOUNTER — HOSPITAL ENCOUNTER (EMERGENCY)
Facility: HOSPITAL | Age: 56
Discharge: HOME | End: 2021-08-14
Attending: EMERGENCY MEDICINE
Payer: COMMERCIAL

## 2021-08-14 VITALS
OXYGEN SATURATION: 99 % | DIASTOLIC BLOOD PRESSURE: 83 MMHG | HEIGHT: 67 IN | RESPIRATION RATE: 18 BRPM | TEMPERATURE: 99 F | WEIGHT: 280 LBS | HEART RATE: 69 BPM | SYSTOLIC BLOOD PRESSURE: 139 MMHG | BODY MASS INDEX: 43.95 KG/M2

## 2021-08-14 DIAGNOSIS — S99.922A INJURY OF LEFT FOOT, INITIAL ENCOUNTER: Primary | ICD-10-CM

## 2021-08-14 DIAGNOSIS — S90.32XA CONTUSION OF LEFT FOOT, INITIAL ENCOUNTER: ICD-10-CM

## 2021-08-14 DIAGNOSIS — T14.90XA INJURY: ICD-10-CM

## 2021-08-14 PROCEDURE — 25000003 PHARM REV CODE 250: Performed by: NURSE PRACTITIONER

## 2021-08-14 PROCEDURE — 90715 TDAP VACCINE 7 YRS/> IM: CPT | Performed by: NURSE PRACTITIONER

## 2021-08-14 PROCEDURE — 90471 IMMUNIZATION ADMIN: CPT | Performed by: NURSE PRACTITIONER

## 2021-08-14 PROCEDURE — 63600175 PHARM REV CODE 636 W HCPCS: Performed by: NURSE PRACTITIONER

## 2021-08-14 PROCEDURE — 99284 EMERGENCY DEPT VISIT MOD MDM: CPT

## 2021-08-14 RX ORDER — HYDROCODONE BITARTRATE AND ACETAMINOPHEN 5; 325 MG/1; MG/1
1 TABLET ORAL
Status: COMPLETED | OUTPATIENT
Start: 2021-08-14 | End: 2021-08-14

## 2021-08-14 RX ADMIN — HYDROCODONE BITARTRATE AND ACETAMINOPHEN 1 TABLET: 5; 325 TABLET ORAL at 06:08

## 2021-08-14 RX ADMIN — CLOSTRIDIUM TETANI TOXOID ANTIGEN (FORMALDEHYDE INACTIVATED), CORYNEBACTERIUM DIPHTHERIAE TOXOID ANTIGEN (FORMALDEHYDE INACTIVATED), BORDETELLA PERTUSSIS TOXOID ANTIGEN (GLUTARALDEHYDE INACTIVATED), BORDETELLA PERTUSSIS FILAMENTOUS HEMAGGLUTININ ANTIGEN (FORMALDEHYDE INACTIVATED), BORDETELLA PERTUSSIS PERTACTIN ANTIGEN, AND BORDETELLA PERTUSSIS FIMBRIAE 2/3 ANTIGEN 0.5 ML: 5; 2; 2.5; 5; 3; 5 INJECTION, SUSPENSION INTRAMUSCULAR at 06:08

## 2021-08-16 ENCOUNTER — TELEPHONE (OUTPATIENT)
Dept: PODIATRY | Facility: CLINIC | Age: 56
End: 2021-08-16

## 2021-08-17 ENCOUNTER — OFFICE VISIT (OUTPATIENT)
Dept: PODIATRY | Facility: CLINIC | Age: 56
End: 2021-08-17
Payer: COMMERCIAL

## 2021-08-17 VITALS
SYSTOLIC BLOOD PRESSURE: 137 MMHG | HEIGHT: 67 IN | WEIGHT: 291 LBS | OXYGEN SATURATION: 98 % | BODY MASS INDEX: 45.67 KG/M2 | RESPIRATION RATE: 18 BRPM | HEART RATE: 72 BPM | DIASTOLIC BLOOD PRESSURE: 80 MMHG

## 2021-08-17 DIAGNOSIS — S90.32XD CONTUSION OF LEFT FOOT, SUBSEQUENT ENCOUNTER: ICD-10-CM

## 2021-08-17 DIAGNOSIS — M79.672 LEFT FOOT PAIN: ICD-10-CM

## 2021-08-17 DIAGNOSIS — S92.515A CLOSED NONDISPLACED FRACTURE OF PROXIMAL PHALANX OF LESSER TOE OF LEFT FOOT, INITIAL ENCOUNTER: Primary | ICD-10-CM

## 2021-08-17 DIAGNOSIS — S99.922D INJURY OF LEFT FOOT, SUBSEQUENT ENCOUNTER: ICD-10-CM

## 2021-08-17 PROCEDURE — 1160F RVW MEDS BY RX/DR IN RCRD: CPT | Mod: CPTII,S$GLB,, | Performed by: PODIATRIST

## 2021-08-17 PROCEDURE — 1160F PR REVIEW ALL MEDS BY PRESCRIBER/CLIN PHARMACIST DOCUMENTED: ICD-10-PCS | Mod: CPTII,S$GLB,, | Performed by: PODIATRIST

## 2021-08-17 PROCEDURE — 3075F PR MOST RECENT SYSTOLIC BLOOD PRESS GE 130-139MM HG: ICD-10-PCS | Mod: CPTII,S$GLB,, | Performed by: PODIATRIST

## 2021-08-17 PROCEDURE — 3044F PR MOST RECENT HEMOGLOBIN A1C LEVEL <7.0%: ICD-10-PCS | Mod: CPTII,S$GLB,, | Performed by: PODIATRIST

## 2021-08-17 PROCEDURE — 1159F MED LIST DOCD IN RCRD: CPT | Mod: CPTII,S$GLB,, | Performed by: PODIATRIST

## 2021-08-17 PROCEDURE — 99203 PR OFFICE/OUTPT VISIT, NEW, LEVL III, 30-44 MIN: ICD-10-PCS | Mod: S$GLB,,, | Performed by: PODIATRIST

## 2021-08-17 PROCEDURE — 3008F BODY MASS INDEX DOCD: CPT | Mod: CPTII,S$GLB,, | Performed by: PODIATRIST

## 2021-08-17 PROCEDURE — 3044F HG A1C LEVEL LT 7.0%: CPT | Mod: CPTII,S$GLB,, | Performed by: PODIATRIST

## 2021-08-17 PROCEDURE — 1159F PR MEDICATION LIST DOCUMENTED IN MEDICAL RECORD: ICD-10-PCS | Mod: CPTII,S$GLB,, | Performed by: PODIATRIST

## 2021-08-17 PROCEDURE — 1125F PR PAIN SEVERITY QUANTIFIED, PAIN PRESENT: ICD-10-PCS | Mod: CPTII,S$GLB,, | Performed by: PODIATRIST

## 2021-08-17 PROCEDURE — 99203 OFFICE O/P NEW LOW 30 MIN: CPT | Mod: S$GLB,,, | Performed by: PODIATRIST

## 2021-08-17 PROCEDURE — 3008F PR BODY MASS INDEX (BMI) DOCUMENTED: ICD-10-PCS | Mod: CPTII,S$GLB,, | Performed by: PODIATRIST

## 2021-08-17 PROCEDURE — 3079F PR MOST RECENT DIASTOLIC BLOOD PRESSURE 80-89 MM HG: ICD-10-PCS | Mod: CPTII,S$GLB,, | Performed by: PODIATRIST

## 2021-08-17 PROCEDURE — 3075F SYST BP GE 130 - 139MM HG: CPT | Mod: CPTII,S$GLB,, | Performed by: PODIATRIST

## 2021-08-17 PROCEDURE — 1125F AMNT PAIN NOTED PAIN PRSNT: CPT | Mod: CPTII,S$GLB,, | Performed by: PODIATRIST

## 2021-08-17 PROCEDURE — 3079F DIAST BP 80-89 MM HG: CPT | Mod: CPTII,S$GLB,, | Performed by: PODIATRIST

## 2021-08-24 ENCOUNTER — OFFICE VISIT (OUTPATIENT)
Dept: OPTOMETRY | Facility: CLINIC | Age: 56
End: 2021-08-24
Payer: COMMERCIAL

## 2021-08-24 DIAGNOSIS — H10.829 ROSACEA BLEPHAROCONJUNCTIVITIS: ICD-10-CM

## 2021-08-24 DIAGNOSIS — H00.025 HORDEOLUM INTERNUM OF LEFT LOWER EYELID: Primary | ICD-10-CM

## 2021-08-24 PROCEDURE — 1159F MED LIST DOCD IN RCRD: CPT | Mod: CPTII,S$GLB,, | Performed by: OPTOMETRIST

## 2021-08-24 PROCEDURE — 1125F AMNT PAIN NOTED PAIN PRSNT: CPT | Mod: CPTII,S$GLB,, | Performed by: OPTOMETRIST

## 2021-08-24 PROCEDURE — 1159F PR MEDICATION LIST DOCUMENTED IN MEDICAL RECORD: ICD-10-PCS | Mod: CPTII,S$GLB,, | Performed by: OPTOMETRIST

## 2021-08-24 PROCEDURE — 99999 PR PBB SHADOW E&M-EST. PATIENT-LVL III: ICD-10-PCS | Mod: PBBFAC,,, | Performed by: OPTOMETRIST

## 2021-08-24 PROCEDURE — 3044F PR MOST RECENT HEMOGLOBIN A1C LEVEL <7.0%: ICD-10-PCS | Mod: CPTII,S$GLB,, | Performed by: OPTOMETRIST

## 2021-08-24 PROCEDURE — 3044F HG A1C LEVEL LT 7.0%: CPT | Mod: CPTII,S$GLB,, | Performed by: OPTOMETRIST

## 2021-08-24 PROCEDURE — 92002 INTRM OPH EXAM NEW PATIENT: CPT | Mod: S$GLB,,, | Performed by: OPTOMETRIST

## 2021-08-24 PROCEDURE — 92002 PR EYE EXAM, NEW PATIENT,INTERMED: ICD-10-PCS | Mod: S$GLB,,, | Performed by: OPTOMETRIST

## 2021-08-24 PROCEDURE — 99999 PR PBB SHADOW E&M-EST. PATIENT-LVL III: CPT | Mod: PBBFAC,,, | Performed by: OPTOMETRIST

## 2021-08-24 PROCEDURE — 1125F PR PAIN SEVERITY QUANTIFIED, PAIN PRESENT: ICD-10-PCS | Mod: CPTII,S$GLB,, | Performed by: OPTOMETRIST

## 2021-08-24 RX ORDER — ERYTHROMYCIN 5 MG/G
OINTMENT OPHTHALMIC
Qty: 3.5 G | Refills: 0 | Status: SHIPPED | OUTPATIENT
Start: 2021-08-24 | End: 2021-08-31

## 2021-08-24 RX ORDER — AZITHROMYCIN 250 MG/1
TABLET, FILM COATED ORAL
Qty: 6 TABLET | Refills: 0 | Status: SHIPPED | OUTPATIENT
Start: 2021-08-24 | End: 2021-08-29

## 2021-08-25 ENCOUNTER — TELEPHONE (OUTPATIENT)
Dept: OPTOMETRY | Facility: CLINIC | Age: 56
End: 2021-08-25

## 2021-08-28 NOTE — OP NOTE
PROCEDURE DATE: 11/26/2019    Procedure:   Interlaminar epidural steroid injection at L2-3 under fluoroscopic guidance.    Diagnosis: lUMBAR DISC DISPLACEMENT WITHOUT MYELOPATHY  pOSTOP DIAGNOSIS: sAME    Physician: Niko Meek M.D.    Medications injected:10 mg dexamethasone with 4 ml of preservative free NaCl    Local anesthetic injected:    Lidocaine 1% 2 ml total    Sedation Medications: RN IV sedation    Estimated blood loss:  None    Complications:  None    Technique:  Time-out taken to identify patient and procedure prior to starting the procedure.  With the patient laying in a prone position, the area was prepped and draped in the usual sterile fashion using ChloraPrep and a fenestrated drape.  After determining the target level with an AP fluoroscopic view, local anesthetic was given using a 25-gauge 1.5 inch needle by raising a wheal and then infiltrating toward the interlaminar entry space.  A 3.5inch 20-gauge Touhy needle was introduced under AP fluoroscopic guidance to the interlaminar space of L2-3. Once the trajectory was established, the needle was visualized in the lateral view and advanced using loss of resistance technique. Once in the desired position, 1ml contrast was injected to confirm placement and there was no vascular uptake nor intrathecal spread.  The medication was then injected slowly. The patient tolerated the procedure well.      The patient was monitored after the procedure.   They were given post-procedure and discharge instructions to follow at home.  The patient was discharged in a stable condition.      
show

## 2021-09-22 ENCOUNTER — NUTRITION (OUTPATIENT)
Dept: NUTRITION | Facility: HOSPITAL | Age: 56
End: 2021-09-22
Payer: COMMERCIAL

## 2021-09-22 DIAGNOSIS — E66.9 OBESITY, UNSPECIFIED CLASSIFICATION, UNSPECIFIED OBESITY TYPE, UNSPECIFIED WHETHER SERIOUS COMORBIDITY PRESENT: ICD-10-CM

## 2021-09-22 PROCEDURE — 97802 MEDICAL NUTRITION INDIV IN: CPT

## 2021-09-28 ENCOUNTER — OFFICE VISIT (OUTPATIENT)
Dept: PODIATRY | Facility: CLINIC | Age: 56
End: 2021-09-28
Payer: COMMERCIAL

## 2021-09-28 ENCOUNTER — HOSPITAL ENCOUNTER (OUTPATIENT)
Dept: RADIOLOGY | Facility: CLINIC | Age: 56
Discharge: HOME | End: 2021-09-28
Attending: PODIATRIST
Payer: COMMERCIAL

## 2021-09-28 VITALS — HEIGHT: 67 IN | HEART RATE: 81 BPM | BODY MASS INDEX: 45.36 KG/M2 | WEIGHT: 289 LBS | OXYGEN SATURATION: 97 %

## 2021-09-28 DIAGNOSIS — S92.515A CLOSED NONDISPLACED FRACTURE OF PROXIMAL PHALANX OF LESSER TOE OF LEFT FOOT, INITIAL ENCOUNTER: Primary | ICD-10-CM

## 2021-09-28 DIAGNOSIS — M79.672 LEFT FOOT PAIN: ICD-10-CM

## 2021-09-28 PROCEDURE — 1159F PR MEDICATION LIST DOCUMENTED IN MEDICAL RECORD: ICD-10-PCS | Mod: CPTII,S$GLB,, | Performed by: PODIATRIST

## 2021-09-28 PROCEDURE — 3008F BODY MASS INDEX DOCD: CPT | Mod: CPTII,S$GLB,, | Performed by: PODIATRIST

## 2021-09-28 PROCEDURE — 3044F PR MOST RECENT HEMOGLOBIN A1C LEVEL <7.0%: ICD-10-PCS | Mod: CPTII,S$GLB,, | Performed by: PODIATRIST

## 2021-09-28 PROCEDURE — 73630 X-RAY EXAM OF FOOT: CPT | Mod: LT,S$GLB,, | Performed by: RADIOLOGY

## 2021-09-28 PROCEDURE — 1159F MED LIST DOCD IN RCRD: CPT | Mod: CPTII,S$GLB,, | Performed by: PODIATRIST

## 2021-09-28 PROCEDURE — 3044F HG A1C LEVEL LT 7.0%: CPT | Mod: CPTII,S$GLB,, | Performed by: PODIATRIST

## 2021-09-28 PROCEDURE — 1160F PR REVIEW ALL MEDS BY PRESCRIBER/CLIN PHARMACIST DOCUMENTED: ICD-10-PCS | Mod: CPTII,S$GLB,, | Performed by: PODIATRIST

## 2021-09-28 PROCEDURE — 1160F RVW MEDS BY RX/DR IN RCRD: CPT | Mod: CPTII,S$GLB,, | Performed by: PODIATRIST

## 2021-09-28 PROCEDURE — 73630 XR FOOT COMPLETE 3 VIEW LEFT: ICD-10-PCS | Mod: LT,S$GLB,, | Performed by: RADIOLOGY

## 2021-09-28 PROCEDURE — 99213 OFFICE O/P EST LOW 20 MIN: CPT | Mod: S$GLB,,, | Performed by: PODIATRIST

## 2021-09-28 PROCEDURE — 3008F PR BODY MASS INDEX (BMI) DOCUMENTED: ICD-10-PCS | Mod: CPTII,S$GLB,, | Performed by: PODIATRIST

## 2021-09-28 PROCEDURE — 99213 PR OFFICE/OUTPT VISIT, EST, LEVL III, 20-29 MIN: ICD-10-PCS | Mod: S$GLB,,, | Performed by: PODIATRIST

## 2021-10-05 ENCOUNTER — OFFICE VISIT (OUTPATIENT)
Dept: OPTOMETRY | Facility: CLINIC | Age: 56
End: 2021-10-05
Payer: COMMERCIAL

## 2021-10-05 ENCOUNTER — OFFICE VISIT (OUTPATIENT)
Dept: FAMILY MEDICINE | Facility: CLINIC | Age: 56
End: 2021-10-05
Payer: COMMERCIAL

## 2021-10-05 ENCOUNTER — TELEPHONE (OUTPATIENT)
Dept: FAMILY MEDICINE | Facility: CLINIC | Age: 56
End: 2021-10-05

## 2021-10-05 ENCOUNTER — HOSPITAL ENCOUNTER (OUTPATIENT)
Dept: RADIOLOGY | Facility: CLINIC | Age: 56
Discharge: HOME | End: 2021-10-05
Attending: STUDENT IN AN ORGANIZED HEALTH CARE EDUCATION/TRAINING PROGRAM
Payer: COMMERCIAL

## 2021-10-05 VITALS
BODY MASS INDEX: 45.99 KG/M2 | WEIGHT: 293 LBS | DIASTOLIC BLOOD PRESSURE: 86 MMHG | HEART RATE: 70 BPM | RESPIRATION RATE: 18 BRPM | OXYGEN SATURATION: 99 % | HEIGHT: 67 IN | SYSTOLIC BLOOD PRESSURE: 112 MMHG

## 2021-10-05 DIAGNOSIS — S63.614D SPRAIN OF RIGHT RING FINGER, UNSPECIFIED SITE OF DIGIT, SUBSEQUENT ENCOUNTER: Primary | ICD-10-CM
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
## 2021-10-05 DIAGNOSIS — H10.829 ROSACEA BLEPHAROCONJUNCTIVITIS: ICD-10-CM

## 2021-10-05 DIAGNOSIS — S62.629A CLOSED AVULSION FRACTURE OF MIDDLE PHALANX OF FINGER, INITIAL ENCOUNTER: ICD-10-CM

## 2021-10-05 DIAGNOSIS — H52.03 HYPEROPIA WITH ASTIGMATISM AND PRESBYOPIA, BILATERAL: ICD-10-CM

## 2021-10-05 DIAGNOSIS — H52.203 HYPEROPIA WITH ASTIGMATISM AND PRESBYOPIA, BILATERAL: ICD-10-CM

## 2021-10-05 DIAGNOSIS — S63.614D SPRAIN OF RIGHT RING FINGER, UNSPECIFIED SITE OF DIGIT, SUBSEQUENT ENCOUNTER: ICD-10-CM

## 2021-10-05 DIAGNOSIS — H25.13 NUCLEAR SCLEROSIS, BILATERAL: Primary | ICD-10-CM

## 2021-10-05 DIAGNOSIS — H43.393 VITREOUS FLOATERS, BILATERAL: ICD-10-CM

## 2021-10-05 DIAGNOSIS — Z13.5 GLAUCOMA SCREENING: ICD-10-CM

## 2021-10-05 DIAGNOSIS — H52.4 HYPEROPIA WITH ASTIGMATISM AND PRESBYOPIA, BILATERAL: ICD-10-CM

## 2021-10-05 PROCEDURE — 3079F DIAST BP 80-89 MM HG: CPT | Mod: CPTII,S$GLB,, | Performed by: STUDENT IN AN ORGANIZED HEALTH CARE EDUCATION/TRAINING PROGRAM

## 2021-10-05 PROCEDURE — 3008F PR BODY MASS INDEX (BMI) DOCUMENTED: ICD-10-PCS | Mod: CPTII,S$GLB,, | Performed by: STUDENT IN AN ORGANIZED HEALTH CARE EDUCATION/TRAINING PROGRAM

## 2021-10-05 PROCEDURE — 99999 PR PBB SHADOW E&M-EST. PATIENT-LVL III: CPT | Mod: PBBFAC,,, | Performed by: OPTOMETRIST

## 2021-10-05 PROCEDURE — 92015 DETERMINE REFRACTIVE STATE: CPT | Mod: S$GLB,,, | Performed by: OPTOMETRIST

## 2021-10-05 PROCEDURE — 92014 PR EYE EXAM, EST PATIENT,COMPREHESV: ICD-10-PCS | Mod: S$GLB,,, | Performed by: OPTOMETRIST

## 2021-10-05 PROCEDURE — 92015 PR REFRACTION: ICD-10-PCS | Mod: S$GLB,,, | Performed by: OPTOMETRIST

## 2021-10-05 PROCEDURE — 3074F SYST BP LT 130 MM HG: CPT | Mod: CPTII,S$GLB,, | Performed by: STUDENT IN AN ORGANIZED HEALTH CARE EDUCATION/TRAINING PROGRAM

## 2021-10-05 PROCEDURE — 1159F PR MEDICATION LIST DOCUMENTED IN MEDICAL RECORD: ICD-10-PCS | Mod: CPTII,S$GLB,, | Performed by: OPTOMETRIST

## 2021-10-05 PROCEDURE — 3074F PR MOST RECENT SYSTOLIC BLOOD PRESSURE < 130 MM HG: ICD-10-PCS | Mod: CPTII,S$GLB,, | Performed by: STUDENT IN AN ORGANIZED HEALTH CARE EDUCATION/TRAINING PROGRAM

## 2021-10-05 PROCEDURE — 3044F HG A1C LEVEL LT 7.0%: CPT | Mod: CPTII,S$GLB,, | Performed by: STUDENT IN AN ORGANIZED HEALTH CARE EDUCATION/TRAINING PROGRAM

## 2021-10-05 PROCEDURE — 99999 PR PBB SHADOW E&M-EST. PATIENT-LVL V: ICD-10-PCS | Mod: PBBFAC,,, | Performed by: STUDENT IN AN ORGANIZED HEALTH CARE EDUCATION/TRAINING PROGRAM

## 2021-10-05 PROCEDURE — 73130 X-RAY EXAM OF HAND: CPT | Mod: TC,FY,PO,RT

## 2021-10-05 PROCEDURE — 73130 XR HAND COMPLETE 3 VIEW RIGHT: ICD-10-PCS | Mod: 26,RT,S$GLB, | Performed by: RADIOLOGY

## 2021-10-05 PROCEDURE — 99213 PR OFFICE/OUTPT VISIT, EST, LEVL III, 20-29 MIN: ICD-10-PCS | Mod: S$GLB,,, | Performed by: STUDENT IN AN ORGANIZED HEALTH CARE EDUCATION/TRAINING PROGRAM

## 2021-10-05 PROCEDURE — 99999 PR PBB SHADOW E&M-EST. PATIENT-LVL V: CPT | Mod: PBBFAC,,, | Performed by: STUDENT IN AN ORGANIZED HEALTH CARE EDUCATION/TRAINING PROGRAM

## 2021-10-05 PROCEDURE — 73130 X-RAY EXAM OF HAND: CPT | Mod: 26,RT,S$GLB, | Performed by: RADIOLOGY

## 2021-10-05 PROCEDURE — 3008F BODY MASS INDEX DOCD: CPT | Mod: CPTII,S$GLB,, | Performed by: STUDENT IN AN ORGANIZED HEALTH CARE EDUCATION/TRAINING PROGRAM

## 2021-10-05 PROCEDURE — 92014 COMPRE OPH EXAM EST PT 1/>: CPT | Mod: S$GLB,,, | Performed by: OPTOMETRIST

## 2021-10-05 PROCEDURE — 3044F HG A1C LEVEL LT 7.0%: CPT | Mod: CPTII,S$GLB,, | Performed by: OPTOMETRIST

## 2021-10-05 PROCEDURE — 99213 OFFICE O/P EST LOW 20 MIN: CPT | Mod: S$GLB,,, | Performed by: STUDENT IN AN ORGANIZED HEALTH CARE EDUCATION/TRAINING PROGRAM

## 2021-10-05 PROCEDURE — 3044F PR MOST RECENT HEMOGLOBIN A1C LEVEL <7.0%: ICD-10-PCS | Mod: CPTII,S$GLB,, | Performed by: STUDENT IN AN ORGANIZED HEALTH CARE EDUCATION/TRAINING PROGRAM

## 2021-10-05 PROCEDURE — 3044F PR MOST RECENT HEMOGLOBIN A1C LEVEL <7.0%: ICD-10-PCS | Mod: CPTII,S$GLB,, | Performed by: OPTOMETRIST

## 2021-10-05 PROCEDURE — 3079F PR MOST RECENT DIASTOLIC BLOOD PRESSURE 80-89 MM HG: ICD-10-PCS | Mod: CPTII,S$GLB,, | Performed by: STUDENT IN AN ORGANIZED HEALTH CARE EDUCATION/TRAINING PROGRAM

## 2021-10-05 PROCEDURE — 99999 PR PBB SHADOW E&M-EST. PATIENT-LVL III: ICD-10-PCS | Mod: PBBFAC,,, | Performed by: OPTOMETRIST

## 2021-10-05 PROCEDURE — 1159F MED LIST DOCD IN RCRD: CPT | Mod: CPTII,S$GLB,, | Performed by: OPTOMETRIST

## 2021-10-06 ENCOUNTER — OFFICE VISIT (OUTPATIENT)
Dept: ORTHOPEDICS | Facility: CLINIC | Age: 56
End: 2021-10-06
Payer: COMMERCIAL

## 2021-10-06 ENCOUNTER — PATIENT OUTREACH (OUTPATIENT)
Dept: ADMINISTRATIVE | Facility: OTHER | Age: 56
End: 2021-10-06

## 2021-10-06 VITALS — HEIGHT: 67 IN | WEIGHT: 293 LBS | RESPIRATION RATE: 16 BRPM | BODY MASS INDEX: 45.99 KG/M2

## 2021-10-06 DIAGNOSIS — S62.629A CLOSED AVULSION FRACTURE OF MIDDLE PHALANX OF FINGER, INITIAL ENCOUNTER: ICD-10-CM

## 2021-10-06 PROCEDURE — 3044F HG A1C LEVEL LT 7.0%: CPT | Mod: CPTII,S$GLB,, | Performed by: ORTHOPAEDIC SURGERY

## 2021-10-06 PROCEDURE — 3008F PR BODY MASS INDEX (BMI) DOCUMENTED: ICD-10-PCS | Mod: CPTII,S$GLB,, | Performed by: ORTHOPAEDIC SURGERY

## 2021-10-06 PROCEDURE — 99999 PR PBB SHADOW E&M-EST. PATIENT-LVL III: CPT | Mod: PBBFAC,,, | Performed by: ORTHOPAEDIC SURGERY

## 2021-10-06 PROCEDURE — 99203 OFFICE O/P NEW LOW 30 MIN: CPT | Mod: S$GLB,,, | Performed by: ORTHOPAEDIC SURGERY

## 2021-10-06 PROCEDURE — 3044F PR MOST RECENT HEMOGLOBIN A1C LEVEL <7.0%: ICD-10-PCS | Mod: CPTII,S$GLB,, | Performed by: ORTHOPAEDIC SURGERY

## 2021-10-06 PROCEDURE — 99999 PR PBB SHADOW E&M-EST. PATIENT-LVL III: ICD-10-PCS | Mod: PBBFAC,,, | Performed by: ORTHOPAEDIC SURGERY

## 2021-10-06 PROCEDURE — 3008F BODY MASS INDEX DOCD: CPT | Mod: CPTII,S$GLB,, | Performed by: ORTHOPAEDIC SURGERY

## 2021-10-06 PROCEDURE — 99203 PR OFFICE/OUTPT VISIT, NEW, LEVL III, 30-44 MIN: ICD-10-PCS | Mod: S$GLB,,, | Performed by: ORTHOPAEDIC SURGERY

## 2021-10-09 ENCOUNTER — IMMUNIZATION (OUTPATIENT)
Dept: FAMILY MEDICINE | Facility: CLINIC | Age: 56
End: 2021-10-09
Payer: COMMERCIAL

## 2021-10-09 PROCEDURE — 90686 FLU VACCINE (QUAD) GREATER THAN OR EQUAL TO 3YO PRESERVATIVE FREE IM: ICD-10-PCS | Mod: S$GLB,,, | Performed by: INTERNAL MEDICINE

## 2021-10-09 PROCEDURE — 90686 IIV4 VACC NO PRSV 0.5 ML IM: CPT | Mod: S$GLB,,, | Performed by: INTERNAL MEDICINE

## 2021-10-09 PROCEDURE — 90471 FLU VACCINE (QUAD) GREATER THAN OR EQUAL TO 3YO PRESERVATIVE FREE IM: ICD-10-PCS | Mod: S$GLB,,, | Performed by: INTERNAL MEDICINE

## 2021-10-09 PROCEDURE — 90471 IMMUNIZATION ADMIN: CPT | Mod: S$GLB,,, | Performed by: INTERNAL MEDICINE

## 2021-10-27 ENCOUNTER — HOSPITAL ENCOUNTER (OUTPATIENT)
Dept: RADIOLOGY | Facility: HOSPITAL | Age: 56
Discharge: HOME | End: 2021-10-27
Attending: ORTHOPAEDIC SURGERY
Payer: COMMERCIAL

## 2021-10-27 ENCOUNTER — OFFICE VISIT (OUTPATIENT)
Dept: ORTHOPEDICS | Facility: CLINIC | Age: 56
End: 2021-10-27
Payer: COMMERCIAL

## 2021-10-27 VITALS — BODY MASS INDEX: 45.99 KG/M2 | RESPIRATION RATE: 16 BRPM | WEIGHT: 293 LBS | HEIGHT: 67 IN

## 2021-10-27 DIAGNOSIS — S62.629A CLOSED AVULSION FRACTURE OF MIDDLE PHALANX OF FINGER, INITIAL ENCOUNTER: Primary | ICD-10-CM

## 2021-10-27 DIAGNOSIS — S62.629A CLOSED AVULSION FRACTURE OF MIDDLE PHALANX OF FINGER, INITIAL ENCOUNTER: ICD-10-CM

## 2021-10-27 PROCEDURE — 99213 OFFICE O/P EST LOW 20 MIN: CPT | Mod: S$GLB,,, | Performed by: ORTHOPAEDIC SURGERY

## 2021-10-27 PROCEDURE — 3008F BODY MASS INDEX DOCD: CPT | Mod: CPTII,S$GLB,, | Performed by: ORTHOPAEDIC SURGERY

## 2021-10-27 PROCEDURE — 3008F PR BODY MASS INDEX (BMI) DOCUMENTED: ICD-10-PCS | Mod: CPTII,S$GLB,, | Performed by: ORTHOPAEDIC SURGERY

## 2021-10-27 PROCEDURE — 73130 XR HAND COMPLETE 3 VIEW RIGHT: ICD-10-PCS | Mod: 26,RT,, | Performed by: RADIOLOGY

## 2021-10-27 PROCEDURE — 99999 PR PBB SHADOW E&M-EST. PATIENT-LVL III: ICD-10-PCS | Mod: PBBFAC,,, | Performed by: ORTHOPAEDIC SURGERY

## 2021-10-27 PROCEDURE — 99999 PR PBB SHADOW E&M-EST. PATIENT-LVL III: CPT | Mod: PBBFAC,,, | Performed by: ORTHOPAEDIC SURGERY

## 2021-10-27 PROCEDURE — 1159F MED LIST DOCD IN RCRD: CPT | Mod: CPTII,S$GLB,, | Performed by: ORTHOPAEDIC SURGERY

## 2021-10-27 PROCEDURE — 73130 X-RAY EXAM OF HAND: CPT | Mod: TC,PN,RT

## 2021-10-27 PROCEDURE — 73130 X-RAY EXAM OF HAND: CPT | Mod: 26,RT,, | Performed by: RADIOLOGY

## 2021-10-27 PROCEDURE — 99213 PR OFFICE/OUTPT VISIT, EST, LEVL III, 20-29 MIN: ICD-10-PCS | Mod: S$GLB,,, | Performed by: ORTHOPAEDIC SURGERY

## 2021-10-27 PROCEDURE — 3044F HG A1C LEVEL LT 7.0%: CPT | Mod: CPTII,S$GLB,, | Performed by: ORTHOPAEDIC SURGERY

## 2021-10-27 PROCEDURE — 1159F PR MEDICATION LIST DOCUMENTED IN MEDICAL RECORD: ICD-10-PCS | Mod: CPTII,S$GLB,, | Performed by: ORTHOPAEDIC SURGERY

## 2021-10-27 PROCEDURE — 3044F PR MOST RECENT HEMOGLOBIN A1C LEVEL <7.0%: ICD-10-PCS | Mod: CPTII,S$GLB,, | Performed by: ORTHOPAEDIC SURGERY

## 2021-11-10 ENCOUNTER — HOSPITAL ENCOUNTER (OUTPATIENT)
Dept: RADIOLOGY | Facility: HOSPITAL | Age: 56
Discharge: HOME | End: 2021-11-10
Attending: ORTHOPAEDIC SURGERY
Payer: COMMERCIAL

## 2021-11-10 ENCOUNTER — OFFICE VISIT (OUTPATIENT)
Dept: ORTHOPEDICS | Facility: CLINIC | Age: 56
End: 2021-11-10
Payer: COMMERCIAL

## 2021-11-10 VITALS — BODY MASS INDEX: 45.99 KG/M2 | RESPIRATION RATE: 16 BRPM | HEIGHT: 67 IN | WEIGHT: 293 LBS

## 2021-11-10 DIAGNOSIS — M79.641 RIGHT HAND PAIN: Primary | ICD-10-CM

## 2021-11-10 DIAGNOSIS — M79.641 RIGHT HAND PAIN: ICD-10-CM

## 2021-11-10 DIAGNOSIS — S62.629A CLOSED AVULSION FRACTURE OF MIDDLE PHALANX OF FINGER, INITIAL ENCOUNTER: Primary | ICD-10-CM

## 2021-11-10 PROCEDURE — 73130 XR HAND COMPLETE 3 VIEW RIGHT: ICD-10-PCS | Mod: 26,RT,, | Performed by: RADIOLOGY

## 2021-11-10 PROCEDURE — 99999 PR PBB SHADOW E&M-EST. PATIENT-LVL III: ICD-10-PCS | Mod: PBBFAC,,, | Performed by: ORTHOPAEDIC SURGERY

## 2021-11-10 PROCEDURE — 99213 PR OFFICE/OUTPT VISIT, EST, LEVL III, 20-29 MIN: ICD-10-PCS | Mod: S$GLB,,, | Performed by: ORTHOPAEDIC SURGERY

## 2021-11-10 PROCEDURE — 73130 X-RAY EXAM OF HAND: CPT | Mod: TC,PN,RT

## 2021-11-10 PROCEDURE — 99999 PR PBB SHADOW E&M-EST. PATIENT-LVL III: CPT | Mod: PBBFAC,,, | Performed by: ORTHOPAEDIC SURGERY

## 2021-11-10 PROCEDURE — 3044F HG A1C LEVEL LT 7.0%: CPT | Mod: CPTII,S$GLB,, | Performed by: ORTHOPAEDIC SURGERY

## 2021-11-10 PROCEDURE — 1159F MED LIST DOCD IN RCRD: CPT | Mod: CPTII,S$GLB,, | Performed by: ORTHOPAEDIC SURGERY

## 2021-11-10 PROCEDURE — 3008F BODY MASS INDEX DOCD: CPT | Mod: CPTII,S$GLB,, | Performed by: ORTHOPAEDIC SURGERY

## 2021-11-10 PROCEDURE — 73130 X-RAY EXAM OF HAND: CPT | Mod: 26,RT,, | Performed by: RADIOLOGY

## 2021-11-10 PROCEDURE — 3008F PR BODY MASS INDEX (BMI) DOCUMENTED: ICD-10-PCS | Mod: CPTII,S$GLB,, | Performed by: ORTHOPAEDIC SURGERY

## 2021-11-10 PROCEDURE — 1159F PR MEDICATION LIST DOCUMENTED IN MEDICAL RECORD: ICD-10-PCS | Mod: CPTII,S$GLB,, | Performed by: ORTHOPAEDIC SURGERY

## 2021-11-10 PROCEDURE — 3044F PR MOST RECENT HEMOGLOBIN A1C LEVEL <7.0%: ICD-10-PCS | Mod: CPTII,S$GLB,, | Performed by: ORTHOPAEDIC SURGERY

## 2021-11-10 PROCEDURE — 99213 OFFICE O/P EST LOW 20 MIN: CPT | Mod: S$GLB,,, | Performed by: ORTHOPAEDIC SURGERY

## 2021-11-30 ENCOUNTER — OFFICE VISIT (OUTPATIENT)
Dept: FAMILY MEDICINE | Facility: CLINIC | Age: 56
End: 2021-11-30
Payer: COMMERCIAL

## 2021-11-30 VITALS
DIASTOLIC BLOOD PRESSURE: 74 MMHG | OXYGEN SATURATION: 100 % | RESPIRATION RATE: 18 BRPM | HEART RATE: 76 BPM | BODY MASS INDEX: 45.67 KG/M2 | WEIGHT: 291 LBS | HEIGHT: 67 IN | SYSTOLIC BLOOD PRESSURE: 124 MMHG

## 2021-11-30 DIAGNOSIS — E66.01 CLASS 3 SEVERE OBESITY WITH SERIOUS COMORBIDITY AND BODY MASS INDEX (BMI) OF 45.0 TO 49.9 IN ADULT, UNSPECIFIED OBESITY TYPE: Primary | ICD-10-CM

## 2021-11-30 DIAGNOSIS — M46.06 SPINAL ENTHESOPATHY, LUMBAR REGION: ICD-10-CM

## 2021-11-30 PROCEDURE — 99213 PR OFFICE/OUTPT VISIT, EST, LEVL III, 20-29 MIN: ICD-10-PCS | Mod: S$GLB,,, | Performed by: STUDENT IN AN ORGANIZED HEALTH CARE EDUCATION/TRAINING PROGRAM

## 2021-11-30 PROCEDURE — 93005 ELECTROCARDIOGRAM TRACING: CPT | Mod: S$GLB,,, | Performed by: STUDENT IN AN ORGANIZED HEALTH CARE EDUCATION/TRAINING PROGRAM

## 2021-11-30 PROCEDURE — 99999 PR PBB SHADOW E&M-EST. PATIENT-LVL V: ICD-10-PCS | Mod: PBBFAC,,, | Performed by: STUDENT IN AN ORGANIZED HEALTH CARE EDUCATION/TRAINING PROGRAM

## 2021-11-30 PROCEDURE — 93010 EKG 12-LEAD: ICD-10-PCS | Mod: S$GLB,,, | Performed by: INTERNAL MEDICINE

## 2021-11-30 PROCEDURE — 93010 ELECTROCARDIOGRAM REPORT: CPT | Mod: S$GLB,,, | Performed by: INTERNAL MEDICINE

## 2021-11-30 PROCEDURE — 93005 EKG 12-LEAD: ICD-10-PCS | Mod: S$GLB,,, | Performed by: STUDENT IN AN ORGANIZED HEALTH CARE EDUCATION/TRAINING PROGRAM

## 2021-11-30 PROCEDURE — 99999 PR PBB SHADOW E&M-EST. PATIENT-LVL V: CPT | Mod: PBBFAC,,, | Performed by: STUDENT IN AN ORGANIZED HEALTH CARE EDUCATION/TRAINING PROGRAM

## 2021-11-30 PROCEDURE — 99213 OFFICE O/P EST LOW 20 MIN: CPT | Mod: S$GLB,,, | Performed by: STUDENT IN AN ORGANIZED HEALTH CARE EDUCATION/TRAINING PROGRAM

## 2021-11-30 RX ORDER — DIAZEPAM 10 MG/1
10 TABLET ORAL 2 TIMES DAILY PRN
COMMUNITY
Start: 2021-11-22 | End: 2021-12-10

## 2021-11-30 RX ORDER — PHENTERMINE HYDROCHLORIDE 37.5 MG/1
37.5 TABLET ORAL
Qty: 90 TABLET | Refills: 0 | Status: SHIPPED | OUTPATIENT
Start: 2021-11-30 | End: 2021-12-30

## 2021-12-01 ENCOUNTER — NUTRITION (OUTPATIENT)
Dept: NUTRITION | Facility: HOSPITAL | Age: 56
End: 2021-12-01
Payer: COMMERCIAL

## 2021-12-01 DIAGNOSIS — E66.9 OBESITY, UNSPECIFIED CLASSIFICATION, UNSPECIFIED OBESITY TYPE, UNSPECIFIED WHETHER SERIOUS COMORBIDITY PRESENT: Primary | ICD-10-CM

## 2021-12-01 PROCEDURE — 97803 MED NUTRITION INDIV SUBSEQ: CPT

## 2021-12-03 PROBLEM — M46.06 SPINAL ENTHESOPATHY, LUMBAR REGION: Status: ACTIVE | Noted: 2021-12-03

## 2021-12-10 ENCOUNTER — OFFICE VISIT (OUTPATIENT)
Dept: FAMILY MEDICINE | Facility: CLINIC | Age: 56
End: 2021-12-10
Payer: COMMERCIAL

## 2021-12-10 VITALS
WEIGHT: 291 LBS | HEIGHT: 67 IN | HEART RATE: 85 BPM | SYSTOLIC BLOOD PRESSURE: 118 MMHG | DIASTOLIC BLOOD PRESSURE: 80 MMHG | OXYGEN SATURATION: 98 % | BODY MASS INDEX: 45.67 KG/M2

## 2021-12-10 DIAGNOSIS — R22.31 MASS OF RIGHT HAND: Primary | ICD-10-CM

## 2021-12-10 PROCEDURE — 99999 PR PBB SHADOW E&M-EST. PATIENT-LVL IV: ICD-10-PCS | Mod: PBBFAC,,, | Performed by: STUDENT IN AN ORGANIZED HEALTH CARE EDUCATION/TRAINING PROGRAM

## 2021-12-10 PROCEDURE — 99213 OFFICE O/P EST LOW 20 MIN: CPT | Mod: S$GLB,,, | Performed by: STUDENT IN AN ORGANIZED HEALTH CARE EDUCATION/TRAINING PROGRAM

## 2021-12-10 PROCEDURE — 99999 PR PBB SHADOW E&M-EST. PATIENT-LVL IV: CPT | Mod: PBBFAC,,, | Performed by: STUDENT IN AN ORGANIZED HEALTH CARE EDUCATION/TRAINING PROGRAM

## 2021-12-10 PROCEDURE — 99213 PR OFFICE/OUTPT VISIT, EST, LEVL III, 20-29 MIN: ICD-10-PCS | Mod: S$GLB,,, | Performed by: STUDENT IN AN ORGANIZED HEALTH CARE EDUCATION/TRAINING PROGRAM

## 2021-12-16 ENCOUNTER — HOSPITAL ENCOUNTER (OUTPATIENT)
Dept: RADIOLOGY | Facility: HOSPITAL | Age: 56
Discharge: HOME | End: 2021-12-16
Attending: STUDENT IN AN ORGANIZED HEALTH CARE EDUCATION/TRAINING PROGRAM
Payer: COMMERCIAL

## 2021-12-16 DIAGNOSIS — R22.31 MASS OF RIGHT HAND: ICD-10-CM

## 2021-12-16 PROCEDURE — 73200 CT UPPER EXTREMITY W/O DYE: CPT | Mod: TC,RT

## 2021-12-16 PROCEDURE — 73200 CT UPPER EXTREMITY W/O DYE: CPT | Mod: 26,RT,, | Performed by: RADIOLOGY

## 2021-12-16 PROCEDURE — 73200 CT HAND WITHOUT CONTRAST RIGHT: ICD-10-PCS | Mod: 26,RT,, | Performed by: RADIOLOGY

## 2021-12-20 ENCOUNTER — TELEPHONE (OUTPATIENT)
Dept: ORTHOPEDICS | Facility: CLINIC | Age: 56
End: 2021-12-20
Payer: COMMERCIAL

## 2022-01-04 ENCOUNTER — OFFICE VISIT (OUTPATIENT)
Dept: ORTHOPEDICS | Facility: CLINIC | Age: 57
End: 2022-01-04
Payer: COMMERCIAL

## 2022-01-04 ENCOUNTER — HOSPITAL ENCOUNTER (OUTPATIENT)
Dept: RADIOLOGY | Facility: HOSPITAL | Age: 57
Discharge: HOME | End: 2022-01-04
Attending: ORTHOPAEDIC SURGERY
Payer: COMMERCIAL

## 2022-01-04 VITALS — HEIGHT: 67 IN | WEIGHT: 291 LBS | RESPIRATION RATE: 16 BRPM | BODY MASS INDEX: 45.67 KG/M2

## 2022-01-04 DIAGNOSIS — M79.641 RIGHT HAND PAIN: ICD-10-CM

## 2022-01-04 DIAGNOSIS — R22.31 MASS OF RIGHT HAND: Primary | ICD-10-CM

## 2022-01-04 DIAGNOSIS — M79.641 RIGHT HAND PAIN: Primary | ICD-10-CM

## 2022-01-04 PROCEDURE — 73130 X-RAY EXAM OF HAND: CPT | Mod: TC,PN,RT

## 2022-01-04 PROCEDURE — 1160F RVW MEDS BY RX/DR IN RCRD: CPT | Mod: CPTII,S$GLB,, | Performed by: ORTHOPAEDIC SURGERY

## 2022-01-04 PROCEDURE — 3008F PR BODY MASS INDEX (BMI) DOCUMENTED: ICD-10-PCS | Mod: CPTII,S$GLB,, | Performed by: ORTHOPAEDIC SURGERY

## 2022-01-04 PROCEDURE — 73130 XR HAND COMPLETE 3 VIEW RIGHT: ICD-10-PCS | Mod: 26,RT,, | Performed by: RADIOLOGY

## 2022-01-04 PROCEDURE — 1159F PR MEDICATION LIST DOCUMENTED IN MEDICAL RECORD: ICD-10-PCS | Mod: CPTII,S$GLB,, | Performed by: ORTHOPAEDIC SURGERY

## 2022-01-04 PROCEDURE — 99999 PR PBB SHADOW E&M-EST. PATIENT-LVL III: CPT | Mod: PBBFAC,,, | Performed by: ORTHOPAEDIC SURGERY

## 2022-01-04 PROCEDURE — 3008F BODY MASS INDEX DOCD: CPT | Mod: CPTII,S$GLB,, | Performed by: ORTHOPAEDIC SURGERY

## 2022-01-04 PROCEDURE — 1159F MED LIST DOCD IN RCRD: CPT | Mod: CPTII,S$GLB,, | Performed by: ORTHOPAEDIC SURGERY

## 2022-01-04 PROCEDURE — 99999 PR PBB SHADOW E&M-EST. PATIENT-LVL III: ICD-10-PCS | Mod: PBBFAC,,, | Performed by: ORTHOPAEDIC SURGERY

## 2022-01-04 PROCEDURE — 99213 OFFICE O/P EST LOW 20 MIN: CPT | Mod: S$GLB,,, | Performed by: ORTHOPAEDIC SURGERY

## 2022-01-04 PROCEDURE — 99213 PR OFFICE/OUTPT VISIT, EST, LEVL III, 20-29 MIN: ICD-10-PCS | Mod: S$GLB,,, | Performed by: ORTHOPAEDIC SURGERY

## 2022-01-04 PROCEDURE — 1160F PR REVIEW ALL MEDS BY PRESCRIBER/CLIN PHARMACIST DOCUMENTED: ICD-10-PCS | Mod: CPTII,S$GLB,, | Performed by: ORTHOPAEDIC SURGERY

## 2022-01-04 PROCEDURE — 73130 X-RAY EXAM OF HAND: CPT | Mod: 26,RT,, | Performed by: RADIOLOGY

## 2022-01-04 NOTE — PROGRESS NOTES
2022    Past Medical History:   Diagnosis Date    Breast CA     Breast cancer 2013    left breast infiltrating ductal carcinoma, ER/[place] positive, Her2 negative    Difficult intravenous access     patient is a hard iv stick    Lung nodules     Needle phobia     Obese     SADIE (obstructive sleep apnea)     on cpap       Past Surgical History:   Procedure Laterality Date    BREAST BIOPSY  2013    left breast- IDC    BREAST SURGERY  10//    bilateral mastectomy and reconstruction-no implants     SECTION      x3    CHOLECYSTECTOMY      COLON SURGERY  2009    benign neoplasm recheck 2014, 12 inches    EPIDURAL STEROID INJECTION INTO LUMBAR SPINE N/A 2019    Procedure: Injection-steroid-epidural-lumbar;  Surgeon: Niko Meek MD;  Location: [facility] OR;  Service: Pain Management;  Laterality: N/A;  L2-L3    EPIDURAL STEROID INJECTION INTO LUMBAR SPINE N/A 10/20/2020    Procedure: Injection-steroid-epidural-lumbar;  Surgeon: Niko Meek MD;  Location: [facility] OR;  Service: Pain Management;  Laterality: N/A;  L2-3   Pt wanted to let ASC know she is a hard stick     HYSTERECTOMY      TVH/BSO, benign    TONSILLECTOMY      TUBAL LIGATION         Current Outpatient Medications   Medication Sig    cetirizine (ZYRTEC) 10 MG tablet Take 1 tablet (10 mg total) by mouth once daily.    cholecalciferol, vitamin D3, (VITAMIN D3) 50 mcg (2,000 unit) Cap     cyanocobalamin-cobamamide (B12) 5,000-100 mcg Lozg     cyclobenzaprine (FLEXERIL) 10 MG tablet cyclobenzaprine 10 mg tablet   TAKE 1 TABLET EVERY 6 HOURS AS NEEDED FOR MUSCLE SPASMS    glucosam-chond-hyalu-CF borate 750 mg-100 mg- 1.65 mg-108 mg Tab Take 1 tablet by mouth once daily.    meloxicam (MOBIC) 15 MG tablet Take 15 mg by mouth daily as needed.    multivit-min-vit C-herb no.124 (AIRBORNE, ASCORBIC ACID,) 250-8.875 mg Chew     multivitamin capsule Take 1 capsule by mouth once daily.    wheat dextrin 3 gram/3.5 gram PwPk Take by  mouth.    zinc sulfate (ZINC-15 ORAL) Take by mouth.     No current facility-administered medications for this visit.       Review of patient's allergies indicates:   Allergen Reactions    Morphine Shortness Of Breath    Adhesive Rash     AGENT: Patient thinks it was PAPER TAPE    Aspirin Other (See Comments)     Stomach pain    Penicillin g Other (See Comments)     Stomach pain    Morphine sulfate        Family History   Problem Relation Age of Onset    Cancer Mother         liver, bone, breast    Hypertension Mother     Heart disease Mother     Breast cancer Mother 63        initial dx ILC at 63 with recurrence at 67, 2nd CA IDC at 67    Cancer Father 62        bone ? breast    Diabetes Father     Breast cancer Maternal Aunt 50    Ovarian cancer Cousin 49        daughter of M Aunt with Breast CA    Glaucoma Neg Hx        Social History     Socioeconomic History    Marital status:    Tobacco Use    Smoking status: Never Smoker    Smokeless tobacco: Never Used   Substance and Sexual Activity    Alcohol use: Yes     Comment: rarely    Drug use: No    Sexual activity: Yes     Partners: Male     Birth control/protection: Surgical     Social Determinants of Health     Financial Resource Strain: Low Risk     Difficulty of Paying Living Expenses: Not very hard   Food Insecurity: No Food Insecurity    Worried About Running Out of Food in the Last Year: Never true    Ran Out of Food in the Last Year: Never true   Transportation Needs: No Transportation Needs    Lack of Transportation (Medical): No    Lack of Transportation (Non-Medical): No   Physical Activity: Insufficiently Active    Days of Exercise per Week: 1 day    Minutes of Exercise per Session: 10 min   Stress: Stress Concern Present    Feeling of Stress : To some extent   Social Connections: Unknown    Frequency of Communication with Friends and Family: Three times a week    Frequency of Social Gatherings with Friends and  "Family: Once a week    Active Member of Clubs or Organizations: Yes    Attends Club or Organization Meetings: More than 4 times per year    Marital Status:    Housing Stability: Low Risk     Unable to Pay for Housing in the Last Year: No    Number of Places Lived in the Last Year: 2    Unstable Housing in the Last Year: No       Chief Complaint:   Chief Complaint   Patient presents with    Right Hand - Pain, Follow-up     Right hand pain + lump in palm of hand which appeared about 2 weeks ago following FX. DOI: 10/01/21 -- 2 months s/p Avulsion FX of Right middle phalanx of 4th digit. LV 11/10/21-Instructed on ROM exercises & continue ROM at home.     Hand Pain     Pt is left hand dominant. However, palm of right hand is sensitive to touch/palpation.          History of present illness:    This is a 56 y.o. year old female who complains of patient is being seen today for a lump in the palm of her hand it appeared about 2 weeks ago she complains that the palm is sensitive to touch    Review of Systems:    Constitution: Denies chills, fever, and sweats.  HENT: Denies headaches or blurry vision.  Cardiovascular: Denies chest pain or irregular heart beat.  Respiratory: Denies cough or shortness of breath.  Gastrointestinal: Denies abdominal pain, nausea, or vomiting.  Musculoskeletal:  Denies muscle cramps.  Neurological: Denies dizziness or focal weakness.  Psychiatric/Behavioral: Normal mental status.  Hematologic/Lymphatic: Denies bleeding problem or easy bruising/bleeding.  Skin: Denies rash or suspicious lesions.    Examination:    Vital Signs:    Vitals:    01/04/22 0855   Resp: 16   Weight: 132 kg (291 lb 0.1 oz)   Height: 5' 7" (1.702 m)   PainSc:   4   PainLoc: Hand       Body mass index is 45.58 kg/m².    This a well-developed, well nourished patient in no acute distress.    Alert and oriented x 3 and cooperative to examination.       Physical Exam:  Right hand-the patient is a small nodule in " the palm midportion of her palm it looks like a thickening of the palmar fascia she has full extension of fingers in full extension and flexion of the fingers it is slightly painful    Imaging:  X-ray of the right hand show some degenerative changes of the distal phalanx is in the D IP joints no active lesions       Assessment: Mass of right hand        Plan:  Patient appears to have a fibroid nodule in her palm of her right hand it is causing no contractures she noticed it about 2 weeks ago.  I would recommend observation have her come back in about 6 weeks see if there has been any change is causing no evidence of any contractures.      DISCLAIMER: This note may have been dictated using voice recognition software and may contain grammatical errors.     NOTE: Consult report sent to referring provider via [employer] EMR.

## 2022-01-28 ENCOUNTER — OFFICE VISIT (OUTPATIENT)
Dept: PODIATRY | Facility: CLINIC | Age: 57
End: 2022-01-28
Payer: COMMERCIAL

## 2022-01-28 DIAGNOSIS — L74.8 FOOT ODOR: Primary | ICD-10-CM

## 2022-01-28 PROCEDURE — 1159F PR MEDICATION LIST DOCUMENTED IN MEDICAL RECORD: ICD-10-PCS | Mod: CPTII,S$GLB,, | Performed by: STUDENT IN AN ORGANIZED HEALTH CARE EDUCATION/TRAINING PROGRAM

## 2022-01-28 PROCEDURE — 99214 OFFICE O/P EST MOD 30 MIN: CPT | Mod: S$GLB,,, | Performed by: STUDENT IN AN ORGANIZED HEALTH CARE EDUCATION/TRAINING PROGRAM

## 2022-01-28 PROCEDURE — 99999 PR PBB SHADOW E&M-EST. PATIENT-LVL III: ICD-10-PCS | Mod: PBBFAC,,, | Performed by: STUDENT IN AN ORGANIZED HEALTH CARE EDUCATION/TRAINING PROGRAM

## 2022-01-28 PROCEDURE — 1159F MED LIST DOCD IN RCRD: CPT | Mod: CPTII,S$GLB,, | Performed by: STUDENT IN AN ORGANIZED HEALTH CARE EDUCATION/TRAINING PROGRAM

## 2022-01-28 PROCEDURE — 1160F PR REVIEW ALL MEDS BY PRESCRIBER/CLIN PHARMACIST DOCUMENTED: ICD-10-PCS | Mod: CPTII,S$GLB,, | Performed by: STUDENT IN AN ORGANIZED HEALTH CARE EDUCATION/TRAINING PROGRAM

## 2022-01-28 PROCEDURE — 99214 PR OFFICE/OUTPT VISIT, EST, LEVL IV, 30-39 MIN: ICD-10-PCS | Mod: S$GLB,,, | Performed by: STUDENT IN AN ORGANIZED HEALTH CARE EDUCATION/TRAINING PROGRAM

## 2022-01-28 PROCEDURE — 99999 PR PBB SHADOW E&M-EST. PATIENT-LVL III: CPT | Mod: PBBFAC,,, | Performed by: STUDENT IN AN ORGANIZED HEALTH CARE EDUCATION/TRAINING PROGRAM

## 2022-01-28 PROCEDURE — 1160F RVW MEDS BY RX/DR IN RCRD: CPT | Mod: CPTII,S$GLB,, | Performed by: STUDENT IN AN ORGANIZED HEALTH CARE EDUCATION/TRAINING PROGRAM

## 2022-02-01 ENCOUNTER — NUTRITION (OUTPATIENT)
Dept: NUTRITION | Facility: HOSPITAL | Age: 57
End: 2022-02-01
Payer: COMMERCIAL

## 2022-02-01 DIAGNOSIS — E66.9 OBESITY, UNSPECIFIED CLASSIFICATION, UNSPECIFIED OBESITY TYPE, UNSPECIFIED WHETHER SERIOUS COMORBIDITY PRESENT: Primary | ICD-10-CM

## 2022-02-01 NOTE — PROGRESS NOTES
Subjective:      Patient ID: Leandra Lopes is a 56 y.o. female.    Chief Complaint: Nail Problem (Primitivo great toenail discoloration, thick toenails, and foot odor. )    Patient reports several weeks of foot odor. She reports wearing house slippers, sandals and flip flops mostly. She hasn't really tried any treatments for the smell but would like some resolution.     Review of Systems   All other systems reviewed and are negative.          Objective:      Physical Exam  Cardiovascular:      Pulses:           Dorsalis pedis pulses are 2+ on the right side and 2+ on the left side.        Posterior tibial pulses are 2+ on the right side and 2+ on the left side.   Skin:     Comments: Examination of the skin reveals no evidence of significant rashes, open lesions, suspicious appearing nevi or other concerning lesions.                  Assessment:       Encounter Diagnosis   Name Primary?    Foot odor Yes         Plan:       Leandra was seen today for nail problem.    Diagnoses and all orders for this visit:    Foot odor      I counseled the patient on her conditions, their implications and medical management.    I recommend that she stopped wearing her house slippers and purchase a pair with montilla wool lining. The sweating from the synthetic materials can encourage microbial growth and cause odor.     Flip flops and sandals can also trap odors that can transfer to your feet. Allow flip flops to bake in direct sunlight and use lysol to help lower microbial load.    Foot soaks with a small cap of bleach with a gallon of water can help with microbial growth.     Follow up PRN.       Kody Bean DPM

## 2022-02-21 DIAGNOSIS — S62.629D CLOSED AVULSION FRACTURE OF MIDDLE PHALANX OF FINGER WITH ROUTINE HEALING, SUBSEQUENT ENCOUNTER: ICD-10-CM

## 2022-02-21 DIAGNOSIS — S62.629A CLOSED AVULSION FRACTURE OF MIDDLE PHALANX OF FINGER, INITIAL ENCOUNTER: Primary | ICD-10-CM

## 2022-02-22 ENCOUNTER — OFFICE VISIT (OUTPATIENT)
Dept: ORTHOPEDICS | Facility: CLINIC | Age: 57
End: 2022-02-22
Payer: COMMERCIAL

## 2022-02-22 ENCOUNTER — HOSPITAL ENCOUNTER (OUTPATIENT)
Dept: RADIOLOGY | Facility: HOSPITAL | Age: 57
Discharge: HOME | End: 2022-02-22
Attending: ORTHOPAEDIC SURGERY
Payer: COMMERCIAL

## 2022-02-22 VITALS — BODY MASS INDEX: 43.16 KG/M2 | WEIGHT: 275 LBS | HEIGHT: 67 IN

## 2022-02-22 VITALS — BODY MASS INDEX: 43.15 KG/M2 | HEIGHT: 67 IN | WEIGHT: 274.94 LBS

## 2022-02-22 DIAGNOSIS — M72.0 DUPUYTREN'S DISEASE OF PALM OF RIGHT HAND: ICD-10-CM

## 2022-02-22 DIAGNOSIS — S62.629D CLOSED AVULSION FRACTURE OF MIDDLE PHALANX OF FINGER WITH ROUTINE HEALING, SUBSEQUENT ENCOUNTER: ICD-10-CM

## 2022-02-22 DIAGNOSIS — R22.31 MASS OF RIGHT HAND: Primary | ICD-10-CM

## 2022-02-22 PROCEDURE — 3008F BODY MASS INDEX DOCD: CPT | Mod: CPTII,S$GLB,, | Performed by: ORTHOPAEDIC SURGERY

## 2022-02-22 PROCEDURE — 1159F PR MEDICATION LIST DOCUMENTED IN MEDICAL RECORD: ICD-10-PCS | Mod: CPTII,S$GLB,, | Performed by: ORTHOPAEDIC SURGERY

## 2022-02-22 PROCEDURE — 1159F MED LIST DOCD IN RCRD: CPT | Mod: CPTII,S$GLB,, | Performed by: ORTHOPAEDIC SURGERY

## 2022-02-22 PROCEDURE — 99999 PR PBB SHADOW E&M-EST. PATIENT-LVL III: CPT | Mod: PBBFAC,,, | Performed by: ORTHOPAEDIC SURGERY

## 2022-02-22 PROCEDURE — 99212 OFFICE O/P EST SF 10 MIN: CPT | Mod: S$GLB,,, | Performed by: ORTHOPAEDIC SURGERY

## 2022-02-22 PROCEDURE — 73130 X-RAY EXAM OF HAND: CPT | Mod: TC,PN,RT

## 2022-02-22 PROCEDURE — 73130 X-RAY EXAM OF HAND: CPT | Mod: 26,RT,, | Performed by: RADIOLOGY

## 2022-02-22 PROCEDURE — 3008F PR BODY MASS INDEX (BMI) DOCUMENTED: ICD-10-PCS | Mod: CPTII,S$GLB,, | Performed by: ORTHOPAEDIC SURGERY

## 2022-02-22 PROCEDURE — 99212 PR OFFICE/OUTPT VISIT, EST, LEVL II, 10-19 MIN: ICD-10-PCS | Mod: S$GLB,,, | Performed by: ORTHOPAEDIC SURGERY

## 2022-02-22 PROCEDURE — 99999 PR PBB SHADOW E&M-EST. PATIENT-LVL III: ICD-10-PCS | Mod: PBBFAC,,, | Performed by: ORTHOPAEDIC SURGERY

## 2022-02-22 PROCEDURE — 73130 XR HAND COMPLETE 3 VIEW RIGHT: ICD-10-PCS | Mod: 26,RT,, | Performed by: RADIOLOGY

## 2022-02-22 NOTE — PROGRESS NOTES
2022    Past Medical History:   Diagnosis Date    Breast CA     Breast cancer 2013    left breast infiltrating ductal carcinoma, ER/[place] positive, Her2 negative    Difficult intravenous access     patient is a hard iv stick    Lung nodules     Needle phobia     Obese     SADIE (obstructive sleep apnea)     on cpap       Past Surgical History:   Procedure Laterality Date    BREAST BIOPSY  2013    left breast- IDC    BREAST SURGERY  10//    bilateral mastectomy and reconstruction-no implants     SECTION      x3    CHOLECYSTECTOMY      COLON SURGERY  2009    benign neoplasm recheck 2014, 12 inches    EPIDURAL STEROID INJECTION INTO LUMBAR SPINE N/A 2019    Procedure: Injection-steroid-epidural-lumbar;  Surgeon: Niko Meek MD;  Location: [facility] OR;  Service: Pain Management;  Laterality: N/A;  L2-L3    EPIDURAL STEROID INJECTION INTO LUMBAR SPINE N/A 10/20/2020    Procedure: Injection-steroid-epidural-lumbar;  Surgeon: Niko Meek MD;  Location: [facility] OR;  Service: Pain Management;  Laterality: N/A;  L2-3   Pt wanted to let ASC know she is a hard stick     HYSTERECTOMY      TVH/BSO, benign    TONSILLECTOMY      TUBAL LIGATION         Current Outpatient Medications   Medication Sig    cetirizine (ZYRTEC) 10 MG tablet Take 1 tablet (10 mg total) by mouth once daily.    cholecalciferol, vitamin D3, (VITAMIN D3) 50 mcg (2,000 unit) Cap     cyanocobalamin-cobamamide (B12) 5,000-100 mcg Lozg     cyclobenzaprine (FLEXERIL) 10 MG tablet cyclobenzaprine 10 mg tablet   TAKE 1 TABLET EVERY 6 HOURS AS NEEDED FOR MUSCLE SPASMS    glucosam-chond-hyalu-CF borate 750 mg-100 mg- 1.65 mg-108 mg Tab Take 1 tablet by mouth once daily.    meloxicam (MOBIC) 15 MG tablet Take 15 mg by mouth daily as needed.    multivit-min-vit C-herb no.124 (AIRBORNE, ASCORBIC ACID,) 250-8.875 mg Chew     multivitamin capsule Take 1 capsule by mouth once daily.    wheat dextrin 3 gram/3.5 gram PwPk Take by  mouth.    zinc sulfate (ZINC-15 ORAL) Take by mouth.     No current facility-administered medications for this visit.       Review of patient's allergies indicates:   Allergen Reactions    Morphine Shortness Of Breath    Adhesive Rash     AGENT: Patient thinks it was PAPER TAPE    Aspirin Other (See Comments)     Stomach pain    Penicillin g Other (See Comments)     Stomach pain    Morphine sulfate        Family History   Problem Relation Age of Onset    Cancer Mother         liver, bone, breast    Hypertension Mother     Heart disease Mother     Breast cancer Mother 63        initial dx ILC at 63 with recurrence at 67, 2nd CA IDC at 67    Cancer Father 62        bone ? breast    Diabetes Father     Breast cancer Maternal Aunt 50    Ovarian cancer Cousin 49        daughter of M Aunt with Breast CA    Glaucoma Neg Hx        Social History     Socioeconomic History    Marital status:    Tobacco Use    Smoking status: Never Smoker    Smokeless tobacco: Never Used   Substance and Sexual Activity    Alcohol use: Yes     Comment: rarely    Drug use: No    Sexual activity: Yes     Partners: Male     Birth control/protection: Surgical     Social Determinants of Health     Financial Resource Strain: Low Risk     Difficulty of Paying Living Expenses: Not very hard   Food Insecurity: No Food Insecurity    Worried About Running Out of Food in the Last Year: Never true    Ran Out of Food in the Last Year: Never true   Transportation Needs: No Transportation Needs    Lack of Transportation (Medical): No    Lack of Transportation (Non-Medical): No   Physical Activity: Insufficiently Active    Days of Exercise per Week: 1 day    Minutes of Exercise per Session: 10 min   Stress: Stress Concern Present    Feeling of Stress : To some extent   Social Connections: Unknown    Frequency of Communication with Friends and Family: Three times a week    Frequency of Social Gatherings with Friends and  "Family: Once a week    Active Member of Clubs or Organizations: Yes    Attends Club or Organization Meetings: More than 4 times per year    Marital Status:    Housing Stability: Low Risk     Unable to Pay for Housing in the Last Year: No    Number of Places Lived in the Last Year: 2    Unstable Housing in the Last Year: No       Chief Complaint:   Chief Complaint   Patient presents with    Right Hand - Follow-up     Fibroid nodule in her palm of her right hand here for rechec to see if there has been any growth in size. LOV 01/04/2022         History of present illness:    This is a 56 y.o. year old female who complains of the patient is following up today she has a history of fibroid nodule of the palmar fascia in the palm of her hand will rechecking in today of she has had a further growth or causing any contractures of the hand.  The patient states she is not having any pain over the nodule and she says the nodule is not getting any bigger    Review of Systems:    Constitution: Denies chills, fever, and sweats.  HENT: Denies headaches or blurry vision.  Cardiovascular: Denies chest pain or irregular heart beat.  Respiratory: Denies cough or shortness of breath.  Gastrointestinal: Denies abdominal pain, nausea, or vomiting.  Musculoskeletal:  Denies muscle cramps.  Neurological: Denies dizziness or focal weakness.  Psychiatric/Behavioral: Normal mental status.  Hematologic/Lymphatic: Denies bleeding problem or easy bruising/bleeding.  Skin: Denies rash or suspicious lesions.    Examination:    Vital Signs:    Vitals:    02/22/22 1409   Weight: 124.7 kg (274 lb 14.6 oz)   Height: 5' 7" (1.702 m)       Body mass index is 43.06 kg/m².    This a well-developed, well nourished patient in no acute distress.    Alert and oriented x 3 and cooperative to examination.       Physical Exam:  Right hand-the patient has full extension and full flexion of the fingers she has a Dupuytren's nodule in the palm of her " hand does not be appear to be painful and is not getting any better    Imaging:  No x-rays       Assessment: Mass of right hand    Dupuytren's disease of palm of right hand        Plan:  We are going to observe this for now is no reason to do anything surgical with it she is going to return if she sees the mass getting bigger or causing some contracture of the finger      DISCLAIMER: This note may have been dictated using voice recognition software and may contain grammatical errors.     NOTE: Consult report sent to referring provider via [employer] EMR.

## 2022-02-22 NOTE — PROGRESS NOTES
Diagnosis/Reason for Referral:          Obesity/high cholesterol  Medical Nutrition Prescription:            Medical Nutrition Therapy        Ht: 67in           Weight: 275#              BMI: 43     Sleep: Sleep apnea x 10 years, uses BIPAP     Food allergies/intolerances: none but did have when a child     Meals: 3 daily and 1 snack      24hr recall:   BF: nutrigrain and coffee  Lunch: cup soup and salad  Dinner: crustless spinach quiche  Fluids: mainly water  Snacks: orange, yogurt, guiradelli chocolate (individual)     Exercise: walking 1 mile, 4x/week, stopped for a couple weeks d/t daughter hurting her knee     Comments:     Patient reports eating small portions, especially at night time, cut back on fried food and eating out which she is very proud of. She started walking with her daughter which she finds very helpful and is hopeful that they will be able to start back up soon.     Encouraged patient to continue eating small portions, not labeling foods bad, increase intake of fruits and veggies if she wants to.         Goals: 1. Continue to increase intake of less processed foods, follow plate method              2. Start walking again when daughter is better   3. Increase fruit intake                Thank you for the referral.      Pratima Zuñiga, MS, RD/LDN

## 2022-02-25 ENCOUNTER — TELEPHONE (OUTPATIENT)
Dept: FAMILY MEDICINE | Facility: CLINIC | Age: 57
End: 2022-02-25
Payer: COMMERCIAL

## 2022-02-25 NOTE — TELEPHONE ENCOUNTER
----- Message from Di Forte sent at 2/25/2022 10:17 AM CST -----  Type: Needs Medical Advice  Who Called:  PT  Symptoms (please be specific):  Pt is asking to discuss medications with nurse    Best Call Back Number: [phone]  Additional Information: Please call and advise       
Baldev Albert MD  You 2 hours ago (12:34 PM)         If it is helping then take until it runs out, then stop    Message text         
Call placed to patient for notification. Patient verbalized understanding.   
Patient states she received a prescription for Phentermine, and started taking the medication the beginning of December 2021. States 1 tablet cause her to feel dizzy, and she was advised she could take 1/2 a tablet. Patient has been taking 1/2 tablet as advised, and is requesting to know how long she should take this medication. Requesting to know if she should continue taking this medication, stop taking this medication for a while, or discontinue use completely. Please advise.   
No

## 2022-10-26 ENCOUNTER — TELEPHONE (OUTPATIENT)
Dept: FAMILY MEDICINE | Facility: CLINIC | Age: 57
End: 2022-10-26
Payer: COMMERCIAL

## 2022-10-26 NOTE — TELEPHONE ENCOUNTER
----- Message from Ngozi Kendrick sent at 10/26/2022  8:59 AM CDT -----  Type: Patient Call Back         Who called: Patient         What is the request in detail: calling to sched annual visit; last annual was 3/21/2021; please advise           Best call back number: [phone]           Additional Information: prefers 2:30pm or later or anytime Friday           Thank You

## 2022-10-26 NOTE — TELEPHONE ENCOUNTER
Patient requesting to schedule annual appointment with Dr. Albert. States she will need appointment after 2:30 pm due to work schedule. Appointment scheduled for the date of 1-30-23. Patient agreed to appointment date, time, and location.

## 2023-01-03 ENCOUNTER — OFFICE VISIT (OUTPATIENT)
Dept: URGENT CARE | Facility: CLINIC | Age: 58
End: 2023-01-03
Payer: COMMERCIAL

## 2023-01-03 VITALS
HEIGHT: 67 IN | HEART RATE: 78 BPM | SYSTOLIC BLOOD PRESSURE: 119 MMHG | DIASTOLIC BLOOD PRESSURE: 77 MMHG | WEIGHT: 276 LBS | RESPIRATION RATE: 16 BRPM | OXYGEN SATURATION: 98 % | TEMPERATURE: 99 F | BODY MASS INDEX: 43.32 KG/M2

## 2023-01-03 DIAGNOSIS — R09.81 NASAL CONGESTION: Primary | ICD-10-CM

## 2023-01-03 DIAGNOSIS — Z20.822 COVID-19 VIRUS NOT DETECTED: ICD-10-CM

## 2023-01-03 DIAGNOSIS — Z20.822 SUSPECTED COVID-19 VIRUS INFECTION: ICD-10-CM

## 2023-01-03 DIAGNOSIS — B34.9 VIRAL SYNDROME: ICD-10-CM

## 2023-01-03 LAB
CTP QC/QA: YES
CTP QC/QA: YES
FLUAV AG NPH QL: NEGATIVE
FLUBV AG NPH QL: NEGATIVE
SARS-COV-2 AG RESP QL IA.RAPID: NEGATIVE

## 2023-01-03 PROCEDURE — 87804 INFLUENZA ASSAY W/OPTIC: CPT | Mod: 59,QW,, | Performed by: NURSE PRACTITIONER

## 2023-01-03 PROCEDURE — 1159F MED LIST DOCD IN RCRD: CPT | Mod: CPTII,S$GLB,, | Performed by: NURSE PRACTITIONER

## 2023-01-03 PROCEDURE — 3008F BODY MASS INDEX DOCD: CPT | Mod: CPTII,S$GLB,, | Performed by: NURSE PRACTITIONER

## 2023-01-03 PROCEDURE — 87804 POCT INFLUENZA A/B: ICD-10-PCS | Mod: 59,QW,, | Performed by: NURSE PRACTITIONER

## 2023-01-03 PROCEDURE — 87811 SARS CORONAVIRUS 2 ANTIGEN POCT, MANUAL READ: ICD-10-PCS | Mod: QW,S$GLB,, | Performed by: NURSE PRACTITIONER

## 2023-01-03 PROCEDURE — 1159F PR MEDICATION LIST DOCUMENTED IN MEDICAL RECORD: ICD-10-PCS | Mod: CPTII,S$GLB,, | Performed by: NURSE PRACTITIONER

## 2023-01-03 PROCEDURE — 3074F PR MOST RECENT SYSTOLIC BLOOD PRESSURE < 130 MM HG: ICD-10-PCS | Mod: CPTII,S$GLB,, | Performed by: NURSE PRACTITIONER

## 2023-01-03 PROCEDURE — 3008F PR BODY MASS INDEX (BMI) DOCUMENTED: ICD-10-PCS | Mod: CPTII,S$GLB,, | Performed by: NURSE PRACTITIONER

## 2023-01-03 PROCEDURE — 99204 OFFICE O/P NEW MOD 45 MIN: CPT | Mod: S$GLB,,, | Performed by: NURSE PRACTITIONER

## 2023-01-03 PROCEDURE — 1160F PR REVIEW ALL MEDS BY PRESCRIBER/CLIN PHARMACIST DOCUMENTED: ICD-10-PCS | Mod: CPTII,S$GLB,, | Performed by: NURSE PRACTITIONER

## 2023-01-03 PROCEDURE — 3078F DIAST BP <80 MM HG: CPT | Mod: CPTII,S$GLB,, | Performed by: NURSE PRACTITIONER

## 2023-01-03 PROCEDURE — 1160F RVW MEDS BY RX/DR IN RCRD: CPT | Mod: CPTII,S$GLB,, | Performed by: NURSE PRACTITIONER

## 2023-01-03 PROCEDURE — 87811 SARS-COV-2 COVID19 W/OPTIC: CPT | Mod: QW,S$GLB,, | Performed by: NURSE PRACTITIONER

## 2023-01-03 PROCEDURE — 3074F SYST BP LT 130 MM HG: CPT | Mod: CPTII,S$GLB,, | Performed by: NURSE PRACTITIONER

## 2023-01-03 PROCEDURE — 99204 PR OFFICE/OUTPT VISIT, NEW, LEVL IV, 45-59 MIN: ICD-10-PCS | Mod: S$GLB,,, | Performed by: NURSE PRACTITIONER

## 2023-01-03 PROCEDURE — 3078F PR MOST RECENT DIASTOLIC BLOOD PRESSURE < 80 MM HG: ICD-10-PCS | Mod: CPTII,S$GLB,, | Performed by: NURSE PRACTITIONER

## 2023-01-03 RX ORDER — BENZONATATE 100 MG/1
100 CAPSULE ORAL 3 TIMES DAILY PRN
Qty: 30 CAPSULE | Refills: 0 | Status: SHIPPED | OUTPATIENT
Start: 2023-01-03 | End: 2023-01-13

## 2023-01-03 RX ORDER — FLUTICASONE PROPIONATE 50 MCG
1 SPRAY, SUSPENSION (ML) NASAL DAILY
Qty: 15.8 ML | Refills: 0 | Status: SHIPPED | OUTPATIENT
Start: 2023-01-03 | End: 2023-12-14

## 2023-01-03 RX ORDER — CETIRIZINE HYDROCHLORIDE 10 MG/1
10 TABLET ORAL DAILY
Qty: 30 TABLET | Refills: 0 | Status: SHIPPED | OUTPATIENT
Start: 2023-01-03 | End: 2023-05-22

## 2023-01-03 RX ORDER — PROMETHAZINE HYDROCHLORIDE AND DEXTROMETHORPHAN HYDROBROMIDE 6.25; 15 MG/5ML; MG/5ML
5 SYRUP ORAL EVERY 4 HOURS PRN
Qty: 118 ML | Refills: 0 | Status: SHIPPED | OUTPATIENT
Start: 2023-01-03 | End: 2023-01-13

## 2023-01-03 RX ORDER — AZELASTINE 1 MG/ML
1 SPRAY, METERED NASAL 2 TIMES DAILY
Qty: 30 ML | Refills: 0 | Status: SHIPPED | OUTPATIENT
Start: 2023-01-03 | End: 2023-12-14

## 2023-01-03 NOTE — PATIENT INSTRUCTIONS
Symptomatic treatment to include:    Rest, increase fluid intake to include 50 % water, 50% electrolyte replacement  Ibuprofen/Tylenol as directed for fever, sore throat, headache, body aches.  Tylenol helps with fever but ibuprofen or aleve helps best for other symptoms.   Always take ibuprofen and or Aleve with food as repeated use can cause stomach irritation.  It is also advised to start taking Pepcid 20 mg over-the-counter twice a day for 7-10 days whenever taking NSAIDs for extended times for stomach protection  Zrytec 10 mg daily for 3-4 weeks  flonase 2 sprays each nostril daily until bottle is empty.   Astelin 1 spray each nostril twice a day as needed for nasal congestion  Phenergan cough syrup at night for cough.  Will cause drowsiness  Tessalon perles cough pills as needed day or night.  Can be taken together with cough syrup if desired.  Helps best for dry, throat irritation cough.  Mucinex D over the counter as directed for sinus congestion.  Coricidin HBP if you have high blood pressure.  Warm, salt water gargles, over the counter throat lozenges or sprays as desires.   Liquid benadryl and maalox 1 to 1 concentration, gargle and spit for temporary relief for sore throat.  Imodium over the counter as directed for diarrhea if desired.  ER for difficulty breathing not relieved by rest, excessive lethargy and/or change in mental status

## 2023-01-03 NOTE — PROGRESS NOTES
"Subjective:       Patient ID: Leandra Lopes is a 57 y.o. female.    Vitals:  height is 5' 7" (1.702 m) and weight is 125.2 kg (276 lb). Her temperature is 98.5 °F (36.9 °C). Her blood pressure is 119/77 and her pulse is 78. Her respiration is 16 and oxygen saturation is 98%.     Chief Complaint: Nasal Congestion    Pt states started w/ runny nose, nasal congestion, headache, fatigue, slight cough, post nasal drip, diarrhea X 4 days.     Constitution: Positive for appetite change, chills and fatigue. Negative for fever.   HENT:  Positive for congestion and sore throat.    Cardiovascular:  Negative for chest pain.   Respiratory:  Positive for chest tightness and cough.    Gastrointestinal:  Positive for diarrhea. Negative for abdominal pain, nausea and vomiting.   Musculoskeletal:  Positive for muscle cramps.   Neurological:  Positive for headaches.     Objective:      Physical Exam   Constitutional: She is oriented to person, place, and time. She appears well-developed.  Non-toxic appearance. She does not appear ill. No distress.   HENT:   Head: Normocephalic and atraumatic.   Ears:   Right Ear: Tympanic membrane, external ear and ear canal normal.   Left Ear: Tympanic membrane, external ear and ear canal normal.   Nose: Rhinorrhea and congestion present.   Mouth/Throat: Mucous membranes are moist. Posterior oropharyngeal erythema present. No oropharyngeal exudate.   Eyes: Conjunctivae and EOM are normal.   Cardiovascular: Normal rate, regular rhythm and normal heart sounds.   Pulmonary/Chest: Effort normal and breath sounds normal. No respiratory distress. She has no wheezes. She has no rhonchi. She has no rales.   Abdominal: Normal appearance.   Neurological: no focal deficit. She is alert and oriented to person, place, and time.   Skin: Skin is warm and dry. Capillary refill takes 2 to 3 seconds.   Psychiatric: Her behavior is normal. Mood normal.   Nursing note and vitals reviewed.      Assessment:       1. Nasal " congestion    2. COVID-19 virus not detected    3. Viral syndrome    4. Suspected COVID-19 virus infection        Flu a/b neg    Plan:         Nasal congestion  -     SARS Coronavirus 2 Antigen, POCT Manual Read  -     POCT Influenza A/B    COVID-19 virus not detected    Viral syndrome  -     azelastine (ASTELIN) 137 mcg (0.1 %) nasal spray; 1 spray (137 mcg total) by Nasal route 2 (two) times daily.  Dispense: 30 mL; Refill: 0  -     fluticasone propionate (FLONASE) 50 mcg/actuation nasal spray; 1 spray (50 mcg total) by Each Nostril route once daily.  Dispense: 15.8 mL; Refill: 0  -     cetirizine (ZYRTEC) 10 MG tablet; Take 1 tablet (10 mg total) by mouth once daily.  Dispense: 30 tablet; Refill: 0  -     benzonatate (TESSALON) 100 MG capsule; Take 1 capsule (100 mg total) by mouth 3 (three) times daily as needed for Cough.  Dispense: 30 capsule; Refill: 0  -     promethazine-dextromethorphan (PROMETHAZINE-DM) 6.25-15 mg/5 mL Syrp; Take 5 mLs by mouth every 4 (four) hours as needed (cough).  Dispense: 118 mL; Refill: 0    Suspected COVID-19 virus infection  -     azelastine (ASTELIN) 137 mcg (0.1 %) nasal spray; 1 spray (137 mcg total) by Nasal route 2 (two) times daily.  Dispense: 30 mL; Refill: 0  -     fluticasone propionate (FLONASE) 50 mcg/actuation nasal spray; 1 spray (50 mcg total) by Each Nostril route once daily.  Dispense: 15.8 mL; Refill: 0  -     cetirizine (ZYRTEC) 10 MG tablet; Take 1 tablet (10 mg total) by mouth once daily.  Dispense: 30 tablet; Refill: 0  -     benzonatate (TESSALON) 100 MG capsule; Take 1 capsule (100 mg total) by mouth 3 (three) times daily as needed for Cough.  Dispense: 30 capsule; Refill: 0  -     promethazine-dextromethorphan (PROMETHAZINE-DM) 6.25-15 mg/5 mL Syrp; Take 5 mLs by mouth every 4 (four) hours as needed (cough).  Dispense: 118 mL; Refill: 0    Symptomatic treatment to include:    Rest, increase fluid intake to include 50 % water, 50% electrolyte  replacement  Ibuprofen/Tylenol as directed for fever, sore throat, headache, body aches.  Tylenol helps with fever but ibuprofen or aleve helps best for other symptoms.   Always take ibuprofen and or Aleve with food as repeated use can cause stomach irritation.  It is also advised to start taking Pepcid 20 mg over-the-counter twice a day for 7-10 days whenever taking NSAIDs for extended times for stomach protection  Zrytec 10 mg daily for 3-4 weeks  flonase 2 sprays each nostril daily until bottle is empty.   Astelin 1 spray each nostril twice a day as needed for nasal congestion  Phenergan cough syrup at night for cough.  Will cause drowsiness  Tessalon perles cough pills as needed day or night.  Can be taken together with cough syrup if desired.  Helps best for dry, throat irritation cough.  Mucinex D over the counter as directed for sinus congestion.  Coricidin HBP if you have high blood pressure.  Warm, salt water gargles, over the counter throat lozenges or sprays as desires.   Liquid benadryl and maalox 1 to 1 concentration, gargle and spit for temporary relief for sore throat.  Imodium over the counter as directed for diarrhea if desired.  ER for difficulty breathing not relieved by rest, excessive lethargy and/or change in mental status

## 2023-01-30 ENCOUNTER — OFFICE VISIT (OUTPATIENT)
Dept: FAMILY MEDICINE | Facility: CLINIC | Age: 58
End: 2023-01-30
Payer: COMMERCIAL

## 2023-01-30 ENCOUNTER — LAB VISIT (OUTPATIENT)
Dept: LAB | Facility: HOSPITAL | Age: 58
End: 2023-01-30
Attending: STUDENT IN AN ORGANIZED HEALTH CARE EDUCATION/TRAINING PROGRAM
Payer: COMMERCIAL

## 2023-01-30 VITALS
HEART RATE: 76 BPM | RESPIRATION RATE: 18 BRPM | OXYGEN SATURATION: 95 % | BODY MASS INDEX: 43.46 KG/M2 | TEMPERATURE: 99 F | WEIGHT: 276.88 LBS | DIASTOLIC BLOOD PRESSURE: 80 MMHG | HEIGHT: 67 IN | SYSTOLIC BLOOD PRESSURE: 112 MMHG

## 2023-01-30 DIAGNOSIS — E66.01 CLASS 3 SEVERE OBESITY WITHOUT SERIOUS COMORBIDITY IN ADULT, UNSPECIFIED BMI, UNSPECIFIED OBESITY TYPE: ICD-10-CM

## 2023-01-30 DIAGNOSIS — Z00.00 ROUTINE GENERAL MEDICAL EXAMINATION AT A HEALTH CARE FACILITY: ICD-10-CM

## 2023-01-30 DIAGNOSIS — Z00.00 ROUTINE GENERAL MEDICAL EXAMINATION AT A HEALTH CARE FACILITY: Primary | ICD-10-CM

## 2023-01-30 PROCEDURE — 1159F MED LIST DOCD IN RCRD: CPT | Mod: CPTII,S$GLB,, | Performed by: STUDENT IN AN ORGANIZED HEALTH CARE EDUCATION/TRAINING PROGRAM

## 2023-01-30 PROCEDURE — 99396 PR PREVENTIVE VISIT,EST,40-64: ICD-10-PCS | Mod: S$GLB,,, | Performed by: STUDENT IN AN ORGANIZED HEALTH CARE EDUCATION/TRAINING PROGRAM

## 2023-01-30 PROCEDURE — 3079F DIAST BP 80-89 MM HG: CPT | Mod: CPTII,S$GLB,, | Performed by: STUDENT IN AN ORGANIZED HEALTH CARE EDUCATION/TRAINING PROGRAM

## 2023-01-30 PROCEDURE — 3079F PR MOST RECENT DIASTOLIC BLOOD PRESSURE 80-89 MM HG: ICD-10-PCS | Mod: CPTII,S$GLB,, | Performed by: STUDENT IN AN ORGANIZED HEALTH CARE EDUCATION/TRAINING PROGRAM

## 2023-01-30 PROCEDURE — 1160F PR REVIEW ALL MEDS BY PRESCRIBER/CLIN PHARMACIST DOCUMENTED: ICD-10-PCS | Mod: CPTII,S$GLB,, | Performed by: STUDENT IN AN ORGANIZED HEALTH CARE EDUCATION/TRAINING PROGRAM

## 2023-01-30 PROCEDURE — 80061 LIPID PANEL: CPT | Performed by: STUDENT IN AN ORGANIZED HEALTH CARE EDUCATION/TRAINING PROGRAM

## 2023-01-30 PROCEDURE — 3008F PR BODY MASS INDEX (BMI) DOCUMENTED: ICD-10-PCS | Mod: CPTII,S$GLB,, | Performed by: STUDENT IN AN ORGANIZED HEALTH CARE EDUCATION/TRAINING PROGRAM

## 2023-01-30 PROCEDURE — 80053 COMPREHEN METABOLIC PANEL: CPT | Performed by: STUDENT IN AN ORGANIZED HEALTH CARE EDUCATION/TRAINING PROGRAM

## 2023-01-30 PROCEDURE — 36415 COLL VENOUS BLD VENIPUNCTURE: CPT | Mod: PO | Performed by: STUDENT IN AN ORGANIZED HEALTH CARE EDUCATION/TRAINING PROGRAM

## 2023-01-30 PROCEDURE — 99396 PREV VISIT EST AGE 40-64: CPT | Mod: S$GLB,,, | Performed by: STUDENT IN AN ORGANIZED HEALTH CARE EDUCATION/TRAINING PROGRAM

## 2023-01-30 PROCEDURE — 3074F PR MOST RECENT SYSTOLIC BLOOD PRESSURE < 130 MM HG: ICD-10-PCS | Mod: CPTII,S$GLB,, | Performed by: STUDENT IN AN ORGANIZED HEALTH CARE EDUCATION/TRAINING PROGRAM

## 2023-01-30 PROCEDURE — 83036 HEMOGLOBIN GLYCOSYLATED A1C: CPT | Performed by: STUDENT IN AN ORGANIZED HEALTH CARE EDUCATION/TRAINING PROGRAM

## 2023-01-30 PROCEDURE — 99999 PR PBB SHADOW E&M-EST. PATIENT-LVL IV: CPT | Mod: PBBFAC,,, | Performed by: STUDENT IN AN ORGANIZED HEALTH CARE EDUCATION/TRAINING PROGRAM

## 2023-01-30 PROCEDURE — 84443 ASSAY THYROID STIM HORMONE: CPT | Performed by: STUDENT IN AN ORGANIZED HEALTH CARE EDUCATION/TRAINING PROGRAM

## 2023-01-30 PROCEDURE — 3008F BODY MASS INDEX DOCD: CPT | Mod: CPTII,S$GLB,, | Performed by: STUDENT IN AN ORGANIZED HEALTH CARE EDUCATION/TRAINING PROGRAM

## 2023-01-30 PROCEDURE — 99999 PR PBB SHADOW E&M-EST. PATIENT-LVL IV: ICD-10-PCS | Mod: PBBFAC,,, | Performed by: STUDENT IN AN ORGANIZED HEALTH CARE EDUCATION/TRAINING PROGRAM

## 2023-01-30 PROCEDURE — 3074F SYST BP LT 130 MM HG: CPT | Mod: CPTII,S$GLB,, | Performed by: STUDENT IN AN ORGANIZED HEALTH CARE EDUCATION/TRAINING PROGRAM

## 2023-01-30 PROCEDURE — 1159F PR MEDICATION LIST DOCUMENTED IN MEDICAL RECORD: ICD-10-PCS | Mod: CPTII,S$GLB,, | Performed by: STUDENT IN AN ORGANIZED HEALTH CARE EDUCATION/TRAINING PROGRAM

## 2023-01-30 PROCEDURE — 1160F RVW MEDS BY RX/DR IN RCRD: CPT | Mod: CPTII,S$GLB,, | Performed by: STUDENT IN AN ORGANIZED HEALTH CARE EDUCATION/TRAINING PROGRAM

## 2023-01-30 NOTE — PROGRESS NOTES
"[facility] MEDICINE CLINIC NOTE    Patient Name: Leandra Lopes  YOB: 1965    PRESENTING HISTORY     History of Present Illness:  Ms. Leandra Lopes is a 57 y.o. female here for annual.     Back issues- following with [facility]. Ablation, didn;t completely work.     Started part time job tutoring. Walking, getting exercise, moving.     She lost weight with adipex but rebounded. Since starting job, moving more she has lost weight.     Fhx: father with DM2, CAD.  Social: Never smoker.     ROS      OBJECTIVE:   Vital Signs:  Vitals:    01/30/23 1505   BP: 112/80   Pulse: 76   Resp: 18   Temp: 98.6 °F (37 °C)   TempSrc: Oral   SpO2: 95%   Weight: 125.6 kg (276 lb 14.4 oz)   Height: 5' 7" (1.702 m)            Physical Exam  Vitals and nursing note reviewed.   Constitutional:       General: She is not in acute distress.     Appearance: She is obese. She is not toxic-appearing or diaphoretic.   HENT:      Head: Normocephalic and atraumatic.      Right Ear: External ear normal.      Left Ear: External ear normal.   Eyes:      General: No scleral icterus.     Conjunctiva/sclera: Conjunctivae normal.      Pupils: Pupils are equal, round, and reactive to light.   Neck:      Thyroid: No thyromegaly.      Vascular: No carotid bruit.   Cardiovascular:      Rate and Rhythm: Normal rate and regular rhythm.      Heart sounds: Normal heart sounds. No murmur heard.  Pulmonary:      Effort: Pulmonary effort is normal. No respiratory distress.      Breath sounds: Normal breath sounds. No wheezing or rales.   Musculoskeletal:         General: No tenderness or deformity. Normal range of motion.      Cervical back: Normal range of motion and neck supple.      Right lower leg: No edema.      Left lower leg: No edema.   Lymphadenopathy:      Cervical: No cervical adenopathy.   Skin:     General: Skin is warm and dry.      Findings: No erythema or rash.   Neurological:      Mental Status: She is alert and oriented to person, " place, and time.      Gait: Gait is intact.   Psychiatric:         Mood and Affect: Mood and affect normal.         Cognition and Memory: Memory normal.         Judgment: Judgment normal.       ASSESSMENT & PLAN:     Routine general medical examination at a health care facility  -     Lipid Panel; Future; Expected date: 01/30/2023  -     Comprehensive Metabolic Panel; Future; Expected date: 01/30/2023  -     Hemoglobin A1C; Future; Expected date: 01/30/2023  -     TSH; Future; Expected date: 01/30/2023    Class 3 severe obesity without serious comorbidity in adult, unspecified BMI, unspecified obesity type  Improved. Stabilized at 275.       Fhx of CAD. If interested we can pursue Coronary CT in future. She wants to hold off for now due to costs. I think her risk is low so this is very much elective.          Baldev Albert MD   Internal Medicine

## 2023-01-31 LAB
ALBUMIN SERPL BCP-MCNC: 4.1 G/DL (ref 3.5–5.2)
ALP SERPL-CCNC: 125 U/L (ref 55–135)
ALT SERPL W/O P-5'-P-CCNC: 17 U/L (ref 10–44)
ANION GAP SERPL CALC-SCNC: 10 MMOL/L (ref 8–16)
AST SERPL-CCNC: 20 U/L (ref 10–40)
BILIRUB SERPL-MCNC: 0.4 MG/DL (ref 0.1–1)
BUN SERPL-MCNC: 19 MG/DL (ref 6–20)
CALCIUM SERPL-MCNC: 10.1 MG/DL (ref 8.7–10.5)
CHLORIDE SERPL-SCNC: 102 MMOL/L (ref 95–110)
CHOLEST SERPL-MCNC: 200 MG/DL (ref 120–199)
CHOLEST/HDLC SERPL: 4.9 {RATIO} (ref 2–5)
CO2 SERPL-SCNC: 26 MMOL/L (ref 23–29)
CREAT SERPL-MCNC: 0.7 MG/DL (ref 0.5–1.4)
EST. GFR  (NO RACE VARIABLE): >60 ML/MIN/1.73 M^2
ESTIMATED AVG GLUCOSE: 103 MG/DL (ref 68–131)
GLUCOSE SERPL-MCNC: 86 MG/DL (ref 70–110)
HBA1C MFR BLD: 5.2 % (ref 4–5.6)
HDLC SERPL-MCNC: 41 MG/DL (ref 40–75)
HDLC SERPL: 20.5 % (ref 20–50)
LDLC SERPL CALC-MCNC: 116 MG/DL (ref 63–159)
NONHDLC SERPL-MCNC: 159 MG/DL
POTASSIUM SERPL-SCNC: 3.7 MMOL/L (ref 3.5–5.1)
PROT SERPL-MCNC: 7.5 G/DL (ref 6–8.4)
SODIUM SERPL-SCNC: 138 MMOL/L (ref 136–145)
TRIGL SERPL-MCNC: 215 MG/DL (ref 30–150)
TSH SERPL DL<=0.005 MIU/L-ACNC: 0.83 UIU/ML (ref 0.4–4)

## 2023-03-20 ENCOUNTER — TELEPHONE (OUTPATIENT)
Dept: FAMILY MEDICINE | Facility: CLINIC | Age: 58
End: 2023-03-20
Payer: COMMERCIAL

## 2023-03-20 NOTE — TELEPHONE ENCOUNTER
----- Message from Gracie Hoffman sent at 3/20/2023  8:44 AM CDT -----  Contact: EZEKIEL LUCIO [phone]  Type:  Same Day Appointment Request    Caller is requesting a same day appointment.  Caller declined first available appointment listed below.      Name of Caller:  EZEKIEL LUCIO    When is the first available appointment?  N/A    Best Call Back Number:  [phone] (home)     Additional Information:   Patient is calling office to speak with nurse/MA to schedule sooner appointment. Complains of bump on (R) hand.   Please call back and advise. Thanks

## 2023-05-22 ENCOUNTER — OFFICE VISIT (OUTPATIENT)
Dept: FAMILY MEDICINE | Facility: CLINIC | Age: 58
End: 2023-05-22
Payer: COMMERCIAL

## 2023-05-22 ENCOUNTER — LAB VISIT (OUTPATIENT)
Dept: LAB | Facility: HOSPITAL | Age: 58
End: 2023-05-22
Attending: FAMILY MEDICINE
Payer: COMMERCIAL

## 2023-05-22 VITALS
DIASTOLIC BLOOD PRESSURE: 72 MMHG | SYSTOLIC BLOOD PRESSURE: 118 MMHG | HEART RATE: 66 BPM | TEMPERATURE: 98 F | RESPIRATION RATE: 18 BRPM | OXYGEN SATURATION: 98 % | BODY MASS INDEX: 42.63 KG/M2 | WEIGHT: 271.63 LBS | HEIGHT: 67 IN

## 2023-05-22 DIAGNOSIS — R19.5 LOOSE STOOLS: Primary | ICD-10-CM

## 2023-05-22 DIAGNOSIS — R19.5 LOOSE STOOLS: ICD-10-CM

## 2023-05-22 PROCEDURE — 3008F PR BODY MASS INDEX (BMI) DOCUMENTED: ICD-10-PCS | Mod: CPTII,S$GLB,, | Performed by: FAMILY MEDICINE

## 2023-05-22 PROCEDURE — 87425 ROTAVIRUS AG IA: CPT | Performed by: FAMILY MEDICINE

## 2023-05-22 PROCEDURE — 87798 DETECT AGENT NOS DNA AMP: CPT | Performed by: FAMILY MEDICINE

## 2023-05-22 PROCEDURE — 1159F PR MEDICATION LIST DOCUMENTED IN MEDICAL RECORD: ICD-10-PCS | Mod: CPTII,S$GLB,, | Performed by: FAMILY MEDICINE

## 2023-05-22 PROCEDURE — 83993 ASSAY FOR CALPROTECTIN FECAL: CPT | Performed by: FAMILY MEDICINE

## 2023-05-22 PROCEDURE — 99213 PR OFFICE/OUTPT VISIT, EST, LEVL III, 20-29 MIN: ICD-10-PCS | Mod: S$GLB,,, | Performed by: FAMILY MEDICINE

## 2023-05-22 PROCEDURE — 87449 NOS EACH ORGANISM AG IA: CPT | Mod: 91 | Performed by: FAMILY MEDICINE

## 2023-05-22 PROCEDURE — 3008F BODY MASS INDEX DOCD: CPT | Mod: CPTII,S$GLB,, | Performed by: FAMILY MEDICINE

## 2023-05-22 PROCEDURE — 3074F SYST BP LT 130 MM HG: CPT | Mod: CPTII,S$GLB,, | Performed by: FAMILY MEDICINE

## 2023-05-22 PROCEDURE — 1159F MED LIST DOCD IN RCRD: CPT | Mod: CPTII,S$GLB,, | Performed by: FAMILY MEDICINE

## 2023-05-22 PROCEDURE — 99999 PR PBB SHADOW E&M-EST. PATIENT-LVL V: CPT | Mod: PBBFAC,,, | Performed by: FAMILY MEDICINE

## 2023-05-22 PROCEDURE — 87329 GIARDIA AG IA: CPT | Performed by: FAMILY MEDICINE

## 2023-05-22 PROCEDURE — 99213 OFFICE O/P EST LOW 20 MIN: CPT | Mod: S$GLB,,, | Performed by: FAMILY MEDICINE

## 2023-05-22 PROCEDURE — 87449 NOS EACH ORGANISM AG IA: CPT | Mod: 59 | Performed by: FAMILY MEDICINE

## 2023-05-22 PROCEDURE — 3044F PR MOST RECENT HEMOGLOBIN A1C LEVEL <7.0%: ICD-10-PCS | Mod: CPTII,S$GLB,, | Performed by: FAMILY MEDICINE

## 2023-05-22 PROCEDURE — 82653 EL-1 FECAL QUANTITATIVE: CPT | Performed by: FAMILY MEDICINE

## 2023-05-22 PROCEDURE — 87045 FECES CULTURE AEROBIC BACT: CPT | Performed by: FAMILY MEDICINE

## 2023-05-22 PROCEDURE — 87338 HPYLORI STOOL AG IA: CPT | Performed by: FAMILY MEDICINE

## 2023-05-22 PROCEDURE — 87427 SHIGA-LIKE TOXIN AG IA: CPT | Performed by: FAMILY MEDICINE

## 2023-05-22 PROCEDURE — 3078F PR MOST RECENT DIASTOLIC BLOOD PRESSURE < 80 MM HG: ICD-10-PCS | Mod: CPTII,S$GLB,, | Performed by: FAMILY MEDICINE

## 2023-05-22 PROCEDURE — 82705 FATS/LIPIDS FECES QUAL: CPT | Performed by: FAMILY MEDICINE

## 2023-05-22 PROCEDURE — 87177 OVA AND PARASITES SMEARS: CPT | Performed by: FAMILY MEDICINE

## 2023-05-22 PROCEDURE — 82272 OCCULT BLD FECES 1-3 TESTS: CPT | Performed by: FAMILY MEDICINE

## 2023-05-22 PROCEDURE — 3078F DIAST BP <80 MM HG: CPT | Mod: CPTII,S$GLB,, | Performed by: FAMILY MEDICINE

## 2023-05-22 PROCEDURE — 89055 LEUKOCYTE ASSESSMENT FECAL: CPT | Performed by: FAMILY MEDICINE

## 2023-05-22 PROCEDURE — 87046 STOOL CULTR AEROBIC BACT EA: CPT | Performed by: FAMILY MEDICINE

## 2023-05-22 PROCEDURE — 3044F HG A1C LEVEL LT 7.0%: CPT | Mod: CPTII,S$GLB,, | Performed by: FAMILY MEDICINE

## 2023-05-22 PROCEDURE — 99999 PR PBB SHADOW E&M-EST. PATIENT-LVL V: ICD-10-PCS | Mod: PBBFAC,,, | Performed by: FAMILY MEDICINE

## 2023-05-22 PROCEDURE — 87209 SMEAR COMPLEX STAIN: CPT | Performed by: FAMILY MEDICINE

## 2023-05-22 PROCEDURE — 3074F PR MOST RECENT SYSTOLIC BLOOD PRESSURE < 130 MM HG: ICD-10-PCS | Mod: CPTII,S$GLB,, | Performed by: FAMILY MEDICINE

## 2023-05-22 NOTE — PROGRESS NOTES
The patient location is:  [place]  The chief complaint leading to consultation is: Diarrhea and Abdominal Pain    Visit type: Virtual visit with synchronous audio and video  Total time spent with patient:  Less than 30 minutes  Each patient to whom he or she provides medical services by telemedicine is:  (1) informed of the relationship between the physician and patient and the respective role of any other health care provider with respect to management of the patient; and (2) notified that he or she may decline to receive medical services by telemedicine and may withdraw from such care at any time. Vital signs recorded were provided by the patient.    Notes:  See below    Subjective:   Patient ID: Leandra Lopes is a 57 y.o. female     Chief Complaint:Diarrhea and Abdominal Pain      Notes going on intermittenlty for months. Started this time again 4 days ago. Notes loose stool. Denies fever. Denies association with diet.    Diarrhea   Associated symptoms include abdominal pain.   Abdominal Pain  Associated symptoms include diarrhea. Pertinent negatives include no dysuria.   Review of Systems   Respiratory:  Negative for shortness of breath.    Cardiovascular:  Negative for chest pain.   Gastrointestinal:  Positive for abdominal pain and diarrhea.   Genitourinary:  Negative for dysuria.   Past Medical History:   Diagnosis Date    Breast CA     Breast cancer 2013    left breast infiltrating ductal carcinoma, ER/SC positive, Her2 negative    Difficult intravenous access     patient is a hard iv stick    Lung nodules     Needle phobia     Obese     SADIE (obstructive sleep apnea)     on cpap     Past Surgical History:   Procedure Laterality Date    BREAST BIOPSY  2013    left breast- [facility]    BREAST SURGERY  10//    bilateral mastectomy and reconstruction-no implants     SECTION      x3    CHOLECYSTECTOMY      COLON SURGERY  2009    benign neoplasm recheck 2014, 12 inches    EPIDURAL STEROID INJECTION  INTO LUMBAR SPINE N/A 11/26/2019    Procedure: Injection-steroid-epidural-lumbar;  Surgeon: Niko Meek MD;  Location: [facility] OR;  Service: Pain Management;  Laterality: N/A;  L2-L3    EPIDURAL STEROID INJECTION INTO LUMBAR SPINE N/A 10/20/2020    Procedure: Injection-steroid-epidural-lumbar;  Surgeon: Niko Meek MD;  Location: [facility] OR;  Service: Pain Management;  Laterality: N/A;  L2-3   Pt wanted to let ASC know she is a hard stick     HYSTERECTOMY      TVH/BSO, benign    TONSILLECTOMY      TUBAL LIGATION       Objective:     Vitals:    05/22/23 0911   BP: 118/72   Pulse: 66   Resp: 18   Temp: 98.3 °F (36.8 °C)     Body mass index is 42.54 kg/m².  Physical Exam  Vitals and nursing note reviewed.   Constitutional:       Appearance: She is well-developed.   HENT:      Head: Normocephalic and atraumatic.   Eyes:      General: No scleral icterus.     Conjunctiva/sclera: Conjunctivae normal.   Pulmonary:      Effort: Pulmonary effort is normal. No respiratory distress.   Musculoskeletal:         General: No deformity. Normal range of motion.      Cervical back: Normal range of motion and neck supple.   Skin:     Coloration: Skin is not pale.      Findings: No rash.   Neurological:      Mental Status: She is alert and oriented to person, place, and time.   Psychiatric:         Behavior: Behavior normal.         Thought Content: Thought content normal.         Judgment: Judgment normal.     Assessment:     1. Loose stools      Plan:   Loose stools  -     H. pylori antigen, stool; Future; Expected date: 05/22/2023  -     Pancreatic elastase, fecal; Future; Expected date: 05/22/2023  -     Fecal fat, qualitative; Future; Expected date: 05/22/2023  -     Occult blood x 1, stool; Future; Expected date: 05/22/2023  -     WBC, Stool; Future; Expected date: 05/22/2023  -     Rotavirus antigen, stool; Future; Expected date: 05/22/2023  -     Adenovirus Molecular Detection, PCR, Non-Blood Stool; Future; Expected date:  05/22/2023  -     Calprotectin, Stool; Future; Expected date: 05/22/2023  -     Giardia / Cryptosporidum, EIA; Future; Expected date: 05/22/2023  -     Stool Exam-Ova,Cysts,Parasites; Future; Expected date: 05/22/2023  -     Clostridium difficile EIA; Future; Expected date: 05/22/2023  -     Stool culture; Future; Expected date: 05/22/2023    Recommended probiotic and stool studies. Less concerned about acute infectious etiology based on symptoms and no recent travel.        Total time spent of Less than 30 minutes minutes on the day of the visit.This includes face to face time and preparing to see the patient, obtaining and reviewing separately obtained history, documenting clinical information in the electronic or other health record, independently interpreting results and communicating results to the patient/family/caregiver, or care coordinator.    Established patient with me has been instructed that must see me at least 1 time yearly (every 365 days) for refills of medications. Seeing other providers in this clinic is fine but expectation is to see me yearly.    Jere Carter MD  05/22/2023    Portions of this note have been dictated with ELDER Pelletier

## 2023-05-23 LAB
C DIFF GDH STL QL: NEGATIVE
C DIFF TOX A+B STL QL IA: NEGATIVE
CRYPTOSP AG STL QL IA: NEGATIVE
G LAMBLIA AG STL QL IA: NEGATIVE
OB PNL STL: NEGATIVE
RV AG STL QL IA.RAPID: NEGATIVE
WBC #/AREA STL HPF: NORMAL /[HPF]

## 2023-05-24 LAB
E COLI SXT1 STL QL IA: NEGATIVE
E COLI SXT2 STL QL IA: NEGATIVE
FAT STL QL: NORMAL
NEUTRAL FAT STL QL: NORMAL

## 2023-05-25 LAB
BACTERIA STL CULT: NORMAL
ELASTASE 1, FECAL: 148 MCG/G
HADV DNA SERPL QL NAA+PROBE: NEGATIVE
SPECIMEN SOURCE: NORMAL

## 2023-05-26 ENCOUNTER — TELEPHONE (OUTPATIENT)
Dept: FAMILY MEDICINE | Facility: CLINIC | Age: 58
End: 2023-05-26
Payer: COMMERCIAL

## 2023-05-26 DIAGNOSIS — R19.5 LOOSE STOOLS: Primary | ICD-10-CM

## 2023-05-30 LAB — CALPROTECTIN STL-MCNT: 68.5 MCG/G

## 2023-06-02 LAB — O+P STL MICRO: NORMAL

## 2023-06-04 LAB
H PYLORI AG STL QL IA: NOT DETECTED
SPECIMEN SOURCE: NORMAL

## 2023-07-10 DIAGNOSIS — R19.7 DIARRHEA: Primary | ICD-10-CM

## 2023-07-10 DIAGNOSIS — R10.12 ABDOMINAL PAIN, LEFT UPPER QUADRANT: ICD-10-CM

## 2023-07-10 DIAGNOSIS — Z86.010 PERSONAL HISTORY OF COLONIC POLYPS: ICD-10-CM

## 2023-07-14 ENCOUNTER — HOSPITAL ENCOUNTER (OUTPATIENT)
Dept: RADIOLOGY | Facility: HOSPITAL | Age: 58
Discharge: HOME | End: 2023-07-14
Attending: INTERNAL MEDICINE
Payer: COMMERCIAL

## 2023-07-14 DIAGNOSIS — R10.12 ABDOMINAL PAIN, LEFT UPPER QUADRANT: ICD-10-CM

## 2023-07-14 DIAGNOSIS — Z86.010 PERSONAL HISTORY OF COLONIC POLYPS: ICD-10-CM

## 2023-07-14 DIAGNOSIS — R19.7 DIARRHEA: ICD-10-CM

## 2023-07-14 PROCEDURE — 25500020 PHARM REV CODE 255: Mod: PO | Performed by: INTERNAL MEDICINE

## 2023-07-14 PROCEDURE — 74177 CT ABD & PELVIS W/CONTRAST: CPT | Mod: TC,PO

## 2023-07-14 RX ADMIN — IOHEXOL 100 ML: 350 INJECTION, SOLUTION INTRAVENOUS at 03:07

## 2023-12-14 ENCOUNTER — PATIENT MESSAGE (OUTPATIENT)
Dept: FAMILY MEDICINE | Facility: CLINIC | Age: 58
End: 2023-12-14
Payer: COMMERCIAL

## 2023-12-14 ENCOUNTER — OFFICE VISIT (OUTPATIENT)
Dept: FAMILY MEDICINE | Facility: CLINIC | Age: 58
End: 2023-12-14
Payer: COMMERCIAL

## 2023-12-14 VITALS
HEART RATE: 64 BPM | SYSTOLIC BLOOD PRESSURE: 120 MMHG | OXYGEN SATURATION: 99 % | BODY MASS INDEX: 42.26 KG/M2 | DIASTOLIC BLOOD PRESSURE: 68 MMHG | WEIGHT: 269.81 LBS

## 2023-12-14 DIAGNOSIS — R79.89 ELEVATED DHEA: Primary | ICD-10-CM

## 2023-12-14 PROCEDURE — 3074F PR MOST RECENT SYSTOLIC BLOOD PRESSURE < 130 MM HG: ICD-10-PCS | Mod: CPTII,S$GLB,, | Performed by: STUDENT IN AN ORGANIZED HEALTH CARE EDUCATION/TRAINING PROGRAM

## 2023-12-14 PROCEDURE — 3008F PR BODY MASS INDEX (BMI) DOCUMENTED: ICD-10-PCS | Mod: CPTII,S$GLB,, | Performed by: STUDENT IN AN ORGANIZED HEALTH CARE EDUCATION/TRAINING PROGRAM

## 2023-12-14 PROCEDURE — 99214 PR OFFICE/OUTPT VISIT, EST, LEVL IV, 30-39 MIN: ICD-10-PCS | Mod: S$GLB,,, | Performed by: STUDENT IN AN ORGANIZED HEALTH CARE EDUCATION/TRAINING PROGRAM

## 2023-12-14 PROCEDURE — 1160F PR REVIEW ALL MEDS BY PRESCRIBER/CLIN PHARMACIST DOCUMENTED: ICD-10-PCS | Mod: CPTII,S$GLB,, | Performed by: STUDENT IN AN ORGANIZED HEALTH CARE EDUCATION/TRAINING PROGRAM

## 2023-12-14 PROCEDURE — 3078F PR MOST RECENT DIASTOLIC BLOOD PRESSURE < 80 MM HG: ICD-10-PCS | Mod: CPTII,S$GLB,, | Performed by: STUDENT IN AN ORGANIZED HEALTH CARE EDUCATION/TRAINING PROGRAM

## 2023-12-14 PROCEDURE — 3008F BODY MASS INDEX DOCD: CPT | Mod: CPTII,S$GLB,, | Performed by: STUDENT IN AN ORGANIZED HEALTH CARE EDUCATION/TRAINING PROGRAM

## 2023-12-14 PROCEDURE — 3044F PR MOST RECENT HEMOGLOBIN A1C LEVEL <7.0%: ICD-10-PCS | Mod: CPTII,S$GLB,, | Performed by: STUDENT IN AN ORGANIZED HEALTH CARE EDUCATION/TRAINING PROGRAM

## 2023-12-14 PROCEDURE — 3074F SYST BP LT 130 MM HG: CPT | Mod: CPTII,S$GLB,, | Performed by: STUDENT IN AN ORGANIZED HEALTH CARE EDUCATION/TRAINING PROGRAM

## 2023-12-14 PROCEDURE — 3078F DIAST BP <80 MM HG: CPT | Mod: CPTII,S$GLB,, | Performed by: STUDENT IN AN ORGANIZED HEALTH CARE EDUCATION/TRAINING PROGRAM

## 2023-12-14 PROCEDURE — 99214 OFFICE O/P EST MOD 30 MIN: CPT | Mod: S$GLB,,, | Performed by: STUDENT IN AN ORGANIZED HEALTH CARE EDUCATION/TRAINING PROGRAM

## 2023-12-14 PROCEDURE — 1160F RVW MEDS BY RX/DR IN RCRD: CPT | Mod: CPTII,S$GLB,, | Performed by: STUDENT IN AN ORGANIZED HEALTH CARE EDUCATION/TRAINING PROGRAM

## 2023-12-14 PROCEDURE — 99999 PR PBB SHADOW E&M-EST. PATIENT-LVL III: CPT | Mod: PBBFAC,,, | Performed by: STUDENT IN AN ORGANIZED HEALTH CARE EDUCATION/TRAINING PROGRAM

## 2023-12-14 PROCEDURE — 3044F HG A1C LEVEL LT 7.0%: CPT | Mod: CPTII,S$GLB,, | Performed by: STUDENT IN AN ORGANIZED HEALTH CARE EDUCATION/TRAINING PROGRAM

## 2023-12-14 PROCEDURE — 1159F PR MEDICATION LIST DOCUMENTED IN MEDICAL RECORD: ICD-10-PCS | Mod: CPTII,S$GLB,, | Performed by: STUDENT IN AN ORGANIZED HEALTH CARE EDUCATION/TRAINING PROGRAM

## 2023-12-14 PROCEDURE — 99999 PR PBB SHADOW E&M-EST. PATIENT-LVL III: ICD-10-PCS | Mod: PBBFAC,,, | Performed by: STUDENT IN AN ORGANIZED HEALTH CARE EDUCATION/TRAINING PROGRAM

## 2023-12-14 PROCEDURE — 1159F MED LIST DOCD IN RCRD: CPT | Mod: CPTII,S$GLB,, | Performed by: STUDENT IN AN ORGANIZED HEALTH CARE EDUCATION/TRAINING PROGRAM

## 2023-12-14 RX ORDER — PANCRELIPASE 24000; 76000; 120000 [USP'U]/1; [USP'U]/1; [USP'U]/1
1 CAPSULE, DELAYED RELEASE PELLETS ORAL DAILY
COMMUNITY
Start: 2023-10-01

## 2023-12-14 RX ORDER — METRONIDAZOLE 10 MG/G
1 GEL TOPICAL
COMMUNITY
Start: 2023-11-27

## 2023-12-14 NOTE — PROGRESS NOTES
[facility] CLINIC NOTE    Patient Name: Leandra Lopes  YOB: 1965    PRESENTING HISTORY     History of Present Illness:  Ms. Leandra Lopes is a 58 y.o. female here for f/u hair loss.     Went to dermatologist for hair loss. Onset about 3 months.    Down middle of head.   Got CBC, CMP, TSH, Sed rate, testosterone, dhea sulfate  Did not get Aj, TIBC due to cost. All within normal limits except told to f/u with me re DHEA level    Her dermatologist was concerned about DHEA levels, 277 (no reference level included). It appears this is a midly to moderately elevated level.    Pertinent medical hx  Breast cancer- 2013 found to have stage 1 breast cancer. Residual margins, opted for bilateral mastectomy. Was on estrogen deprivation post operatively for 5 years.  She had hysterectomy/oophorectomy 2014 due to breast cancer as a precaution. Unsure the details of this.    Sees Dr. Page,. Recent scan of some kind, possibly PET with no recurrence       Markus growth on the chin and upper lip for the last 6 months. Has needed to shave daily. Used to have much milder symptoms.     No abnormal muscle growth or clitoral virilization reported.     Does admit to significant stress. Works two jobs. Daughter with severe anxiety who recently graduated from college.     ROS      OBJECTIVE:   Vital Signs:  Vitals:    12/14/23 1333   BP: 120/68   Pulse: 64   SpO2: 99%   Weight: 122.4 kg (269 lb 13.5 oz)            Physical Exam  Vitals and nursing note reviewed.   Constitutional:       Appearance: She is obese. She is not diaphoretic.   HENT:      Head: Normocephalic and atraumatic.   Eyes:      General: No scleral icterus.     Conjunctiva/sclera: Conjunctivae normal.      Pupils: Pupils are equal, round, and reactive to light.   Neck:      Thyroid: No thyromegaly.   Cardiovascular:      Rate and Rhythm: Normal rate and regular rhythm.      Heart sounds: Normal heart sounds. No murmur heard.  Pulmonary:       Effort: Pulmonary effort is normal. No respiratory distress.      Breath sounds: Normal breath sounds. No wheezing or rales.   Musculoskeletal:         General: No tenderness or deformity. Normal range of motion.      Cervical back: Normal range of motion and neck supple.   Lymphadenopathy:      Cervical: No cervical adenopathy.   Skin:     Comments: Mildly prominent area of alopecia down midline of scalp at part. No sig vertex alopecia.   Face smooth c/w recent shaving. No terminal hair seen   Neurological:      Mental Status: She is alert and oriented to person, place, and time.      Gait: Gait is intact.   Psychiatric:         Mood and Affect: Mood and affect normal.         Cognition and Memory: Memory normal.         Judgment: Judgment normal.         ASSESSMENT & PLAN:     Elevated DHEA  -     TESTOSTERONE PANEL; Future; Expected date: 12/14/2023  -     ACTH; Future; Expected date: 12/14/2023  -     17-Hydroxyprogesterone; Future; Expected date: 12/28/2023  -     PROLACTIN; Future; Expected date: 12/14/2023  -     Cortisol, Urine, Free; Future; Expected date: 12/14/2023  -     Creatinine, urine, timed 24 Hours; Future; Expected date: 12/14/2023      Discussed with patient that we will likely need endocrine assistance but limited availability so will likely pursue E-consult which she agrees to.     She sent me test results and I don't see any of the above.   Start with these then will consider.          Baldev Albert  Internal Medicine    I spent a total of 34 minutes on the day of the visit.  This includes face-to-face time and non face-to-face time preparing to see the patient (e.g., review of tests) , obtaining and/or reviewing separately obtain history, documenting clinical information in the electronic or other health record, independently interpreting results and communicating results to the patient/family/caregiver, or care coordinator.

## 2023-12-27 ENCOUNTER — OFFICE VISIT (OUTPATIENT)
Dept: URGENT CARE | Facility: CLINIC | Age: 58
End: 2023-12-27
Payer: COMMERCIAL

## 2023-12-27 VITALS
BODY MASS INDEX: 41.59 KG/M2 | WEIGHT: 265 LBS | DIASTOLIC BLOOD PRESSURE: 69 MMHG | RESPIRATION RATE: 16 BRPM | HEART RATE: 104 BPM | OXYGEN SATURATION: 96 % | HEIGHT: 67 IN | SYSTOLIC BLOOD PRESSURE: 117 MMHG | TEMPERATURE: 101 F

## 2023-12-27 DIAGNOSIS — J11.1 FLU: Primary | ICD-10-CM

## 2023-12-27 DIAGNOSIS — R50.9 FEVER, UNSPECIFIED FEVER CAUSE: ICD-10-CM

## 2023-12-27 DIAGNOSIS — R05.9 COUGH, UNSPECIFIED TYPE: ICD-10-CM

## 2023-12-27 LAB
CTP QC/QA: YES
FLUAV AG NPH QL: POSITIVE
FLUBV AG NPH QL: NEGATIVE

## 2023-12-27 PROCEDURE — S0119 ONDANSETRON 4 MG: HCPCS | Mod: S$GLB,,,

## 2023-12-27 PROCEDURE — 99214 PR OFFICE/OUTPT VISIT, EST, LEVL IV, 30-39 MIN: ICD-10-PCS | Mod: 25,S$GLB,,

## 2023-12-27 PROCEDURE — 99214 OFFICE O/P EST MOD 30 MIN: CPT | Mod: 25,S$GLB,,

## 2023-12-27 PROCEDURE — 87804 POCT INFLUENZA A/B: ICD-10-PCS | Mod: QW,,,

## 2023-12-27 PROCEDURE — 87804 INFLUENZA ASSAY W/OPTIC: CPT | Mod: QW,,,

## 2023-12-27 PROCEDURE — S0119 PR ONDANSETRON, ORAL, 4MG: ICD-10-PCS | Mod: S$GLB,,,

## 2023-12-27 RX ORDER — ONDANSETRON 4 MG/1
4 TABLET, ORALLY DISINTEGRATING ORAL
Status: COMPLETED | OUTPATIENT
Start: 2023-12-27 | End: 2023-12-27

## 2023-12-27 RX ORDER — ONDANSETRON 4 MG/1
4 TABLET, ORALLY DISINTEGRATING ORAL EVERY 6 HOURS PRN
Qty: 20 TABLET | Refills: 0 | Status: SHIPPED | OUTPATIENT
Start: 2023-12-27

## 2023-12-27 RX ORDER — BENZONATATE 100 MG/1
100 CAPSULE ORAL 3 TIMES DAILY PRN
Qty: 30 CAPSULE | Refills: 0 | Status: SHIPPED | OUTPATIENT
Start: 2023-12-27 | End: 2024-01-06

## 2023-12-27 RX ORDER — ALBUTEROL SULFATE 90 UG/1
2 AEROSOL, METERED RESPIRATORY (INHALATION) EVERY 6 HOURS PRN
Qty: 6.7 G | Refills: 0 | Status: SHIPPED | OUTPATIENT
Start: 2023-12-27 | End: 2024-01-26

## 2023-12-27 RX ORDER — ACETAMINOPHEN 500 MG
1000 TABLET ORAL
Status: COMPLETED | OUTPATIENT
Start: 2023-12-27 | End: 2023-12-27

## 2023-12-27 RX ORDER — OSELTAMIVIR PHOSPHATE 75 MG/1
75 CAPSULE ORAL 2 TIMES DAILY
Qty: 10 CAPSULE | Refills: 0 | Status: SHIPPED | OUTPATIENT
Start: 2023-12-27 | End: 2024-01-01

## 2023-12-27 RX ADMIN — ONDANSETRON 4 MG: 4 TABLET, ORALLY DISINTEGRATING ORAL at 08:12

## 2023-12-27 RX ADMIN — Medication 1000 MG: at 08:12

## 2023-12-27 NOTE — PROGRESS NOTES
"Subjective:      Patient ID: Leandra Lopes is a 58 y.o. female.    Vitals:  height is 5' 7" (1.702 m) and weight is 120.2 kg (265 lb). Her oral temperature is 100.5 °F (38.1 °C) (abnormal). Her blood pressure is 117/69 and her pulse is 104. Her respiration is 16 and oxygen saturation is 96%.     Chief Complaint: Cough    Cough, congestion, sore throat, body aches, headache and nausea X 3 days.  Direct flu exposure.  Received annual influenza vaccination    Cough  This is a new problem. The current episode started in the past 7 days. The problem has been gradually worsening. Associated symptoms include a fever, headaches, myalgias, nasal congestion, postnasal drip and a sore throat. She has tried OTC cough suppressant (Robitussin) for the symptoms.       Constitution: Positive for fatigue and fever.   HENT:  Positive for congestion, postnasal drip and sore throat.    Neck: neck negative.   Cardiovascular: Negative.    Eyes: Negative.    Respiratory:  Positive for cough and sputum production.    Gastrointestinal:  Positive for nausea. Negative for abdominal bloating, vomiting, constipation and diarrhea.   Endocrine: negative.   Genitourinary: Negative.    Musculoskeletal:  Positive for muscle ache.   Skin: Negative.    Allergic/Immunologic: Positive for immunizations up-to-date and flu shot.   Neurological:  Positive for headaches.   Hematologic/Lymphatic: Negative.    Psychiatric/Behavioral: Negative.        Objective:     Physical Exam   Constitutional: She is oriented to person, place, and time. She appears well-developed. She is cooperative.  Non-toxic appearance. She does not appear ill. No distress. obesity  HENT:   Head: Normocephalic and atraumatic.   Ears:   Right Ear: Hearing, tympanic membrane, external ear and ear canal normal.   Left Ear: Hearing, tympanic membrane, external ear and ear canal normal.   Nose: Congestion present. No mucosal edema or nasal deformity. No epistaxis. Right sinus exhibits no " maxillary sinus tenderness and no frontal sinus tenderness. Left sinus exhibits no maxillary sinus tenderness and no frontal sinus tenderness.   Mouth/Throat: Uvula is midline and mucous membranes are normal. Mucous membranes are moist. No trismus in the jaw. Normal dentition. No uvula swelling. Posterior oropharyngeal erythema present. No oropharyngeal exudate. Oropharynx is clear.   Eyes: Conjunctivae and lids are normal. Pupils are equal, round, and reactive to light. Extraocular movement intact   Neck: Trachea normal. Neck supple.   Cardiovascular: Normal rate, regular rhythm, normal heart sounds and normal pulses.   Pulmonary/Chest: Effort normal and breath sounds normal.   Abdominal: Normal appearance. Soft. flat abdomen There is no abdominal tenderness.   Musculoskeletal: Normal range of motion.         General: Normal range of motion.   Neurological: no focal deficit. She is alert, oriented to person, place, and time and at baseline. She exhibits normal muscle tone.   Skin: Skin is warm, dry, intact and not diaphoretic. Capillary refill takes 2 to 3 seconds.   Psychiatric: Her speech is normal and behavior is normal. Mood, judgment and thought content normal.   Nursing note and vitals reviewed.      Assessment:     1. Flu    2. Cough, unspecified type    3. Fever, unspecified fever cause        Plan:       Flu  -     ondansetron disintegrating tablet 4 mg  -     benzonatate (TESSALON) 100 MG capsule; Take 1 capsule (100 mg total) by mouth 3 (three) times daily as needed for Cough.  Dispense: 30 capsule; Refill: 0  -     ondansetron (ZOFRAN-ODT) 4 MG TbDL; Take 1 tablet (4 mg total) by mouth every 6 (six) hours as needed (nausea).  Dispense: 20 tablet; Refill: 0  -     albuterol (PROVENTIL HFA) 90 mcg/actuation inhaler; Inhale 2 puffs into the lungs every 6 (six) hours as needed for Wheezing. Rescue  Dispense: 6.7 g; Refill: 0    Cough, unspecified type  -     POCT Influenza A/B Rapid Antigen    Fever,  unspecified fever cause  -     acetaminophen tablet 1,000 mg

## 2023-12-27 NOTE — PATIENT INSTRUCTIONS
Begin Tamiflu immediately.  This medication may cause nausea do not take on an empty stomach.  May also take with Zofran.  Use OTC meds and prescribed Medications to treat symptoms.

## 2024-01-02 ENCOUNTER — OFFICE VISIT (OUTPATIENT)
Dept: OPTOMETRY | Facility: CLINIC | Age: 59
End: 2024-01-02
Payer: COMMERCIAL

## 2024-01-02 DIAGNOSIS — H43.393 VITREOUS FLOATERS, BILATERAL: ICD-10-CM

## 2024-01-02 DIAGNOSIS — H52.03 HYPEROPIA OF BOTH EYES WITH ASTIGMATISM AND PRESBYOPIA: ICD-10-CM

## 2024-01-02 DIAGNOSIS — H10.829 ROSACEA BLEPHAROCONJUNCTIVITIS: ICD-10-CM

## 2024-01-02 DIAGNOSIS — Z13.5 GLAUCOMA SCREENING: ICD-10-CM

## 2024-01-02 DIAGNOSIS — H52.203 HYPEROPIA OF BOTH EYES WITH ASTIGMATISM AND PRESBYOPIA: ICD-10-CM

## 2024-01-02 DIAGNOSIS — H52.4 HYPEROPIA OF BOTH EYES WITH ASTIGMATISM AND PRESBYOPIA: ICD-10-CM

## 2024-01-02 DIAGNOSIS — H25.13 NUCLEAR SCLEROSIS, BILATERAL: Primary | ICD-10-CM

## 2024-01-02 PROCEDURE — 1159F MED LIST DOCD IN RCRD: CPT | Mod: CPTII,S$GLB,, | Performed by: OPTOMETRIST

## 2024-01-02 PROCEDURE — 92014 COMPRE OPH EXAM EST PT 1/>: CPT | Mod: S$GLB,,, | Performed by: OPTOMETRIST

## 2024-01-02 PROCEDURE — 99999 PR PBB SHADOW E&M-EST. PATIENT-LVL III: CPT | Mod: PBBFAC,,, | Performed by: OPTOMETRIST

## 2024-01-02 PROCEDURE — 92015 DETERMINE REFRACTIVE STATE: CPT | Mod: S$GLB,,, | Performed by: OPTOMETRIST

## 2024-01-02 NOTE — PROGRESS NOTES
HPI    Pt. Here for routine eye exam. DLS - 10/5/2021    Pt. States VA is becoming worse and feels OS is blurry. Has double vision   in os. Effects NV more than DV. Hard to drive at night, lights effect her   VA.    Not using gtts currently.    No F/F.   Last edited by Francisco Escobar MA on 1/2/2024  8:28 AM.            Assessment /Plan     For exam results, see Encounter Report.    Nuclear sclerosis, bilateral    Vitreous floaters, bilateral    Rosacea blepharoconjunctivitis    Glaucoma screening    Hyperopia of both eyes with astigmatism and presbyopia         1. Early NS / cortical changes OU  Vis sig, radha at night -- fhx of early onset cataracts, pt defers consult at this time     2. RD precautions given and reviewed. Patient knows to call/ message if any further changes in symptoms occur.  3. Long term bleph, currently no gross s/s   4. Not suspect c/d, could not obtain IOP, angles open  Low suspect  5. Updated specs for near only Rx     Discussed and educated patient on current findings /plan.  RTC 1 year, prn if any changes / issues    1/11/24  Pt wants referral for CE consult

## 2024-01-02 NOTE — PATIENT INSTRUCTIONS
"DRY EYES -- BURNING OR DARY SYMPTOMS:  Use Over The Counter artificial tears as needed for dry eye symptoms.   Some common brands include:  Systane, Optive, Refresh, and Thera-Tears.  These drops can be used as frequently as desired, but may be most helpful use during long periods of concentrated work.  For example, reading / working at the computer. Start with 3-4x per day.     Nighttime Ophthalmic gel or ointments are available: Refresh PM, Genteal, and Lacrilube.    Avoid drops that "get redness out" (Visine, Murine, Clear Eyes), as these may contain medication that could further irritate the eyes, especially with chronic use.    ALLERGY EYES -- ITCHING SYMPTOMS:  Over the counter medications include--Pataday, Zaditor, and Alaway.  Use as directed 1-2 drops daily for symptoms of itching / watering eyes.  These drops will not help for dry eye or exposure symptoms.    REDNESS RELIEF:  Lumify---is a good redness reliever that will not cause irritation if used chronically.       FLASHES / FLOATERS / POSTERIOR VITREOUS DETACHMENT    Call the clinic if you have any further changes in symptoms.  Including:  Increased numbers of floaters or flashing lights, dimness or darkness that moves through or stays constant in your vision, or any pain in the eye (s).    You may sometimes see small specks or clouds moving in your field of vision.  They are called FLOATERS.  You can often see them when looking at a plain background, like a blank wall or blue maricel.  Floaters are actually tiny clumps of gel or cells inside the VITREOUS, the clear jelly-like fluid that fills the inside of your eye.    While these objects look like they are in front of your eye, they are actually floating inside.  What you see are the shadows they cast on the RETINA, the nerve layer at the back of the eye that senses light and allows you to see.      POSTERIOR VITREOUS DETACHMENT    The appearance of new floaters may be alarming.  If you suddenly develop " new floaters, you should contact your eye care professional  right away.    The retina can tear if the shrinking vitreous pulls away from the wall of the eye.  This sometimes causes a small amount of bleeding in the eye that may appear as new floaters.    A torn retina is always a serious problem, since it can lead to a retinal detachment.  You should see your eye care professional as soon as possible if:    even one new floater appears suddenly;  you see sudden flashes of light;  you notice other symptoms, like the loss of side vision, or a curtain closes down in your vision        POSTERIOR VITREOUS DETACHMENT is more common for people who:    are nearsighted;  have had cataract surgery;  have had YAG laser surgery of the eye;  have had inflammation inside the eye;  are over age 60.      While some floaters may remain visible, many of them will fade over time and become less noticeable.  Even if you've had some floaters for years, you should have your eyes checked as soon as possible if you notice new ones.    FLASHING LIGHTS    When the vitreous gel rubs or pulls on the retina, you may see what look like flashing lights or lightning streaks.  These flashes can appear off and on for several weeks or months.      Some people experience flashes of light that appear as jagged lines or heat waves in both eyes, lasting 10-20 minutes.  These flashes are caused by a spasm of blood vessels in the brain, which is called a migraine.    If a headache follows these flashes, it's called a migraine headache.  If   no headache occurs, these flashes are called Ophthalmic or Ocular Migraine.          Early Cataracts--not visually significant for surgery consultation.    What Are Cataracts?  A clear lens in the eye focuses light. This lets the eye see images sharply. With age, the lens slowly becomes cloudy. The cloudy lens is a cataract. A cataract scatters light and makes it hard for the eye to focus. Cataracts often form in  both eyes. But one lens may cloud faster than the other.      The Aging of Your Lens    Your lens may cloud so slowly that you don`t notice any vision changes at first. But as the cataract gets worse, the eye has a harder time focusing. In early stages, glasses may help you see better. As the lens gets cloudier, your doctor may recommend surgery to restore your vision.

## 2024-01-11 ENCOUNTER — PATIENT MESSAGE (OUTPATIENT)
Dept: OPTOMETRY | Facility: CLINIC | Age: 59
End: 2024-01-11
Payer: COMMERCIAL

## 2024-03-22 ENCOUNTER — OFFICE VISIT (OUTPATIENT)
Dept: OPHTHALMOLOGY | Facility: CLINIC | Age: 59
End: 2024-03-22
Payer: COMMERCIAL

## 2024-03-22 DIAGNOSIS — H25.13 NUCLEAR SCLEROSIS, BILATERAL: ICD-10-CM

## 2024-03-22 PROCEDURE — 92136 OPHTHALMIC BIOMETRY: CPT | Mod: LT,S$GLB,, | Performed by: OPHTHALMOLOGY

## 2024-03-22 PROCEDURE — 99204 OFFICE O/P NEW MOD 45 MIN: CPT | Mod: S$GLB,,, | Performed by: OPHTHALMOLOGY

## 2024-03-22 PROCEDURE — 1160F RVW MEDS BY RX/DR IN RCRD: CPT | Mod: CPTII,S$GLB,, | Performed by: OPHTHALMOLOGY

## 2024-03-22 PROCEDURE — 1159F MED LIST DOCD IN RCRD: CPT | Mod: CPTII,S$GLB,, | Performed by: OPHTHALMOLOGY

## 2024-03-22 PROCEDURE — 99999 PR PBB SHADOW E&M-EST. PATIENT-LVL III: CPT | Mod: PBBFAC,,, | Performed by: OPHTHALMOLOGY

## 2024-03-22 RX ORDER — PREDNISOLONE ACETATE-GATIFLOXACIN-BROMFENAC .75; 5; 1 MG/ML; MG/ML; MG/ML
1 SUSPENSION/ DROPS OPHTHALMIC 3 TIMES DAILY
Qty: 5 ML | Refills: 3 | Status: SHIPPED | OUTPATIENT
Start: 2024-03-22

## 2024-03-22 RX ORDER — SODIUM CHLORIDE 0.9 % (FLUSH) 0.9 %
10 SYRINGE (ML) INJECTION
Status: SHIPPED | OUTPATIENT
Start: 2024-03-22

## 2024-03-22 RX ORDER — MOXIFLOXACIN 5 MG/ML
1 SOLUTION/ DROPS OPHTHALMIC
OUTPATIENT
Start: 2024-03-22

## 2024-03-22 RX ORDER — PHENYLEPHRINE HYDROCHLORIDE 100 MG/ML
1 SOLUTION/ DROPS OPHTHALMIC
OUTPATIENT
Start: 2024-03-22

## 2024-03-22 RX ORDER — CYCLOP/TROP/PROPA/PHEN/KET/WAT 1-1-0.1%
1 DROPS (EA) OPHTHALMIC (EYE)
OUTPATIENT
Start: 2024-03-22

## 2024-03-22 NOTE — PROGRESS NOTES
Assessment /Plan     For exam results, see Encounter Report.    Nuclear sclerosis, bilateral  -     Ambulatory referral/consult to Ophthalmology      Visually Significant Cataract: Patient reports decreased vision consistent with the clinical amount of lenticular opacity, which reaches the level of visual significance and affects activities of daily living.     Specifically, this patient describes difficulty with:  - driving safely at night  - reading road signs  - reading small print  - deciphering medicine bottles  - reading the newspaper  - using the phone  - reading texts     Risks, benefits, and alternatives to cataract surgery were discussed and the consent reviewed. IOL options were discussed, including ATIOLs and the associated side effects and additional patient cost associated with them.   IOL Selections:   Right eye  IOL: CNA0T0 20.0     Left eye  IOL: CNA0T0 19.0    Pt wishes to have LEFT eye done first.  After discussion of refractive cataract surgery options, patient has chosen traditional manual surgery and is satisfied with wearing bifocal glasses if necessary after surgery.

## 2024-04-12 ENCOUNTER — TELEPHONE (OUTPATIENT)
Dept: OPHTHALMOLOGY | Facility: CLINIC | Age: 59
End: 2024-04-12
Payer: COMMERCIAL

## 2024-04-12 DIAGNOSIS — H25.12 NUCLEAR SCLEROTIC CATARACT OF LEFT EYE: Primary | ICD-10-CM

## 2024-05-28 ENCOUNTER — TELEPHONE (OUTPATIENT)
Dept: OPHTHALMOLOGY | Facility: CLINIC | Age: 59
End: 2024-05-28
Payer: COMMERCIAL

## 2024-05-28 DIAGNOSIS — H25.11 NUCLEAR SCLEROTIC CATARACT OF RIGHT EYE: Primary | ICD-10-CM

## 2024-06-03 ENCOUNTER — TELEPHONE (OUTPATIENT)
Dept: OPHTHALMOLOGY | Facility: CLINIC | Age: 59
End: 2024-06-03
Payer: COMMERCIAL

## 2024-06-03 NOTE — TELEPHONE ENCOUNTER
Patient given arrival time of 9:00 am on Thursday June 6. Nothing to eat or drink after 11:59 pm. Start drops into the operative eye today. [address] [facility]

## 2024-06-05 NOTE — PRE-PROCEDURE INSTRUCTIONS
Patient reviewed on 6/5/2024.  Okay to proceed at [facility]. The following pre-procedure instructions and arrival time have been sent to patient portal for review.  Pt replied to portal message.   Dear Leandra     Below you will find basic pre-procedure instructions in preparation for your procedure on 6/6/24 with Dr. Gaston     You should have received your arrival time already from Dr's office.     - Nothing to eat or drink after midnight the night before your procedure until after your procedure, except AM meds with small sips of water.     - HOLD all oral Diabetic medications night before and morning of procedure  - HOLD all Insulin morning of procedure  - HOLD all Fluid pills morning of procedure  - HOLD all non-insulin shots until after surgery (Ozempic, Mounjaro, Trulicity, Victoza, Byetta, Wegovy and Adlyxin) (7 days prior)  - HOLD all vitamins, minerals and herbal supplements morning of procedure   - TAKE all B/P meds, EXCEPT those that contain a fluid pill (ex. Lasix, Hydroclorothiazide/HCTZ, Spirnolactone)  - USE inhalers as needed and bring AM of surgery  - USE EYE DROPS as directed  -TAKE blood thinner meds AM of surgery unless otherwise instructed by your provider to not take     - Shower and wash face with antibacterial soap (ex. Dial) for 3 mins PM prior and AM of surgery  - No powder, lotions, creams, oils, gels, ointments, makeup,  or jewelry    - Wear comfortable clothing (button up shirt)     (Patient is required to have a responsible ride to transport home, ride may not leave while patient is in surgery)     -- [facility], 2nd floor Surgery Center, located   @ [address] [place] [address]  2nd Floor Registration        If you have any questions or concerns please feel free to contact your surgeon's office.           Please reply to this message as receipt of delivery.     RUTH ANN Miranda  [facility]  Pre-Admit - Anesthesia Dept

## 2024-06-06 ENCOUNTER — HOSPITAL ENCOUNTER (OUTPATIENT)
Facility: HOSPITAL | Age: 59
Discharge: HOME | End: 2024-06-06
Attending: OPHTHALMOLOGY | Admitting: OPHTHALMOLOGY
Payer: COMMERCIAL

## 2024-06-06 VITALS
RESPIRATION RATE: 16 BRPM | DIASTOLIC BLOOD PRESSURE: 69 MMHG | SYSTOLIC BLOOD PRESSURE: 136 MMHG | OXYGEN SATURATION: 100 % | TEMPERATURE: 98 F | HEART RATE: 68 BPM

## 2024-06-06 DIAGNOSIS — H25.10 SENILE NUCLEAR SCLEROSIS: ICD-10-CM

## 2024-06-06 DIAGNOSIS — H25.12 NUCLEAR SCLEROTIC CATARACT OF LEFT EYE: Primary | ICD-10-CM

## 2024-06-06 PROCEDURE — 36000706: Performed by: OPHTHALMOLOGY

## 2024-06-06 PROCEDURE — 66984 XCAPSL CTRC RMVL W/O ECP: CPT | Mod: LT,,, | Performed by: OPHTHALMOLOGY

## 2024-06-06 PROCEDURE — 99900035 HC TECH TIME PER 15 MIN (STAT)

## 2024-06-06 PROCEDURE — 94761 N-INVAS EAR/PLS OXIMETRY MLT: CPT | Mod: 59

## 2024-06-06 PROCEDURE — V2632 POST CHMBR INTRAOCULAR LENS: HCPCS | Performed by: OPHTHALMOLOGY

## 2024-06-06 PROCEDURE — 36000707: Performed by: OPHTHALMOLOGY

## 2024-06-06 PROCEDURE — 25000003 PHARM REV CODE 250: Performed by: OPHTHALMOLOGY

## 2024-06-06 PROCEDURE — 99152 MOD SED SAME PHYS/QHP 5/>YRS: CPT | Performed by: OPHTHALMOLOGY

## 2024-06-06 PROCEDURE — 63600175 PHARM REV CODE 636 W HCPCS: Performed by: OPHTHALMOLOGY

## 2024-06-06 PROCEDURE — 71000015 HC POSTOP RECOV 1ST HR: Performed by: OPHTHALMOLOGY

## 2024-06-06 DEVICE — LENS CLAREON AUTONOME 19.0D: Type: IMPLANTABLE DEVICE | Site: EYE | Status: FUNCTIONAL

## 2024-06-06 RX ORDER — MOXIFLOXACIN 5 MG/ML
1 SOLUTION/ DROPS OPHTHALMIC
Status: COMPLETED | OUTPATIENT
Start: 2024-06-06 | End: 2024-06-06

## 2024-06-06 RX ORDER — TETRACAINE HYDROCHLORIDE 5 MG/ML
1 SOLUTION OPHTHALMIC
Status: COMPLETED | OUTPATIENT
Start: 2024-06-06 | End: 2024-06-06

## 2024-06-06 RX ORDER — MIDAZOLAM HYDROCHLORIDE 1 MG/ML
1 INJECTION, SOLUTION INTRAMUSCULAR; INTRAVENOUS
Status: DISCONTINUED | OUTPATIENT
Start: 2024-06-06 | End: 2024-06-06 | Stop reason: HOSPADM

## 2024-06-06 RX ORDER — ACETAMINOPHEN 325 MG/1
650 TABLET ORAL EVERY 4 HOURS PRN
Status: DISCONTINUED | OUTPATIENT
Start: 2024-06-06 | End: 2024-06-06 | Stop reason: HOSPADM

## 2024-06-06 RX ORDER — MOXIFLOXACIN 5 MG/ML
SOLUTION/ DROPS OPHTHALMIC
Status: DISCONTINUED | OUTPATIENT
Start: 2024-06-06 | End: 2024-06-06 | Stop reason: HOSPADM

## 2024-06-06 RX ORDER — PROPARACAINE HYDROCHLORIDE 5 MG/ML
SOLUTION/ DROPS OPHTHALMIC
Status: DISCONTINUED | OUTPATIENT
Start: 2024-06-06 | End: 2024-06-06 | Stop reason: HOSPADM

## 2024-06-06 RX ORDER — LIDOCAINE HYDROCHLORIDE 40 MG/ML
INJECTION, SOLUTION RETROBULBAR
Status: DISCONTINUED | OUTPATIENT
Start: 2024-06-06 | End: 2024-06-06 | Stop reason: HOSPADM

## 2024-06-06 RX ORDER — PHENYLEPHRINE HYDROCHLORIDE 25 MG/ML
1 SOLUTION/ DROPS OPHTHALMIC
Status: COMPLETED | OUTPATIENT
Start: 2024-06-06 | End: 2024-06-06

## 2024-06-06 RX ORDER — PHENYLEPHRINE HYDROCHLORIDE 100 MG/ML
1 SOLUTION/ DROPS OPHTHALMIC
Status: DISCONTINUED | OUTPATIENT
Start: 2024-06-06 | End: 2024-06-06 | Stop reason: HOSPADM

## 2024-06-06 RX ORDER — TROPICAMIDE 10 MG/ML
1 SOLUTION/ DROPS OPHTHALMIC
Status: COMPLETED | OUTPATIENT
Start: 2024-06-06 | End: 2024-06-06

## 2024-06-06 RX ORDER — PROPARACAINE HYDROCHLORIDE 5 MG/ML
1 SOLUTION/ DROPS OPHTHALMIC
Status: DISCONTINUED | OUTPATIENT
Start: 2024-06-06 | End: 2024-06-06 | Stop reason: HOSPADM

## 2024-06-06 RX ADMIN — MOXIFLOXACIN OPHTHALMIC 1 DROP: 5 SOLUTION/ DROPS OPHTHALMIC at 09:06

## 2024-06-06 RX ADMIN — TROPICAMIDE 1 DROP: 10 SOLUTION/ DROPS OPHTHALMIC at 09:06

## 2024-06-06 RX ADMIN — PHENYLEPHRINE HYDROCHLORIDE 1 DROP: 25 SOLUTION/ DROPS OPHTHALMIC at 09:06

## 2024-06-06 RX ADMIN — MOXIFLOXACIN 1 DROP: 5 SOLUTION/ DROPS OPHTHALMIC at 10:06

## 2024-06-06 RX ADMIN — TETRACAINE HYDROCHLORIDE 1 DROP: 5 SOLUTION OPHTHALMIC at 09:06

## 2024-06-06 RX ADMIN — MIDAZOLAM HYDROCHLORIDE 2 MG: 1 INJECTION, SOLUTION INTRAMUSCULAR; INTRAVENOUS at 10:06

## 2024-06-06 NOTE — DISCHARGE SUMMARY
Outcome: Successful outpatient ophthalmic surgical procedure  Preprinted Instructions given to patient.  Regular diet.  Activity: No restrictions  Meds: see Med Rec  Condition: stable  Follow up: 1 day with Dr Gaston  Disposition: Home  Diagnosis: s/p eye surgery  Date of discharge: 06/06/2024

## 2024-06-06 NOTE — OP NOTE
SURGEON:  Naman Gaston M.D.    PREOPERATIVE DIAGNOSIS:    Nuclear Sclerotic Cataract Left Eye    POSTOPERATIVE DIAGNOSIS:    Nuclear Sclerotic Cataract Left Eye    PROCEDURES:    Phacoemulsification with  intraocular lens, Left eye (93020)    DATE OF SURGERY: 06/06/2024    IMPLANT: [email or web address] 19.0    ANESTHESIA:  Moderate sedation with topical Lidocaine. Patient ID confirmed and re-evaluated the patient and anesthesia plan confirming it is suitable for the patient's condition and procedure.    COMPLICATIONS:  None    ESTIMATED BLOOD LOSS: None    SPECIMENS: None    INDICATIONS:    The patient has a history of painless progressive visual loss and difficulty with activities of daily living, which specifically include difficult driving at night due to glare and difficulty reading small print, secondary to cataract formation.  After a thorough discussion of the risks, benefits, and alternatives to cataract surgery, including, but not limited to, the rare risks of infection, retinal detachment, hemorrhage, need for additional surgery, loss of vision, and even loss of the eye, the patient voices understanding and desires to proceed.    DESCRIPTION OF PROCEDURE:    The patients IOL calculations were reviewed, and the lens selection confirmed.   After verification and marking of the proper eye in the preop holding area, the patient was brought to the operating room in supine position where the eye was prepped and draped in standard sterile fashion with 5% Betadine and a lid speculum placed in the eye.   Topical 4% Lidocaine was used in addition to the preoperative anesthesia and the procedure was begun by the creation of a paracentesis incision through which viscoelastic was used to fill the anterior chamber.  Next, a keratome blade was used to create a triplanar temporal clear corneal incision and a cystotome and Utrata forceps used to fashion a continuous curvilinear capsulorrhexis.  Hydrodissection was carried out using the  Gay hydrodissection cannula and the nucleus was found to be mobile.  Phacoemulsification of the nucleus was carried out using a quick chop technique, and all remaining epinuclear and cortical material was removed.  The eye was then reformed with Viscoelastic and the  intraocular lens was implanted into the capsular bag.  All remaining viscoelastics were removed from the eye and at the end of the case the pupil was round, the lens was well-centered within the capsular bag and all wounds were found to be water tight.  Drops of Vigamox and Pred Forte were instilled and a shield was placed over the eye. The patient will follow up with Dr. Gaston in the morning.

## 2024-06-06 NOTE — DISCHARGE INSTRUCTIONS
CATARACT SURGERY    POST-OPERATIVE INSTRUCTIONS    · Apply drops THREE times a day into operative eye for 30 days.    · DO NOT rub your eye    · Wear protective sunglasses during the day    · Resume moderate activity    · Bathe/shower/wash face normally    · DO NOT apply makeup around the operative eye for 1 week.         You should expect    - Blurry vision and halos for 24-48 hours    - Dilated pupil for 24-48 hours    - Scratchy feeling in the eye for 1-2 days    - Curved shadow in your peripheral vision for 2-3 weeks    - Occasional flickering of lights for up to 1 week    -If you experience severe pain or nausea, please call Dr Gaston or the on-call doctor at [phone]    - Plan to see Dr Gaston tomorrow .      [facility]    [address] [facility] [address]    ** Most patients can drive the next day, but if you do not feel comfortable driving, please arrange for transportation.

## 2024-06-06 NOTE — PLAN OF CARE
Pt in preop bay 36, VSS and meds given. Pt denies any open wounds on body or the use of any weight loss injections. Pt needs site marking, otherwise ready to roll.    Procedural consents verified with pt.

## 2024-06-07 ENCOUNTER — OFFICE VISIT (OUTPATIENT)
Dept: OPHTHALMOLOGY | Facility: CLINIC | Age: 59
End: 2024-06-07
Payer: COMMERCIAL

## 2024-06-07 DIAGNOSIS — H25.12 NUCLEAR SCLEROTIC CATARACT OF LEFT EYE: ICD-10-CM

## 2024-06-07 DIAGNOSIS — Z98.890 POST-OPERATIVE STATE: Primary | ICD-10-CM

## 2024-06-07 PROCEDURE — 99999 PR PBB SHADOW E&M-EST. PATIENT-LVL III: CPT | Mod: PBBFAC,,, | Performed by: OPHTHALMOLOGY

## 2024-06-07 PROCEDURE — 1159F MED LIST DOCD IN RCRD: CPT | Mod: CPTII,S$GLB,, | Performed by: OPHTHALMOLOGY

## 2024-06-07 PROCEDURE — 1160F RVW MEDS BY RX/DR IN RCRD: CPT | Mod: CPTII,S$GLB,, | Performed by: OPHTHALMOLOGY

## 2024-06-07 PROCEDURE — 99024 POSTOP FOLLOW-UP VISIT: CPT | Mod: S$GLB,,, | Performed by: OPHTHALMOLOGY

## 2024-06-07 NOTE — PROGRESS NOTES
HPI    DATE OF SURGERY: 06/06/2024     IMPLANT: [email or web address] 19.0    Patient is here today for one day PO OS. Patient denies pain and states   that va is doing well.    PGB OS TID   Last edited by Radha Lloyd on 6/7/2024  9:32 AM.            Assessment /Plan     For exam results, see Encounter Report.    Post-operative state    Nuclear sclerotic cataract of left eye      Slit lamp exam:  L/L: nl  K: clear, wound sealed  AC: 1+ cell  Lens: IOL centered and stable    POD1 s/p Phaco/IOL  Appropriate precautions and post op medications reviewed.  Patient instructed to call or come in if symptoms of redness, decreased vision, or pain are experienced.

## 2024-06-14 ENCOUNTER — OFFICE VISIT (OUTPATIENT)
Dept: OPHTHALMOLOGY | Facility: CLINIC | Age: 59
End: 2024-06-14
Payer: COMMERCIAL

## 2024-06-14 DIAGNOSIS — H25.12 NUCLEAR SCLEROSIS OF LEFT EYE: ICD-10-CM

## 2024-06-14 DIAGNOSIS — Z98.890 POST-OPERATIVE STATE: Primary | ICD-10-CM

## 2024-06-14 PROCEDURE — 1160F RVW MEDS BY RX/DR IN RCRD: CPT | Mod: CPTII,S$GLB,, | Performed by: OPHTHALMOLOGY

## 2024-06-14 PROCEDURE — 1159F MED LIST DOCD IN RCRD: CPT | Mod: CPTII,S$GLB,, | Performed by: OPHTHALMOLOGY

## 2024-06-14 PROCEDURE — 99024 POSTOP FOLLOW-UP VISIT: CPT | Mod: S$GLB,,, | Performed by: OPHTHALMOLOGY

## 2024-06-14 PROCEDURE — 99999 PR PBB SHADOW E&M-EST. PATIENT-LVL III: CPT | Mod: PBBFAC,,, | Performed by: OPHTHALMOLOGY

## 2024-06-14 RX ORDER — SODIUM CHLORIDE 0.9 % (FLUSH) 0.9 %
10 SYRINGE (ML) INJECTION
Status: SHIPPED | OUTPATIENT
Start: 2024-06-14

## 2024-06-14 RX ORDER — PHENYLEPHRINE HYDROCHLORIDE 100 MG/ML
1 SOLUTION/ DROPS OPHTHALMIC
OUTPATIENT
Start: 2024-06-14

## 2024-06-14 RX ORDER — MOXIFLOXACIN 5 MG/ML
1 SOLUTION/ DROPS OPHTHALMIC
OUTPATIENT
Start: 2024-06-14

## 2024-06-14 RX ORDER — MIDAZOLAM HYDROCHLORIDE 1 MG/ML
1 INJECTION, SOLUTION INTRAMUSCULAR; INTRAVENOUS
Status: SHIPPED | OUTPATIENT
Start: 2024-06-14

## 2024-06-14 RX ORDER — CYCLOP/TROP/PROPA/PHEN/KET/WAT 1-1-0.1%
1 DROPS (EA) OPHTHALMIC (EYE)
OUTPATIENT
Start: 2024-06-14

## 2024-06-14 NOTE — PROGRESS NOTES
HPI    06/06/2024 IMPLANT: [email or web address] 19.0 OS    Patient is here today for 1 week phaco OS. Vision looks good. No pain or   discomfort.     PMB TID OS  Last edited by Sil Garza on 6/14/2024  8:55 AM.            Assessment /Plan     For exam results, see Encounter Report.    Post-operative state    Nuclear sclerosis of left eye      Slit lamp exam:  L/L: nl  K: clear, wound sealed  AC: trace cell  Iris/Lens: IOL centered and stable    POW1 s/p phaco: Surgery healing well with no signs of infection or abnormal inflammation.    Patient wishes to proceed with surgery in the second eye. Risks, benefits, alternatives reviewed. IOL selection reviewed.     Right eye  IOL: CNA0T0 20.0

## 2024-07-12 ENCOUNTER — TELEPHONE (OUTPATIENT)
Dept: OPHTHALMOLOGY | Facility: CLINIC | Age: 59
End: 2024-07-12
Payer: COMMERCIAL

## 2024-07-12 ENCOUNTER — PATIENT MESSAGE (OUTPATIENT)
Dept: OPHTHALMOLOGY | Facility: CLINIC | Age: 59
End: 2024-07-12
Payer: COMMERCIAL

## 2024-07-12 NOTE — TELEPHONE ENCOUNTER
Patient given arrival time of 7:00 am on Thursday July 18 . Nothing to eat or drink after 11:59 pm.  Start drops into the operative eye Tuesday . [address] [facility]

## 2024-07-18 ENCOUNTER — HOSPITAL ENCOUNTER (OUTPATIENT)
Facility: HOSPITAL | Age: 59
Discharge: HOME | End: 2024-07-18
Attending: OPHTHALMOLOGY | Admitting: OPHTHALMOLOGY
Payer: COMMERCIAL

## 2024-07-18 VITALS
TEMPERATURE: 98 F | SYSTOLIC BLOOD PRESSURE: 114 MMHG | RESPIRATION RATE: 19 BRPM | OXYGEN SATURATION: 99 % | HEIGHT: 67 IN | HEART RATE: 69 BPM | DIASTOLIC BLOOD PRESSURE: 60 MMHG | WEIGHT: 265 LBS | BODY MASS INDEX: 41.59 KG/M2

## 2024-07-18 DIAGNOSIS — H25.13 NUCLEAR SCLEROSIS, BILATERAL: ICD-10-CM

## 2024-07-18 DIAGNOSIS — H25.11 NUCLEAR SCLEROTIC CATARACT OF RIGHT EYE: Primary | ICD-10-CM

## 2024-07-18 DIAGNOSIS — H25.12 NUCLEAR SCLEROSIS OF LEFT EYE: ICD-10-CM

## 2024-07-18 DIAGNOSIS — Z98.890 POST-OPERATIVE STATE: ICD-10-CM

## 2024-07-18 PROCEDURE — 99152 MOD SED SAME PHYS/QHP 5/>YRS: CPT | Mod: ,,, | Performed by: OPHTHALMOLOGY

## 2024-07-18 PROCEDURE — 25000003 PHARM REV CODE 250: Performed by: OPHTHALMOLOGY

## 2024-07-18 PROCEDURE — 66984 XCAPSL CTRC RMVL W/O ECP: CPT | Mod: 79,RT,, | Performed by: OPHTHALMOLOGY

## 2024-07-18 PROCEDURE — 36000707: Performed by: OPHTHALMOLOGY

## 2024-07-18 PROCEDURE — 63600175 PHARM REV CODE 636 W HCPCS: Performed by: OPHTHALMOLOGY

## 2024-07-18 PROCEDURE — V2632 POST CHMBR INTRAOCULAR LENS: HCPCS | Performed by: OPHTHALMOLOGY

## 2024-07-18 PROCEDURE — 99900035 HC TECH TIME PER 15 MIN (STAT)

## 2024-07-18 PROCEDURE — 36000706: Performed by: OPHTHALMOLOGY

## 2024-07-18 PROCEDURE — 71000015 HC POSTOP RECOV 1ST HR: Performed by: OPHTHALMOLOGY

## 2024-07-18 PROCEDURE — 94761 N-INVAS EAR/PLS OXIMETRY MLT: CPT

## 2024-07-18 DEVICE — LENS CLAREON AUTONOME 20.0D: Type: IMPLANTABLE DEVICE | Site: EYE | Status: FUNCTIONAL

## 2024-07-18 RX ORDER — MOXIFLOXACIN 5 MG/ML
1 SOLUTION/ DROPS OPHTHALMIC
Status: CANCELLED | OUTPATIENT
Start: 2024-07-18 | End: 2024-07-18

## 2024-07-18 RX ORDER — PHENYLEPHRINE HYDROCHLORIDE 25 MG/ML
1 SOLUTION/ DROPS OPHTHALMIC
Status: COMPLETED | OUTPATIENT
Start: 2024-07-18 | End: 2024-07-18

## 2024-07-18 RX ORDER — MOXIFLOXACIN 5 MG/ML
1 SOLUTION/ DROPS OPHTHALMIC
Status: COMPLETED | OUTPATIENT
Start: 2024-07-18 | End: 2024-07-18

## 2024-07-18 RX ORDER — CYCLOP/TROP/PROPA/PHEN/KET/WAT 1-1-0.1%
1 DROPS (EA) OPHTHALMIC (EYE)
Status: DISCONTINUED | OUTPATIENT
Start: 2024-07-18 | End: 2024-07-18

## 2024-07-18 RX ORDER — PHENYLEPHRINE HYDROCHLORIDE 100 MG/ML
1 SOLUTION/ DROPS OPHTHALMIC
Status: DISCONTINUED | OUTPATIENT
Start: 2024-07-18 | End: 2024-07-18 | Stop reason: HOSPADM

## 2024-07-18 RX ORDER — TROPICAMIDE 10 MG/ML
1 SOLUTION/ DROPS OPHTHALMIC
Status: COMPLETED | OUTPATIENT
Start: 2024-07-18 | End: 2024-07-18

## 2024-07-18 RX ORDER — PROPARACAINE HYDROCHLORIDE 5 MG/ML
1 SOLUTION/ DROPS OPHTHALMIC
Status: CANCELLED | OUTPATIENT
Start: 2024-07-18

## 2024-07-18 RX ORDER — MOXIFLOXACIN 5 MG/ML
1 SOLUTION/ DROPS OPHTHALMIC
Status: DISCONTINUED | OUTPATIENT
Start: 2024-07-18 | End: 2024-07-18 | Stop reason: HOSPADM

## 2024-07-18 RX ORDER — ACETAMINOPHEN 325 MG/1
650 TABLET ORAL EVERY 4 HOURS PRN
Status: CANCELLED | OUTPATIENT
Start: 2024-07-18

## 2024-07-18 RX ORDER — PROPARACAINE HYDROCHLORIDE 5 MG/ML
SOLUTION/ DROPS OPHTHALMIC
Status: DISCONTINUED | OUTPATIENT
Start: 2024-07-18 | End: 2024-07-18 | Stop reason: HOSPADM

## 2024-07-18 RX ORDER — MOXIFLOXACIN 5 MG/ML
SOLUTION/ DROPS OPHTHALMIC
Status: DISCONTINUED | OUTPATIENT
Start: 2024-07-18 | End: 2024-07-18 | Stop reason: HOSPADM

## 2024-07-18 RX ORDER — LIDOCAINE HYDROCHLORIDE 40 MG/ML
INJECTION, SOLUTION RETROBULBAR
Status: DISCONTINUED | OUTPATIENT
Start: 2024-07-18 | End: 2024-07-18 | Stop reason: HOSPADM

## 2024-07-18 RX ORDER — TETRACAINE HYDROCHLORIDE 5 MG/ML
1 SOLUTION OPHTHALMIC
Status: COMPLETED | OUTPATIENT
Start: 2024-07-18 | End: 2024-07-18

## 2024-07-18 RX ORDER — MIDAZOLAM HYDROCHLORIDE 1 MG/ML
2 INJECTION, SOLUTION INTRAMUSCULAR; INTRAVENOUS
Status: COMPLETED | OUTPATIENT
Start: 2024-07-18 | End: 2024-07-18

## 2024-07-18 RX ADMIN — TETRACAINE HYDROCHLORIDE 1 DROP: 5 SOLUTION OPHTHALMIC at 07:07

## 2024-07-18 RX ADMIN — MIDAZOLAM HYDROCHLORIDE 1 MG: 1 INJECTION, SOLUTION INTRAMUSCULAR; INTRAVENOUS at 09:07

## 2024-07-18 RX ADMIN — MOXIFLOXACIN 1 DROP: 5 SOLUTION/ DROPS OPHTHALMIC at 09:07

## 2024-07-18 RX ADMIN — TROPICAMIDE 1 DROP: 10 SOLUTION/ DROPS OPHTHALMIC at 07:07

## 2024-07-18 RX ADMIN — MOXIFLOXACIN OPHTHALMIC 1 DROP: 5 SOLUTION/ DROPS OPHTHALMIC at 07:07

## 2024-07-18 RX ADMIN — PHENYLEPHRINE HYDROCHLORIDE 1 DROP: 25 SOLUTION/ DROPS OPHTHALMIC at 07:07

## 2024-07-18 RX ADMIN — MIDAZOLAM HYDROCHLORIDE 2 MG: 1 INJECTION, SOLUTION INTRAMUSCULAR; INTRAVENOUS at 08:07

## 2024-07-18 NOTE — DISCHARGE SUMMARY
Outcome: Successful outpatient ophthalmic surgical procedure  Preprinted Instructions given to patient.  Regular diet.  Activity: No restrictions  Meds: see Med Rec  Condition: stable  Follow up: 1 day with Dr Gaston  Disposition: Home  Diagnosis: s/p eye surgery  Date of discharge: 07/18/2024

## 2024-07-18 NOTE — DISCHARGE INSTRUCTIONS
CATARACT SURGERY    POST-OPERATIVE INSTRUCTIONS    · Apply drops THREE times a day into operative eye for 30 days.    · DO NOT rub your eye    · Wear protective sunglasses during the day    · Resume moderate activity    · Bathe/shower/wash face normally    · DO NOT apply makeup around the operative eye for 1 week.         You should expect    - Blurry vision and halos for 24-48 hours    - Dilated pupil for 24-48 hours    - Scratchy feeling in the eye for 1-2 days    - Curved shadow in your peripheral vision for 2-3 weeks    - Occasional flickering of lights for up to 1 week    -If you experience severe pain or nausea, please call Dr Gaston or the on-call doctor at [phone]    - Plan to see Dr Gaston tomorrow .      [facility]    [address] [facility] [address]    ** Most patients can drive the next day, but if you do not feel comfortable driving, please arrange for transportation.

## 2024-07-18 NOTE — OP NOTE
SURGEON:  Naman Gaston M.D.    PREOPERATIVE DIAGNOSIS:    Nuclear Sclerotic Cataract Right Eye    POSTOPERATIVE DIAGNOSIS:    Nuclear Sclerotic Cataract Right Eye    PROCEDURES:    Phacoemulsification with  intraocular lens, Right eye (04241)  With moderate sedation >10min (71177)    DATE OF SURGERY: 07/18/2024    IMPLANT: [email or web address] 20.0    ANESTHESIA:  Under my direct supervision, intravenous moderate sedation was administered during the course of this procedure, with continuous monitoring of hemodynamic parameters. Total time of sedation and amount of sedatives are documented in the nursing logs.    COMPLICATIONS:  None    ESTIMATED BLOOD LOSS: None    SPECIMENS: None    INDICATIONS:    The patient has a history of painless progressive visual loss and difficulty with activities of daily living, which specifically include difficult driving at night due to glare and difficulty reading small print, secondary to cataract formation.  After a thorough discussion of the risks, benefits, and alternatives to cataract surgery, including, but not limited to, the rare risks of infection, retinal detachment, hemorrhage, need for additional surgery, loss of vision, and even loss of the eye, the patient voices understanding and desires to proceed.    DESCRIPTION OF PROCEDURE:    The patients IOL calculations were reviewed, and the lens selection confirmed.   After verification and marking of the proper eye in the preop holding area, the patient was brought to the operating room in supine position where the eye was prepped and draped in standard sterile fashion with 5% Betadine and a lid speculum placed in the eye.   Topical 4% Lidocaine was used in addition to the preoperative anesthesia and the procedure was begun by the creation of a paracentesis incision through which viscoelastic was used to fill the anterior chamber.  Next, a keratome blade was used to create a triplanar temporal clear corneal incision and a cystotome and  Lindaata forceps used to fashion a continuous curvilinear capsulorrhexis.  Hydrodissection was carried out using the Gay hydrodissection cannula and the nucleus was found to be mobile.  Phacoemulsification of the nucleus was carried out using a quick chop technique, and all remaining epinuclear and cortical material was removed.  The eye was then reformed with Viscoelastic and the  intraocular lens was implanted into the capsular bag.  All remaining viscoelastics were removed from the eye and at the end of the case the pupil was round, the lens was well-centered within the capsular bag and all wounds were found to be water tight.  Drops of Vigamox and Pred Forte were instilled and a shield was placed over the eye. The patient will follow up with Dr. Gaston in the morning.

## 2024-07-19 ENCOUNTER — OFFICE VISIT (OUTPATIENT)
Dept: OPHTHALMOLOGY | Facility: CLINIC | Age: 59
End: 2024-07-19
Payer: COMMERCIAL

## 2024-07-19 DIAGNOSIS — H25.11 NUCLEAR SCLEROTIC CATARACT OF RIGHT EYE: ICD-10-CM

## 2024-07-19 DIAGNOSIS — Z98.890 POST-OPERATIVE STATE: Primary | ICD-10-CM

## 2024-07-19 PROCEDURE — 99999 PR PBB SHADOW E&M-EST. PATIENT-LVL III: CPT | Mod: PBBFAC,,, | Performed by: OPHTHALMOLOGY

## 2024-07-19 PROCEDURE — 99024 POSTOP FOLLOW-UP VISIT: CPT | Mod: S$GLB,,, | Performed by: OPHTHALMOLOGY

## 2024-07-19 PROCEDURE — 1159F MED LIST DOCD IN RCRD: CPT | Mod: CPTII,S$GLB,, | Performed by: OPHTHALMOLOGY

## 2024-07-19 PROCEDURE — 1160F RVW MEDS BY RX/DR IN RCRD: CPT | Mod: CPTII,S$GLB,, | Performed by: OPHTHALMOLOGY

## 2024-07-19 NOTE — PROGRESS NOTES
HPI    DATE OF SURGERY: 07/18/2024  IMPLANT: [email or web address] 20.    1 day Post Op OD   Pt state no complaints at this time     PGB TID OD     Last edited by Sanjiv Chris on 7/19/2024  7:52 AM.            Assessment /Plan     For exam results, see Encounter Report.    Post-operative state    Nuclear sclerotic cataract of right eye      Slit lamp exam:  L/L: nl  K: clear, wound sealed  AC: 1+ cell  Lens: IOL centered and stable    POD1 s/p Phaco/IOL  Appropriate precautions and post op medications reviewed.  Patient instructed to call or come in if symptoms of redness, decreased vision, or pain are experienced.

## 2024-08-20 ENCOUNTER — OFFICE VISIT (OUTPATIENT)
Dept: OPTOMETRY | Facility: CLINIC | Age: 59
End: 2024-08-20
Payer: COMMERCIAL

## 2024-08-20 DIAGNOSIS — Z96.1 S/P CATARACT EXTRACTION AND INSERTION OF INTRAOCULAR LENS, RIGHT: Primary | ICD-10-CM

## 2024-08-20 DIAGNOSIS — Z96.1 S/P CATARACT EXTRACTION AND INSERTION OF INTRAOCULAR LENS, LEFT: ICD-10-CM

## 2024-08-20 DIAGNOSIS — Z98.42 S/P CATARACT EXTRACTION AND INSERTION OF INTRAOCULAR LENS, LEFT: ICD-10-CM

## 2024-08-20 DIAGNOSIS — H26.492 POSTERIOR CAPSULAR OPACIFICATION NON VISUALLY SIGNIFICANT OF LEFT EYE: ICD-10-CM

## 2024-08-20 DIAGNOSIS — Z98.41 S/P CATARACT EXTRACTION AND INSERTION OF INTRAOCULAR LENS, RIGHT: Primary | ICD-10-CM

## 2024-08-20 PROBLEM — H25.11 NUCLEAR SCLEROTIC CATARACT OF RIGHT EYE: Status: RESOLVED | Noted: 2024-06-06 | Resolved: 2024-08-20

## 2024-08-20 PROCEDURE — 1159F MED LIST DOCD IN RCRD: CPT | Mod: CPTII,S$GLB,, | Performed by: OPTOMETRIST

## 2024-08-20 PROCEDURE — 99999 PR PBB SHADOW E&M-EST. PATIENT-LVL III: CPT | Mod: PBBFAC,,, | Performed by: OPTOMETRIST

## 2024-08-20 PROCEDURE — 99024 POSTOP FOLLOW-UP VISIT: CPT | Mod: S$GLB,,, | Performed by: OPTOMETRIST

## 2024-08-20 RX ORDER — IVERMECTIN 10 MG/G
CREAM TOPICAL
COMMUNITY
Start: 2024-02-29

## 2024-08-20 RX ORDER — DOXYCYCLINE 50 MG/1
CAPSULE ORAL
COMMUNITY
Start: 2024-05-29

## 2024-08-20 NOTE — PROGRESS NOTES
HPI    Pt here for 1 month PO OU s/p cat sx.     Pt sts VA OU stable. Pt just finished PO gtt yesterday. Pt denies pain.   Last edited by Ngozi Benitez on 8/20/2024  8:35 AM.            Assessment /Plan     For exam results, see Encounter Report.    S/P cataract extraction and insertion of intraocular lens, right    S/P cataract extraction and insertion of intraocular lens, left    Posterior capsular opacification non visually significant of left eye        OS CE w/ cpiol 06/06/2024 IMPLANT: [email or web address] 19.0   OD CE w/ pciol 07/18/2024 IMPLANT: [email or web address] 20.    OD POD # 33  Finished post op gtts   Very happy w/ vision outcome OU     IOP / DFE normal   Minimal PCO OS>OD discussed s/s     Continue with otc only for near, prn     Discussed and educated patient on current findings /plan.  RTC 1 year, prn if any changes / issues

## 2024-08-20 NOTE — PATIENT INSTRUCTIONS
"DRY EYES -- BURNING OR DARY SYMPTOMS:  Use Over The Counter artificial tears as needed for dry eye symptoms.   Some common brands include:  Systane, Optive, Refresh, and Thera-Tears.  These drops can be used as frequently as desired, but may be most helpful use during long periods of concentrated work.  For example, reading / working at the computer. Start with 3-4x per day.     Nighttime Ophthalmic gel or ointments are available: Refresh PM, Genteal, and Lacrilube.    Avoid drops that "get redness out" (Visine, Murine, Clear Eyes), as these may contain medication that could further irritate the eyes, especially with chronic use.    ALLERGY EYES -- ITCHING SYMPTOMS:  Over the counter medications include--Pataday, Zaditor, and Alaway.  Use as directed 1-2 drops daily for symptoms of itching / watering eyes.  These drops will not help for dry eye or exposure symptoms.    REDNESS RELIEF:  Lumify---is a good redness reliever that will not cause irritation if used chronically.        FLASHES / FLOATERS / POSTERIOR VITREOUS DETACHMENT    Call the clinic if you have any further changes in symptoms.  Including:  Increased numbers of floaters or flashing lights, dimness or darkness that moves through or stays constant in your vision, or any pain in the eye (s).    You may sometimes see small specks or clouds moving in your field of vision.  They are called FLOATERS.  You can often see them when looking at a plain background, like a blank wall or blue maricel.  Floaters are actually tiny clumps of gel or cells inside the VITREOUS, the clear jelly-like fluid that fills the inside of your eye.    While these objects look like they are in front of your eye, they are actually floating inside.  What you see are the shadows they cast on the RETINA, the nerve layer at the back of the eye that senses light and allows you to see.      POSTERIOR VITREOUS DETACHMENT    The appearance of new floaters may be alarming.  If you suddenly " develop new floaters, you should contact your eye care professional  right away.    The retina can tear if the shrinking vitreous pulls away from the wall of the eye.  This sometimes causes a small amount of bleeding in the eye that may appear as new floaters.    A torn retina is always a serious problem, since it can lead to a retinal detachment.  You should see your eye care professional as soon as possible if:    even one new floater appears suddenly;  you see sudden flashes of light;  you notice other symptoms, like the loss of side vision, or a curtain closes down in your vision        POSTERIOR VITREOUS DETACHMENT is more common for people who:    are nearsighted;  have had cataract surgery;  have had YAG laser surgery of the eye;  have had inflammation inside the eye;  are over age 60.      While some floaters may remain visible, many of them will fade over time and become less noticeable.  Even if you've had some floaters for years, you should have your eyes checked as soon as possible if you notice new ones.    FLASHING LIGHTS    When the vitreous gel rubs or pulls on the retina, you may see what look like flashing lights or lightning streaks.  These flashes can appear off and on for several weeks or months.      Some people experience flashes of light that appear as jagged lines or heat waves in both eyes, lasting 10-20 minutes.  These flashes are caused by a spasm of blood vessels in the brain, which is called a migraine.    If a headache follows these flashes, it's called a migraine headache.  If   no headache occurs, these flashes are called Ophthalmic or Ocular Migraine.

## 2024-09-13 ENCOUNTER — HOSPITAL ENCOUNTER (OUTPATIENT)
Dept: RADIOLOGY | Facility: HOSPITAL | Age: 59
Discharge: HOME | End: 2024-09-13
Attending: STUDENT IN AN ORGANIZED HEALTH CARE EDUCATION/TRAINING PROGRAM
Payer: COMMERCIAL

## 2024-09-13 DIAGNOSIS — Z78.0 MENOPAUSE: ICD-10-CM

## 2024-09-13 DIAGNOSIS — Z78.0 MENOPAUSE: Primary | ICD-10-CM

## 2024-09-13 PROCEDURE — 77080 DXA BONE DENSITY AXIAL: CPT | Mod: TC,PO

## 2024-09-13 PROCEDURE — 77080 DXA BONE DENSITY AXIAL: CPT | Mod: 26,,, | Performed by: RADIOLOGY

## 2024-12-16 DIAGNOSIS — M25.561 PAIN IN BOTH KNEES, UNSPECIFIED CHRONICITY: Primary | ICD-10-CM

## 2024-12-16 DIAGNOSIS — M25.562 PAIN IN BOTH KNEES, UNSPECIFIED CHRONICITY: Primary | ICD-10-CM

## 2024-12-20 ENCOUNTER — HOSPITAL ENCOUNTER (OUTPATIENT)
Dept: RADIOLOGY | Facility: HOSPITAL | Age: 59
Discharge: HOME | End: 2024-12-20
Attending: ORTHOPAEDIC SURGERY
Payer: COMMERCIAL

## 2024-12-20 ENCOUNTER — OFFICE VISIT (OUTPATIENT)
Dept: ORTHOPEDICS | Facility: CLINIC | Age: 59
End: 2024-12-20
Payer: COMMERCIAL

## 2024-12-20 VITALS — WEIGHT: 265 LBS | HEIGHT: 67 IN | BODY MASS INDEX: 41.59 KG/M2

## 2024-12-20 DIAGNOSIS — M25.562 PAIN IN BOTH KNEES, UNSPECIFIED CHRONICITY: ICD-10-CM

## 2024-12-20 DIAGNOSIS — M25.561 PAIN IN BOTH KNEES, UNSPECIFIED CHRONICITY: Primary | ICD-10-CM

## 2024-12-20 DIAGNOSIS — M25.561 PAIN IN BOTH KNEES, UNSPECIFIED CHRONICITY: ICD-10-CM

## 2024-12-20 DIAGNOSIS — M25.562 PAIN IN BOTH KNEES, UNSPECIFIED CHRONICITY: Primary | ICD-10-CM

## 2024-12-20 PROCEDURE — 73564 X-RAY EXAM KNEE 4 OR MORE: CPT | Mod: 26,,, | Performed by: RADIOLOGY

## 2024-12-20 PROCEDURE — 73564 X-RAY EXAM KNEE 4 OR MORE: CPT | Mod: TC,50

## 2024-12-20 PROCEDURE — 99999 PR PBB SHADOW E&M-EST. PATIENT-LVL III: CPT | Mod: PBBFAC,,, | Performed by: ORTHOPAEDIC SURGERY

## 2024-12-20 RX ORDER — TRIAMCINOLONE ACETONIDE 40 MG/ML
40 INJECTION, SUSPENSION INTRA-ARTICULAR; INTRAMUSCULAR
Status: DISCONTINUED | OUTPATIENT
Start: 2024-12-20 | End: 2024-12-20 | Stop reason: HOSPADM

## 2024-12-20 RX ADMIN — TRIAMCINOLONE ACETONIDE 40 MG: 40 INJECTION, SUSPENSION INTRA-ARTICULAR; INTRAMUSCULAR at 11:12

## 2024-12-20 NOTE — PROGRESS NOTES
Patient ID: Leandra Lopes is a 59 y.o. female    Chief Complaint:   Chief Complaint   Patient presents with    Right Knee - Pain    Left Knee - Pain       History of Present Illness:    Leandra Lopes is a pleasant 59 y.o. female who presents for evaluation of bilateral knee pain. She  reports pain for the past several years but is worsening. She does not endorse a history of trauma.  Right knee is the worst..  Prolonged standing or sitting makes it worse. The pain is mostly  global . She does endorse nighttime pain.   She is independent with all activities of daily living.     In the past she has tried the following treatments:    Cortisone injections several years ago    She currently does work.       Instability: Yes  Mechanical Symptoms: Yes    Diabetic:  Yes  Smoker:  No        PAST MEDICAL HISTORY:   Past Medical History:   Diagnosis Date    Breast CA     Breast cancer 2013    left breast infiltrating ductal carcinoma, ER/ND positive, Her2 negative    Difficult intravenous access     patient is a hard iv stick    Lung nodules     Needle phobia     Obese     SADIE (obstructive sleep apnea)     on cpap     PAST SURGICAL HISTORY:   Past Surgical History:   Procedure Laterality Date    BREAST BIOPSY  2013    left breast- IDC    BREAST SURGERY  10/16/2013    bilateral mastectomy and reconstruction-no implants    CATARACT EXTRACTION W/  INTRAOCULAR LENS IMPLANT Left 2024    Procedure: EXTRACTION, CATARACT, WITH IOL INSERTION;  Surgeon: Naman Gaston MD;  Location: [facility] OR;  Service: Ophthalmology;  Laterality: Left;    CATARACT EXTRACTION W/  INTRAOCULAR LENS IMPLANT Right 2024    Procedure: EXTRACTION, CATARACT, WITH IOL INSERTION;  Surgeon: Naman Gaston MD;  Location: [facility] OR;  Service: Ophthalmology;  Laterality: Right;     SECTION      x3    CHOLECYSTECTOMY      COLON SURGERY  2009    benign neoplasm recheck 2014, 12 inches    EPIDURAL STEROID INJECTION INTO LUMBAR SPINE N/A 2019     Procedure: Injection-steroid-epidural-lumbar;  Surgeon: Niko Meek MD;  Location: [facility] OR;  Service: Pain Management;  Laterality: N/A;  L2-L3    EPIDURAL STEROID INJECTION INTO LUMBAR SPINE N/A 10/20/2020    Procedure: Injection-steroid-epidural-lumbar;  Surgeon: Niko Meek MD;  Location: [facility] OR;  Service: Pain Management;  Laterality: N/A;  L2-3   Pt wanted to let ASC know she is a hard stick     HERNIA REPAIR  2013    HYSTERECTOMY      TVH/BSO, benign    TONSILLECTOMY      TUBAL LIGATION       FAMILY HISTORY:   Family History   Problem Relation Name Age of Onset    Cancer Mother Telma         liver, bone, breast    Hypertension Mother Telma     Heart disease Mother Telma     Breast cancer Mother Telma 63        initial dx ILC at 63 with recurrence at 67, 2nd CA IDC at 67    Miscarriages / Stillbirths Mother Telma     Cancer Father Rohan 62        bone ? breast    Diabetes Father Rohan     Heart disease Father Rohan     Breast cancer Maternal Aunt  50    Ovarian cancer Cousin M 1st cousin 49        daughter of M Aunt with Breast CA    Cancer Brother Al     Glaucoma Neg Hx       SOCIAL HISTORY:   Social History     Occupational History    Not on file   Tobacco Use    Smoking status: Never    Smokeless tobacco: Never   Substance and Sexual Activity    Alcohol use: Not Currently     Comment: rarely    Drug use: No    Sexual activity: Not Currently     Partners: Male     Birth control/protection: Post-menopausal, See Surgical Hx, None        MEDICATIONS:   Current Outpatient Medications:     CREON 24,000-76,000 -120,000 unit capsule, Take 1 capsule by mouth once daily., Disp: , Rfl:     cyanocobalamin-cobamamide (B12) 5,000-100 mcg Lozg, , Disp: , Rfl:     doxycycline (MONODOX) 50 MG Cap, TAKE 1 CAPSULE BY MOUTH EVERY DAY AS NEEDED FOR ROSACEA FLARE-UPS, Disp: , Rfl:     glucosam-chond-hyalu-CF borate 750 mg-100 mg- 1.65 mg-108 mg Tab, Take 2 tablets by mouth once daily., Disp: , Rfl:     metronidazole 1%  (METROGEL) 1 % Gel, Apply 1 application  topically., Disp: , Rfl:     multivitamin capsule, Take 1 capsule by mouth once daily., Disp: , Rfl:     SOOLANTRA 1 % Crea, APPLY 1 APPLICATION TO FACE ONCE A NIGHT, Disp: , Rfl:     zinc sulfate (ZINC-15 ORAL), Take by mouth., Disp: , Rfl:     albuterol (PROVENTIL HFA) 90 mcg/actuation inhaler, Inhale 2 puffs into the lungs every 6 (six) hours as needed for Wheezing. Rescue, Disp: 6.7 g, Rfl: 0    cetirizine (ZYRTEC) 10 MG tablet, Take 1 tablet (10 mg total) by mouth once daily., Disp: 30 tablet, Rfl: 0    ondansetron (ZOFRAN-ODT) 4 MG TbDL, Take 1 tablet (4 mg total) by mouth every 6 (six) hours as needed (nausea)., Disp: 20 tablet, Rfl: 0    prednisolon/gatiflox/bromfenac (PREDNISOL ACE-GATIFLOX-BROMFEN) 1-0.5-0.075 % DrpS, Apply 1 drop to eye 3 (three) times daily. in operative eye for 1 month after surgery, Disp: 5 mL, Rfl: 3    Current Facility-Administered Medications:     midazolam injection 1 mg, 1 mg, Intravenous, On Call Procedure, Naman Gaston MD    sodium chloride 0.9% flush 10 mL, 10 mL, Intravenous, PRN, Naman Gaston MD    sodium chloride 0.9% flush 10 mL, 10 mL, Intravenous, PRN, Naman Gaston MD  ALLERGIES:   Review of patient's allergies indicates:   Allergen Reactions    Morphine Shortness Of Breath    Adhesive Rash     AGENT: Patient thinks it was PAPER TAPE    Aspirin Other (See Comments)     Stomach pain    Penicillin g Other (See Comments)     Stomach pain    Morphine sulfate          Physical Exam     There were no vitals filed for this visit.  Alert and oriented to person, place and time. No acute distress. Well-groomed, not ill appearing. Pupils round and reactive, normal respiratory effort, no audible wheezing.     GENERAL:  A well-developed, well-nourished 59 y.o. female who is alert and       oriented in no acute distress.      Gait: She  walks with a antalgic gait.                   EXTREMITIES:  Examination of lower extremities reveals  there is no visible mass or deformity.    Left knee:  ROM 5-125    Ligamentously stable to varus/valgus stress.    Anterior and posterior drawers negative.    No pain over pes bursa.    No warmth    No erythema     Effusion Yes    medial joint line tenderness    Positive Patellofemoral grind/crepitus     Right knee:  ROM 5-125    Ligamentously stable to varus/valgus stress.    Anterior and posterior drawers negative.    No pain over pes bursa.    No warmth    No erythema    Effusion Yes    medial joint line tenderness    Positive Patellofemoral grind/crepitus     The skin over both lower extremities is normal and unremarkable.  She has a  painless range of motion of the hips and ankles bilaterally.   Sensation is intact in both lower extremities.    There are no motor deficits in the lower extremities bilaterally.   Pedal pulses are palpable distally bilaterally.    She has no calf tenderness to palpation nor edema.       Imaging:       X-Ray: I have reviewed all pertinent results/findings and my personal findings are:  Severe bilateral knee DJD mostly involving the medial compartment with obliteration of the medial joint space, subchondral sclerosis and osteophytes      Assessment & Plan    Pain in both knees, unspecified chronicity         I made the decision to obtain old records of the patient including previous notes and imaging. New imaging, if ordered today of the extremity or extremities, were evaluated. I independently reviewed and interpreted the radiographs and/or MRIs/CT scan today as well as prior imaging. I also reviewed the referring provider's documentation as well as any pertinent labs, tests and imaging.     Leandra Lopes is a 59 y.o. female with bilateral knee DJD    Treatment options were discussed in detail with the patient. We discussed multiple options including non-operative and operative treatment options.   Non-operatively we discussed bracing, physical therapy and injections. Operatively  we discussed total knee replacement and the expectations of surgery long term as well as the rehabilitation associated with surgery as well as risks and benefits of surgery.     After shared medical decision-making with the patient, patient would like to proceed with a trial of:     Trial of Anti-Inflammatories  Corticosteroid Injection bilateral knees     We can consider repeat injections versus surgery if pain is refractory to conservative treatment    All questions were answered and patient is agreeable to the above plan.

## 2024-12-20 NOTE — PROCEDURES
Large Joint Aspiration/Injection: bilateral knee    Date/Time: 12/20/2024 11:30 AM    Performed by: Kaden Cross MD  Authorized by: Kaden Cross MD    Consent Done?:  Yes (Verbal)  Indications:  Pain  Site marked: the procedure site was marked    Timeout: prior to procedure the correct patient, procedure, and site was verified    Prep: patient was prepped and draped in usual sterile fashion      Local anesthesia used?: Yes    Anesthesia:  Local infiltration  Local anesthetic:  Lidocaine 1% without epinephrine and lidocaine spray (Ropivicaine)  Anesthetic total (ml):  6      Details:  Needle Size:  22 G  Ultrasonic Guidance for needle placement?: No    Approach:  Anterolateral  Location:  Knee  Laterality:  Bilateral  Site:  Bilateral knee  Medications (Right):  40 mg triamcinolone acetonide 40 mg/mL  Medications (Left):  40 mg triamcinolone acetonide 40 mg/mL  Patient tolerance:  Patient tolerated the procedure well with no immediate complications

## 2025-01-02 ENCOUNTER — LAB VISIT (OUTPATIENT)
Dept: LAB | Facility: HOSPITAL | Age: 60
End: 2025-01-02
Attending: STUDENT IN AN ORGANIZED HEALTH CARE EDUCATION/TRAINING PROGRAM
Payer: COMMERCIAL

## 2025-01-02 ENCOUNTER — OFFICE VISIT (OUTPATIENT)
Dept: FAMILY MEDICINE | Facility: CLINIC | Age: 60
End: 2025-01-02
Payer: COMMERCIAL

## 2025-01-02 VITALS
SYSTOLIC BLOOD PRESSURE: 116 MMHG | HEIGHT: 67 IN | BODY MASS INDEX: 42.63 KG/M2 | DIASTOLIC BLOOD PRESSURE: 82 MMHG | WEIGHT: 271.63 LBS | OXYGEN SATURATION: 98 % | HEART RATE: 69 BPM

## 2025-01-02 DIAGNOSIS — E66.01 CLASS 3 SEVERE OBESITY WITHOUT SERIOUS COMORBIDITY IN ADULT, UNSPECIFIED BMI, UNSPECIFIED OBESITY TYPE: ICD-10-CM

## 2025-01-02 DIAGNOSIS — E66.813 CLASS 3 SEVERE OBESITY WITHOUT SERIOUS COMORBIDITY IN ADULT, UNSPECIFIED BMI, UNSPECIFIED OBESITY TYPE: ICD-10-CM

## 2025-01-02 DIAGNOSIS — G89.29 CHRONIC BACK PAIN, UNSPECIFIED BACK LOCATION, UNSPECIFIED BACK PAIN LATERALITY: ICD-10-CM

## 2025-01-02 DIAGNOSIS — Z00.00 ROUTINE GENERAL MEDICAL EXAMINATION AT A HEALTH CARE FACILITY: Primary | ICD-10-CM

## 2025-01-02 DIAGNOSIS — M54.9 CHRONIC BACK PAIN, UNSPECIFIED BACK LOCATION, UNSPECIFIED BACK PAIN LATERALITY: ICD-10-CM

## 2025-01-02 DIAGNOSIS — Z00.00 ROUTINE GENERAL MEDICAL EXAMINATION AT A HEALTH CARE FACILITY: ICD-10-CM

## 2025-01-02 LAB
ALBUMIN SERPL BCP-MCNC: 3.6 G/DL (ref 3.5–5.2)
ALP SERPL-CCNC: 123 U/L (ref 40–150)
ALT SERPL W/O P-5'-P-CCNC: 20 U/L (ref 10–44)
ANION GAP SERPL CALC-SCNC: 9 MMOL/L (ref 8–16)
AST SERPL-CCNC: 16 U/L (ref 10–40)
BASOPHILS # BLD AUTO: 0.03 K/UL (ref 0–0.2)
BASOPHILS NFR BLD: 0.4 % (ref 0–1.9)
BILIRUB SERPL-MCNC: 0.6 MG/DL (ref 0.1–1)
BUN SERPL-MCNC: 15 MG/DL (ref 6–20)
CALCIUM SERPL-MCNC: 9.5 MG/DL (ref 8.7–10.5)
CHLORIDE SERPL-SCNC: 106 MMOL/L (ref 95–110)
CHOLEST SERPL-MCNC: 200 MG/DL (ref 120–199)
CHOLEST/HDLC SERPL: 4.8 {RATIO} (ref 2–5)
CO2 SERPL-SCNC: 27 MMOL/L (ref 23–29)
CREAT SERPL-MCNC: 0.7 MG/DL (ref 0.5–1.4)
DIFFERENTIAL METHOD BLD: ABNORMAL
EOSINOPHIL # BLD AUTO: 0.1 K/UL (ref 0–0.5)
EOSINOPHIL NFR BLD: 1.2 % (ref 0–8)
ERYTHROCYTE [DISTWIDTH] IN BLOOD BY AUTOMATED COUNT: 13.9 % (ref 11.5–14.5)
EST. GFR  (NO RACE VARIABLE): >60 ML/MIN/1.73 M^2
ESTIMATED AVG GLUCOSE: 103 MG/DL (ref 68–131)
GLUCOSE SERPL-MCNC: 86 MG/DL (ref 70–110)
HBA1C MFR BLD: 5.2 % (ref 4–5.6)
HCT VFR BLD AUTO: 42.1 % (ref 37–48.5)
HDLC SERPL-MCNC: 42 MG/DL (ref 40–75)
HDLC SERPL: 21 % (ref 20–50)
HGB BLD-MCNC: 13.1 G/DL (ref 12–16)
IMM GRANULOCYTES # BLD AUTO: 0.02 K/UL (ref 0–0.04)
IMM GRANULOCYTES NFR BLD AUTO: 0.3 % (ref 0–0.5)
LDLC SERPL CALC-MCNC: 136.6 MG/DL (ref 63–159)
LYMPHOCYTES # BLD AUTO: 2.4 K/UL (ref 1–4.8)
LYMPHOCYTES NFR BLD: 30.4 % (ref 18–48)
MCH RBC QN AUTO: 29.8 PG (ref 27–31)
MCHC RBC AUTO-ENTMCNC: 31.1 G/DL (ref 32–36)
MCV RBC AUTO: 96 FL (ref 82–98)
MONOCYTES # BLD AUTO: 0.5 K/UL (ref 0.3–1)
MONOCYTES NFR BLD: 5.8 % (ref 4–15)
NEUTROPHILS # BLD AUTO: 4.8 K/UL (ref 1.8–7.7)
NEUTROPHILS NFR BLD: 61.9 % (ref 38–73)
NONHDLC SERPL-MCNC: 158 MG/DL
NRBC BLD-RTO: 0 /100 WBC
PLATELET # BLD AUTO: 215 K/UL (ref 150–450)
PMV BLD AUTO: 11.7 FL (ref 9.2–12.9)
POTASSIUM SERPL-SCNC: 4 MMOL/L (ref 3.5–5.1)
PROT SERPL-MCNC: 7.3 G/DL (ref 6–8.4)
RBC # BLD AUTO: 4.39 M/UL (ref 4–5.4)
SODIUM SERPL-SCNC: 142 MMOL/L (ref 136–145)
TRIGL SERPL-MCNC: 107 MG/DL (ref 30–150)
TSH SERPL DL<=0.005 MIU/L-ACNC: 1.3 UIU/ML (ref 0.4–4)
WBC # BLD AUTO: 7.73 K/UL (ref 3.9–12.7)

## 2025-01-02 PROCEDURE — 3074F SYST BP LT 130 MM HG: CPT | Mod: CPTII,S$GLB,, | Performed by: STUDENT IN AN ORGANIZED HEALTH CARE EDUCATION/TRAINING PROGRAM

## 2025-01-02 PROCEDURE — 85025 COMPLETE CBC W/AUTO DIFF WBC: CPT | Performed by: STUDENT IN AN ORGANIZED HEALTH CARE EDUCATION/TRAINING PROGRAM

## 2025-01-02 PROCEDURE — 3079F DIAST BP 80-89 MM HG: CPT | Mod: CPTII,S$GLB,, | Performed by: STUDENT IN AN ORGANIZED HEALTH CARE EDUCATION/TRAINING PROGRAM

## 2025-01-02 PROCEDURE — 36415 COLL VENOUS BLD VENIPUNCTURE: CPT | Mod: PO | Performed by: STUDENT IN AN ORGANIZED HEALTH CARE EDUCATION/TRAINING PROGRAM

## 2025-01-02 PROCEDURE — 3008F BODY MASS INDEX DOCD: CPT | Mod: CPTII,S$GLB,, | Performed by: STUDENT IN AN ORGANIZED HEALTH CARE EDUCATION/TRAINING PROGRAM

## 2025-01-02 PROCEDURE — 99396 PREV VISIT EST AGE 40-64: CPT | Mod: S$GLB,,, | Performed by: STUDENT IN AN ORGANIZED HEALTH CARE EDUCATION/TRAINING PROGRAM

## 2025-01-02 PROCEDURE — 80061 LIPID PANEL: CPT | Performed by: STUDENT IN AN ORGANIZED HEALTH CARE EDUCATION/TRAINING PROGRAM

## 2025-01-02 PROCEDURE — 83036 HEMOGLOBIN GLYCOSYLATED A1C: CPT | Performed by: STUDENT IN AN ORGANIZED HEALTH CARE EDUCATION/TRAINING PROGRAM

## 2025-01-02 PROCEDURE — 80053 COMPREHEN METABOLIC PANEL: CPT | Performed by: STUDENT IN AN ORGANIZED HEALTH CARE EDUCATION/TRAINING PROGRAM

## 2025-01-02 PROCEDURE — 99999 PR PBB SHADOW E&M-EST. PATIENT-LVL III: CPT | Mod: PBBFAC,,, | Performed by: STUDENT IN AN ORGANIZED HEALTH CARE EDUCATION/TRAINING PROGRAM

## 2025-01-02 PROCEDURE — 1160F RVW MEDS BY RX/DR IN RCRD: CPT | Mod: CPTII,S$GLB,, | Performed by: STUDENT IN AN ORGANIZED HEALTH CARE EDUCATION/TRAINING PROGRAM

## 2025-01-02 PROCEDURE — 1159F MED LIST DOCD IN RCRD: CPT | Mod: CPTII,S$GLB,, | Performed by: STUDENT IN AN ORGANIZED HEALTH CARE EDUCATION/TRAINING PROGRAM

## 2025-01-02 PROCEDURE — 84443 ASSAY THYROID STIM HORMONE: CPT | Performed by: STUDENT IN AN ORGANIZED HEALTH CARE EDUCATION/TRAINING PROGRAM

## 2025-01-02 RX ORDER — LIDOCAINE 50 MG/G
1 PATCH TOPICAL DAILY
Qty: 30 PATCH | Refills: 11 | Status: SHIPPED | OUTPATIENT
Start: 2025-01-02

## 2025-01-02 NOTE — PROGRESS NOTES
Patient ID: Leandra Lopes is a 59 y.o. female.    Chief Complaint: Annual Exam    History of Present Illness    CHIEF COMPLAINT:  Patient presents today for annual    MUSCULOSKELETAL PAIN:  She received knee injections with excellent response in the left knee but minimal duration of relief in the right knee, which is in worse condition. She reports unsteadiness and occasional falls, particularly on uneven surfaces, and notes dragging her feet. She denies interest in knee replacement surgery at this time. She experiences back pain due to arthritis and spinal curvature. She undergoes annual nerve ablation procedures for pain management, though has not had the procedure in 2024.    PAIN MANAGEMENT:  She currently uses OTC topical medications for knee pain and lidocaine patches (3%) for back pain, reporting effectiveness with the patches. She previously took meloxicam but discontinued due to pancreatic effects, switching to Aleve.    CURRENT MEDICATIONS:  She takes Aleve for pain management and Creon for pancreatic support.    FAMILY HISTORY:  Brother has thyroid disease and thyroid cancer requiring thyroidectomy.          Physical Exam    General: No acute distress. Well-developed. Well-nourished.  Eyes: EOMI. Sclerae anicteric.  HENT: Normocephalic. Atraumatic. Nares patent. Moist oral mucosa.  Cardiovascular: Regular rate. Regular rhythm. No murmurs. No rubs. No gallops. Normal S1, S2.  Respiratory: Normal respiratory effort. Clear to auscultation bilaterally. No rales. No rhonchi. No wheezing.  Abdomen: Soft. Non-tender. Non-distended. Normoactive bowel sounds.  Musculoskeletal: No  obvious deformity.  Extremities: No lower extremity edema.  Neurological: Alert & oriented x3. No slurred speech. Normal gait. [facility]  Psychiatric: Normal mood. Normal affect. Good insight. Good judgment.  Skin: Warm. Dry. No rash.         Assessment & Plan    IMPRESSION:  - Assessed gait and neurological function through physical  exam. No gross abnormalities.   - Evaluated knee and back issues as likely causes of unsteady gait and falling episodes  - Considered knee replacement but agreed with patient's preference to delay surgery  - Reviewed thyroid health due to family history of thyroid cancer    (SEVERE) OBESITY DUE TO EXCESS CALORIES:  - Evaluated patient's balance and gait through neurological tests, including heel-to-toe walking and balance with eyes closed.  - Assessment suggests unsteadiness is likely due to knee problems rather than neurological issues.  - Ordered comprehensive labs including diabetes, liver and kidney function, electrolytes, cholesterol, and thyroid function.  - Instructed patient to report any increase in fall frequency or worsening of unsteadiness.    PROBLEMS:  - Explained to the patient that knee issues are mechanical problems without a pharmacological solution.  - Discussed the rationale for delaying knee replacement surgery, while acknowledging the possibility of future intervention.  - Noted that the patient received intra-articular injections in both knees, with the left knee responding better than the right.  - The right knee is reported to be worse, with pain relief from recent injections not lasting long.  - Acknowledged the patient's use of OTC topical analgesics for knee pain.  - Considered alternative pain management strategies for the right knee.    PAIN:  - Explained that back issues, like knee problems, are mechanical without a pharmacological solution.  - Noted the patient's report of previous MRIs, other diagnostic procedures, arthritis, and bone-on-bone condition in the back.  - Discussed treatment options, including the patient's previous use of radiofrequency ablation procedures.  - Prescribed 5% lidocaine patches for back pain management, emphasizing their minimal side effects, and sent the prescription to [facility].  - Noted the patient's report of severe curvature of the spine.    AND FALL  RISK:  - Noted the patient's report of unsteadiness, which was even detected by their smartphone.  - Discussed potential causes of falls, including possible foot drag issues.  - Advised the patient to use assistive devices as needed to prevent falls.    CATARACT:  - Noted the patient's recent bilateral cataract surgery.    MEDICATIONS/SUPPLEMENTS:  - Documented the patient's previous use of meloxicam, which was discontinued due to pancreatic issues, and current use of naproxen as a replacement.         Leandra was seen today for annual exam.    Diagnoses and all orders for this visit:    Routine general medical examination at a health care facility  -     Lipid Panel; Future  -     Hemoglobin A1C; Future  -     Comprehensive Metabolic Panel; Future  -     CBC Auto Differential; Future  -     TSH; Future    Chronic back pain, unspecified back location, unspecified back pain laterality  -     LIDOcaine (LIDODERM) 5 %; Place 1 patch onto the skin once daily. Remove & Discard patch within 12 hours or as directed by MD    Class 3 severe obesity without serious comorbidity in adult, unspecified BMI, unspecified obesity type  Largely stable in the last year.         Follow up in about 1 year (around 1/2/2026).    This note was generated with the assistance of ambient listening technology. Verbal consent was obtained by the patient and accompanying visitor(s) for the recording of patient appointment to facilitate this note. I attest to having reviewed and edited the generated note for accuracy, though some syntax or spelling errors may persist. Please contact the author of this note for any clarification.

## 2025-02-21 ENCOUNTER — OFFICE VISIT (OUTPATIENT)
Dept: PULMONOLOGY | Facility: CLINIC | Age: 60
End: 2025-02-21
Payer: COMMERCIAL

## 2025-02-21 VITALS
OXYGEN SATURATION: 96 % | HEART RATE: 66 BPM | SYSTOLIC BLOOD PRESSURE: 132 MMHG | BODY MASS INDEX: 43.23 KG/M2 | HEIGHT: 67 IN | DIASTOLIC BLOOD PRESSURE: 80 MMHG | WEIGHT: 275.44 LBS

## 2025-02-21 DIAGNOSIS — E66.813 CLASS 3 SEVERE OBESITY DUE TO EXCESS CALORIES WITH SERIOUS COMORBIDITY AND BODY MASS INDEX (BMI) OF 40.0 TO 44.9 IN ADULT: ICD-10-CM

## 2025-02-21 DIAGNOSIS — E66.01 CLASS 3 SEVERE OBESITY DUE TO EXCESS CALORIES WITH SERIOUS COMORBIDITY AND BODY MASS INDEX (BMI) OF 40.0 TO 44.9 IN ADULT: ICD-10-CM

## 2025-02-21 DIAGNOSIS — Z85.3 HISTORY OF BREAST CANCER: ICD-10-CM

## 2025-02-21 DIAGNOSIS — G47.33 OSA (OBSTRUCTIVE SLEEP APNEA): Primary | ICD-10-CM

## 2025-02-21 NOTE — PATIENT INSTRUCTIONS
Overall BIPAP compliance report shows great compliance!    Continue BIPAP nightly.    BIPAP supplies sent to [facility] - this order should be good for one year.    Can pre-cautiously check CT Chest to further monitor small lung nodules - you wish to hold off at this time.     Continue current prescription medication regiment. Keep follow up appointment as scheduled. Please call the office if you have any questions or concerns.

## 2025-02-21 NOTE — PROGRESS NOTES
2/21/2025    Leandra Lopes  New Patient Consult    Chief Complaint   Patient presents with    Sleep Apnea       HPI:  02/21/2025: Hx: SADIE on BIPAP, Breast CA s/p mastectomy  In office today alone.  Denies history of lung disease. Denies current use of inhaler therapy, supplemental oxygen. Denies personal history of PE, current anticoagulation use.  Former patient of Dr. Boyd - initially diagnosed with SADIE in approx 2008 after undergoing large GI surgery - in which she was apneic over night, witnessed per  and nurses. States currently using BIPAP - received latest machine in approx 2023? Currently using a full face mask - states she has tried several others - however she feels as if this one is the best - also notes she feels as if she can't get a good seal with this current mask, POLLY states zero leaks despite. States she wishes to use [facility] as her new DME for CPAP supplies. Review of EPIC reveals initial AHI 92.6 during REM sleep.   Diagnosed with breast cancer in 2013 - underwent double mastectomy in 2013/2014 - no chemotherapy or radiation done.   Denies shortness of breath, wheezing, chest tightness, cough, mucous production.   BIPAP compliance report reviewed from dates 01/22/2025 - 02/20/2025:  Usage > = 4 hours 100%  BIPAP 25/16   Pressure Support 6  AHI 0.5          Social Hx: Lives with daughter - two cats in the home. Works Part time - Tutoring. Possible Asbestosis exposure, Smoking Hx: Never Smoker  Family Hx: No Lung Cancer, COPD, Asthma  Medical Hx: No previous pneumonia ; Previous chest surgery - bilateral mastectomy [id number]        The Chief Complaint is: New to me      PFSH:  Past Medical History:   Diagnosis Date    Breast CA     Breast cancer 8/2013    left breast infiltrating ductal carcinoma, ER/[place] positive, Her2 negative    Difficult intravenous access     patient is a hard iv stick    Lung nodules     Needle phobia     Obese     SADIE (obstructive  sleep apnea)     on cpap         Past Surgical History:   Procedure Laterality Date    BREAST BIOPSY  2013    left breast- IDC    BREAST SURGERY  10/16/2013    bilateral mastectomy and reconstruction-no implants    CATARACT EXTRACTION W/  INTRAOCULAR LENS IMPLANT Left 2024    Procedure: EXTRACTION, CATARACT, WITH IOL INSERTION;  Surgeon: Naman Gaston MD;  Location: [facility];  Service: Ophthalmology;  Laterality: Left;    CATARACT EXTRACTION W/  INTRAOCULAR LENS IMPLANT Right 2024    Procedure: EXTRACTION, CATARACT, WITH IOL INSERTION;  Surgeon: Naman Gaston MD;  Location: [facility] OR;  Service: Ophthalmology;  Laterality: Right;     SECTION      x3    CHOLECYSTECTOMY      COLON SURGERY  2009    benign neoplasm recheck 2014, 12 inches    EPIDURAL STEROID INJECTION INTO LUMBAR SPINE N/A 2019    Procedure: Injection-steroid-epidural-lumbar;  Surgeon: Niko Meek MD;  Location: [facility] OR;  Service: Pain Management;  Laterality: N/A;  L2-L3    EPIDURAL STEROID INJECTION INTO LUMBAR SPINE N/A 10/20/2020    Procedure: Injection-steroid-epidural-lumbar;  Surgeon: Niko Meek MD;  Location: [facility] OR;  Service: Pain Management;  Laterality: N/A;  L2-3   Pt wanted to let ASC know she is a hard stick     HERNIA REPAIR      HYSTERECTOMY      TVH/BSO, benign    TONSILLECTOMY      TUBAL LIGATION       Social History[1]  Family History   Problem Relation Name Age of Onset    Cancer Mother Telma         liver, bone, breast    Hypertension Mother Telma     Heart disease Mother Telma     Breast cancer Mother Telma 63        initial dx ILC at 63 with recurrence at 67, 2nd CA IDC at 67    Miscarriages / Stillbirths Mother Telma     Cancer Father Rohan 62        bone ? breast    Diabetes Father Rohan     Heart disease Father Rohan     Breast cancer Maternal Aunt  50    Ovarian cancer Cousin M 1st cousin 49        daughter of M Aunt with Breast CA    Cancer Brother Al      "Glaucoma Neg Hx       Review of patient's allergies indicates:   Allergen Reactions    Morphine Shortness Of Breath    Adhesive Rash     AGENT: Patient thinks it was PAPER TAPE    Aspirin Other (See Comments)     Stomach pain    Penicillin g Other (See Comments)     Stomach pain    Morphine sulfate          I have reviewed past medical, family, and social history.     Performance Status:The patient's activity level is no limits with regular activity.        Review of Systems   Constitutional:  Positive for fatigue. Negative for activity change, appetite change, chills and fever.   HENT:  Negative for congestion, postnasal drip, rhinorrhea, sinus pressure, sinus pain, sore throat and trouble swallowing.    Eyes:  Negative for photophobia and visual disturbance.   Respiratory:  Negative for cough, choking, chest tightness, shortness of breath and wheezing.    Cardiovascular:  Negative for chest pain, palpitations and leg swelling.   Gastrointestinal:  Negative for abdominal distention, abdominal pain, blood in stool, constipation, diarrhea, nausea and vomiting.   Genitourinary:  Negative for difficulty urinating, dysuria, flank pain and hematuria.   Musculoskeletal:  Positive for arthralgias and back pain. Negative for gait problem, joint swelling and neck pain.   Skin:  Negative for rash and wound.   Allergic/Immunologic: Negative for environmental allergies and food allergies.   Neurological:  Negative for dizziness, seizures, syncope, weakness, light-headedness, numbness and headaches.   Hematological:  Does not bruise/bleed easily.   Psychiatric/Behavioral:  Positive for sleep disturbance. Negative for confusion. The patient is not nervous/anxious.        Exam:Comprehensive exam done. /80 (BP Location: Left arm, Patient Position: Sitting)   Pulse 66   Ht 5' 7" (1.702 m)   Wt 125 kg (275 lb 7.4 oz)   LMP 02/13/2014   SpO2 96% Comment: on room air at rest  BMI 43.14 kg/m²   Exam included Vitals as " listed  Constitutional: She is oriented to person, place, and time. She appears well-developed. No distress.   Nose: Nose normal.   Mouth/Throat: Uvula is midline, oropharynx is clear and moist and mucous membranes are normal. No dental caries. No oropharyngeal exudate, posterior oropharyngeal edema, posterior oropharyngeal erythema or tonsillar abscesses.    Eyes: Pupils are equal, round, and reactive to light.   Neck: No JVD present. No thyromegaly present.   Cardiovascular: Normal rate, regular rhythm and normal heart sounds. Exam reveals no gallop and no friction rub.   No murmur heard.  Pulmonary/Chest: Effort normal and breath sounds normal. No accessory muscle usage or stridor. No apnea and no tachypnea. No respiratory distress, decreased breath sounds, wheezes, rhonchi, rales, or tenderness.   Abdominal: Soft. She exhibits no mass. There is no tenderness. No hepatosplenomegaly, hernias and normoactive bowel sounds  Musculoskeletal: Normal range of motion. exhibits no edema.   Neurological:  alert and oriented to person, place, and time. not disoriented.   Skin: Skin is warm and dry. Capillary refill takes less 2 sec. No cyanosis or erythema. No pallor. Nails show no clubbing.   Psychiatric: normal mood and affect. behavior is normal. Judgment and thought content normal.       Radiographs (ct chest and cxr) reviewed: was done by direct view   Patient imaging studies were reviewed and interpreted independently. My personal interpretation of most recent images include:    CT Chest - 5/7/2021 - Small lung nodules 2 mm to LLL and 5 mm pleural based to RUL.      Labs Patient's labs were reviewed including CBC and CMP    Lab Results   Component Value Date    WBC 7.73 01/02/2025    RBC 4.39 01/02/2025    HGB 13.1 01/02/2025    HCT 42.1 01/02/2025    MCV 96 01/02/2025    MCH 29.8 01/02/2025    MCHC 31.1 (L) 01/02/2025    RDW 13.9 01/02/2025     01/02/2025    MPV 11.7 01/02/2025    GRAN 4.8 01/02/2025    GRAN  61.9 01/02/2025    LYMPH 2.4 01/02/2025    LYMPH 30.4 01/02/2025    MONO 0.5 01/02/2025    MONO 5.8 01/02/2025    EOS 0.1 01/02/2025    BASO 0.03 01/02/2025    EOSINOPHIL 1.2 01/02/2025    BASOPHIL 0.4 01/02/2025   CMP  Sodium   Date Value Ref Range Status   01/02/2025 142 136 - 145 mmol/L Final     Potassium   Date Value Ref Range Status   01/02/2025 4.0 3.5 - 5.1 mmol/L Final     Chloride   Date Value Ref Range Status   01/02/2025 106 95 - 110 mmol/L Final     CO2   Date Value Ref Range Status   01/02/2025 27 23 - 29 mmol/L Final     Glucose   Date Value Ref Range Status   01/02/2025 86 70 - 110 mg/dL Final     BUN   Date Value Ref Range Status   01/02/2025 15 6 - 20 mg/dL Final     Creatinine   Date Value Ref Range Status   01/02/2025 0.7 0.5 - 1.4 mg/dL Final   08/19/2013 0.8 0.5 - 1.4 mg/dL Final     Calcium   Date Value Ref Range Status   01/02/2025 9.5 8.7 - 10.5 mg/dL Final   08/19/2013 9.5 8.7 - 10.5 mg/dL Final     Total Protein   Date Value Ref Range Status   01/02/2025 7.3 6.0 - 8.4 g/dL Final     Albumin   Date Value Ref Range Status   01/02/2025 3.6 3.5 - 5.2 g/dL Final     Total Bilirubin   Date Value Ref Range Status   01/02/2025 0.6 0.1 - 1.0 mg/dL Final     Comment:     For infants and newborns, interpretation of results should be based  on gestational age, weight and in agreement with clinical  observations.    Premature Infant recommended reference ranges:  Up to 24 hours.............<8.0 mg/dL  Up to 48 hours............<12.0 mg/dL  3-5 days..................<15.0 mg/dL  6-29 days.................<15.0 mg/dL       Alkaline Phosphatase   Date Value Ref Range Status   01/02/2025 123 40 - 150 U/L Final   08/19/2013 114 55 - 135 U/L Final     AST   Date Value Ref Range Status   01/02/2025 16 10 - 40 U/L Final   08/19/2013 15 10 - 40 U/L Final     ALT   Date Value Ref Range Status   01/02/2025 20 10 - 44 U/L Final     Anion Gap   Date Value Ref Range Status   01/02/2025 9 8 - 16 mmol/L Final    08/19/2013 10 5 - 15 meq/L Final     eGFR   Date Value Ref Range Status   01/02/2025 >60.0 >60 mL/min/1.73 m^2 Final         PFT was not done  Pulmonary Functions Testing Results:        Plan:  Clinical impression is resonably certain and repeated evaluation prn +/- follow up will be needed as below.    Leandra was seen today for sleep apnea.    Diagnoses and all orders for this visit:    SADIE (obstructive sleep apnea)  -     Cancel: CPAP/BIPAP SUPPLIES  -     CPAP/BIPAP SUPPLIES    History of breast cancer    Class 3 severe obesity due to excess calories with serious comorbidity and body mass index (BMI) of 40.0 to 44.9 in adult    Body mass index is 43.14 kg/m². Morbid obesity complicates all aspects of disease management from diagnostic modalities to treatment. Weight loss encouraged and health benefits explained to patient. Nutritional counseling and physical activity encouraged.       Follow up in about 1 year (around 2/21/2026), or if symptoms worsen or fail to improve.    Discussed with patient above for education the following:      Patient Instructions   Overall BIPAP compliance report shows great compliance!    Continue BIPAP nightly.    BIPAP supplies sent to [facility] - this order should be good for one year.    Can pre-cautiously check CT Chest to further monitor small lung nodules - you wish to hold off at this time.     Continue current prescription medication regiment. Keep follow up appointment as scheduled. Please call the office if you have any questions or concerns.              [1]  Social History  Tobacco Use    Smoking status: Never    Smokeless tobacco: Never   Substance Use Topics    Alcohol use: Not Currently     Comment: rarely    Drug use: No

## 2025-04-07 ENCOUNTER — OFFICE VISIT (OUTPATIENT)
Dept: FAMILY MEDICINE | Facility: CLINIC | Age: 60
End: 2025-04-07
Payer: COMMERCIAL

## 2025-04-07 VITALS
SYSTOLIC BLOOD PRESSURE: 132 MMHG | WEIGHT: 278.25 LBS | HEART RATE: 82 BPM | TEMPERATURE: 99 F | BODY MASS INDEX: 43.67 KG/M2 | DIASTOLIC BLOOD PRESSURE: 62 MMHG | HEIGHT: 67 IN | OXYGEN SATURATION: 97 %

## 2025-04-07 DIAGNOSIS — H10.11 ALLERGIC CONJUNCTIVITIS OF RIGHT EYE: Primary | ICD-10-CM

## 2025-04-07 PROCEDURE — 3044F HG A1C LEVEL LT 7.0%: CPT | Mod: CPTII,S$GLB,, | Performed by: PHYSICIAN ASSISTANT

## 2025-04-07 PROCEDURE — 3075F SYST BP GE 130 - 139MM HG: CPT | Mod: CPTII,S$GLB,, | Performed by: PHYSICIAN ASSISTANT

## 2025-04-07 PROCEDURE — 99999 PR PBB SHADOW E&M-EST. PATIENT-LVL IV: CPT | Mod: PBBFAC,,, | Performed by: PHYSICIAN ASSISTANT

## 2025-04-07 PROCEDURE — 3078F DIAST BP <80 MM HG: CPT | Mod: CPTII,S$GLB,, | Performed by: PHYSICIAN ASSISTANT

## 2025-04-07 PROCEDURE — 3008F BODY MASS INDEX DOCD: CPT | Mod: CPTII,S$GLB,, | Performed by: PHYSICIAN ASSISTANT

## 2025-04-07 PROCEDURE — 1160F RVW MEDS BY RX/DR IN RCRD: CPT | Mod: CPTII,S$GLB,, | Performed by: PHYSICIAN ASSISTANT

## 2025-04-07 PROCEDURE — 99213 OFFICE O/P EST LOW 20 MIN: CPT | Mod: S$GLB,,, | Performed by: PHYSICIAN ASSISTANT

## 2025-04-07 PROCEDURE — 1159F MED LIST DOCD IN RCRD: CPT | Mod: CPTII,S$GLB,, | Performed by: PHYSICIAN ASSISTANT

## 2025-04-07 RX ORDER — POLYMYXIN B SULFATE AND TRIMETHOPRIM 1; 10000 MG/ML; [USP'U]/ML
1 SOLUTION OPHTHALMIC EVERY 6 HOURS
Qty: 10 ML | Refills: 0 | Status: CANCELLED | OUTPATIENT
Start: 2025-04-07 | End: 2025-04-14

## 2025-04-07 RX ORDER — OLOPATADINE HYDROCHLORIDE 1 MG/ML
1 SOLUTION/ DROPS OPHTHALMIC 2 TIMES DAILY
Qty: 5 ML | Refills: 2 | Status: SHIPPED | OUTPATIENT
Start: 2025-04-07

## 2025-04-07 NOTE — PROGRESS NOTES
OFFICE VISIT   4/7/2025    Patient ID: Leandra Lopes is a 59 y.o. female    SUBJECTIVE:  No chief complaint on file.      HPI/ROS:  History of Present Illness    CHIEF COMPLAINT:  Patient presents today for concerns about pink eye.    HISTORY OF PRESENT ILLNESS:  She presents with right eye symptoms that started yesterday morning. The eye was bright red this morning but has since improved. She experiences sensation of pain behind the eye, intermittent itching, and a gritty sensation. She denies eye movement pain, mucus, discharge, or excessively watery eyes.    OCULAR HISTORY:  She underwent cataract surgery last summer. She does not wear contact lenses.    ALLERGIES:  She reports mild seasonal allergies.         Review of Systems   Constitutional:  Negative for chills and fever.   HENT:  Negative for congestion and rhinorrhea.    Eyes:  Positive for pain, redness and itching. Negative for photophobia, discharge and visual disturbance.   Allergic/Immunologic: Positive for environmental allergies.   Neurological:  Negative for headaches.       Past Medical History:   Diagnosis Date    Breast CA     Breast cancer 8/2013    left breast infiltrating ductal carcinoma, ER/[place] positive, Her2 negative    Difficult intravenous access     patient is a hard iv stick    Lung nodules     Needle phobia     Obese     SADIE (obstructive sleep apnea)     on cpap       Social History[1]    Past Surgical History:   Procedure Laterality Date    BREAST BIOPSY  08/2013    left breast- IDC    BREAST SURGERY  10/16/2013    bilateral mastectomy and reconstruction-no implants    CATARACT EXTRACTION W/  INTRAOCULAR LENS IMPLANT Left 6/6/2024    Procedure: EXTRACTION, CATARACT, WITH IOL INSERTION;  Surgeon: Naman Gaston MD;  Location: [facility];  Service: Ophthalmology;  Laterality: Left;    CATARACT EXTRACTION W/  INTRAOCULAR LENS IMPLANT Right 7/18/2024    Procedure: EXTRACTION, CATARACT, WITH IOL INSERTION;  Surgeon: Naman Gaston MD;   "Location: [facility] OR;  Service: Ophthalmology;  Laterality: Right;     SECTION      x3    CHOLECYSTECTOMY      COLON SURGERY  2009    benign neoplasm recheck 2014, 12 inches    EPIDURAL STEROID INJECTION INTO LUMBAR SPINE N/A 2019    Procedure: Injection-steroid-epidural-lumbar;  Surgeon: Niko Meek MD;  Location: [facility] OR;  Service: Pain Management;  Laterality: N/A;  L2-L3    EPIDURAL STEROID INJECTION INTO LUMBAR SPINE N/A 10/20/2020    Procedure: Injection-steroid-epidural-lumbar;  Surgeon: Niko Meek MD;  Location: [facility] OR;  Service: Pain Management;  Laterality: N/A;  L2-3   Pt wanted to let ASC know she is a hard stick     HERNIA REPAIR  2013    HYSTERECTOMY      TVH/BSO, benign    TONSILLECTOMY      TUBAL LIGATION         OBJECTIVE:    /62 (BP Location: Right forearm, Patient Position: Sitting)   Pulse 82   Temp 98.6 °F (37 °C) (Oral)   Ht 5' 7" (1.702 m)   Wt 126.2 kg (278 lb 3.5 oz)   LMP 2014   SpO2 97%   BMI 43.58 kg/m²     Current Medications[2]    Physical Exam  Constitutional:       General: She is not in acute distress.     Appearance: Normal appearance. She is normal weight. She is not ill-appearing.   HENT:      Head: Normocephalic and atraumatic.      Right Ear: External ear normal.      Left Ear: External ear normal.      Nose: Nose normal.   Eyes:      General: Lids are normal.         Right eye: No discharge or hordeolum.         Left eye: No discharge or hordeolum.      Extraocular Movements: Extraocular movements intact.      Right eye: Normal extraocular motion.      Left eye: Normal extraocular motion.      Conjunctiva/sclera:      Right eye: Right conjunctiva is injected. No exudate.     Left eye: No exudate.     Pupils: Pupils are equal, round, and reactive to light.   Cardiovascular:      Rate and Rhythm: Normal rate and regular rhythm.      Pulses: Normal pulses.      Heart sounds: Normal heart sounds.   Pulmonary:      Effort: Pulmonary effort is normal. "      Breath sounds: Normal breath sounds.   Musculoskeletal:         General: No swelling. Normal range of motion.      Cervical back: Normal range of motion and neck supple.   Skin:     General: Skin is warm and dry.   Neurological:      General: No focal deficit present.      Mental Status: She is alert and oriented to person, place, and time. Mental status is at baseline.   Psychiatric:         Mood and Affect: Mood normal.         Behavior: Behavior normal.         Thought Content: Thought content normal.         Judgment: Judgment normal.         Assessment/Plan:  Assessment & Plan    H10.11 Acute atopic conjunctivitis, right eye  J30.2 Other seasonal allergic rhinitis    IMPRESSION:  - Assessed eye symptoms, considering differential diagnoses of bacterial, allergic, and viral conjunctivitis.  - Ruled out typical bacterial conjunctivitis due to absence of discharge and crusting.  - Suspect possible allergic or viral etiology based on presenting symptoms and lack of typical bacterial signs.  - Brief eye exam performed in office.    ACUTE ATOPIC CONJUNCTIVITIS, RIGHT EYE:  - Examined the patient's eye and noted no swelling of the eyelids.  - The eye appeared red in the center but not all around.  - Performed a physical exam by pulling down the eyelid.  - Assessed that the condition is likely allergic or viral conjunctivitis rather than bacterial, based on the lack of discharge and the presentation of symptoms.  - Explained different types of conjunctivitis (bacterial, allergic, viral) and their typical presentations.  - Discussed that bacterial conjunctivitis usually presents with significant discharge and crusting.  - Prescribed Pataday eye drops, 1 drop in affected eye twice daily, considering the possibility of an allergic component.  - Recommend OTC artificial tears or lubricating eye drops for comfort as needed.  - Advised to apply cool compresses to affected eye for comfort.  - Patient to practice good hand  hygiene.    SEASONAL ALLERGIC RHINITIS:  - Performed a respiratory exam and found no respiratory symptoms.  - Considered the possibility of allergic conjunctivitis related to seasonal allergies.    FOLLOW-UP:  - Recommend follow-up if symptoms worsen or if discharge develops, with the possibility of prescribing antibiotic drops if needed if symptoms progress or worsen.  - Patient has an appointment with her optometrist scheduled for next week.          Problem List Items Addressed This Visit    None  Visit Diagnoses         Allergic conjunctivitis of right eye    -  Primary    Relevant Medications    olopatadine (PATANOL) 0.1 % ophthalmic solution            Patient verbalizes understanding of instructions and healthcare topics reviewed. All of patient's questions were answered.  Patient encouraged to contact office if other questions arise.    Health Maintenance Due   Topic Date Due    Pneumococcal Vaccines (Age 50+) (3 of 3 - PCV20 or PCV21) 03/25/2022       Follow up if symptoms worsen or fail to improve.    This note was generated with the assistance of ambient listening technology. Verbal consent was obtained by the patient and accompanying visitor(s) for the recording of patient appointment to facilitate this note. I attest to having reviewed and edited the generated note for accuracy, though some syntax or spelling errors may persist. Please contact the author of this note for any clarification.       Johnna Wilder PA-C  Family Medicine Physician Assistant          [1]   Social History  Tobacco Use    Smoking status: Never    Smokeless tobacco: Never   Substance Use Topics    Alcohol use: Not Currently     Comment: rarely    Drug use: No   [2]   Current Outpatient Medications:     CREON 24,000-76,000 -120,000 unit capsule, Take 1 capsule by mouth once daily., Disp: , Rfl:     cyanocobalamin-cobamamide (B12) 5,000-100 mcg Lozg, , Disp: , Rfl:     doxycycline (MONODOX) 50 MG Cap, TAKE 1 CAPSULE BY  MOUTH EVERY DAY AS NEEDED FOR ROSACEA FLARE-UPS, Disp: , Rfl:     glucosam-chond-hyalu-CF borate 750 mg-100 mg- 1.65 mg-108 mg Tab, Take 2 tablets by mouth once daily., Disp: , Rfl:     LIDOcaine (LIDODERM) 5 %, Place 1 patch onto the skin once daily. Remove & Discard patch within 12 hours or as directed by MD, Disp: 30 patch, Rfl: 11    metronidazole 1% (METROGEL) 1 % Gel, Apply 1 application  topically., Disp: , Rfl:     multivitamin capsule, Take 1 capsule by mouth once daily., Disp: , Rfl:     SOOLANTRA 1 % Crea, , Disp: , Rfl:     zinc sulfate (ZINC-15 ORAL), Take by mouth., Disp: , Rfl:     albuterol (PROVENTIL HFA) 90 mcg/actuation inhaler, Inhale 2 puffs into the lungs every 6 (six) hours as needed for Wheezing. Rescue, Disp: 6.7 g, Rfl: 0    cetirizine (ZYRTEC) 10 MG tablet, Take 1 tablet (10 mg total) by mouth once daily., Disp: 30 tablet, Rfl: 0    olopatadine (PATANOL) 0.1 % ophthalmic solution, Place 1 drop into the right eye 2 (two) times daily., Disp: 5 mL, Rfl: 2    ondansetron (ZOFRAN-ODT) 4 MG TbDL, Take 1 tablet (4 mg total) by mouth every 6 (six) hours as needed (nausea)., Disp: 20 tablet, Rfl: 0    prednisolon/gatiflox/bromfenac (PREDNISOL ACE-GATIFLOX-BROMFEN) 1-0.5-0.075 % DrpS, Apply 1 drop to eye 3 (three) times daily. in operative eye for 1 month after surgery, Disp: 5 mL, Rfl: 3    Current Facility-Administered Medications:     midazolam injection 1 mg, 1 mg, Intravenous, On Call Procedure, Naman Gaston MD    sodium chloride 0.9% flush 10 mL, 10 mL, Intravenous, PRN, Naman Gaston MD    sodium chloride 0.9% flush 10 mL, 10 mL, Intravenous, PRN, Naman Gaston MD

## 2025-04-14 ENCOUNTER — OFFICE VISIT (OUTPATIENT)
Dept: OPTOMETRY | Facility: CLINIC | Age: 60
End: 2025-04-14
Payer: COMMERCIAL

## 2025-04-14 DIAGNOSIS — H11.31 SUBCONJUNCTIVAL HEMORRHAGE, NON-TRAUMATIC, RIGHT: Primary | ICD-10-CM

## 2025-04-14 PROCEDURE — 3044F HG A1C LEVEL LT 7.0%: CPT | Mod: CPTII,S$GLB,, | Performed by: OPTOMETRIST

## 2025-04-14 PROCEDURE — 99999 PR PBB SHADOW E&M-EST. PATIENT-LVL III: CPT | Mod: PBBFAC,,, | Performed by: OPTOMETRIST

## 2025-04-14 PROCEDURE — 1159F MED LIST DOCD IN RCRD: CPT | Mod: CPTII,S$GLB,, | Performed by: OPTOMETRIST

## 2025-04-14 PROCEDURE — 99213 OFFICE O/P EST LOW 20 MIN: CPT | Mod: S$GLB,,, | Performed by: OPTOMETRIST

## 2025-04-14 NOTE — PATIENT INSTRUCTIONS
"DRY EYES -- BURNING OR DARY SYMPTOMS:  Use Over The Counter artificial tears as needed for dry eye symptoms.   Some common brands include:  Systane, Optive, Refresh, and Thera-Tears.  These drops can be used as frequently as desired, but may be most helpful use during long periods of concentrated work.  For example, reading / working at the computer. Start with 3-4x per day.     Nighttime Ophthalmic gel or ointments are available: Refresh PM, Genteal, and Lacrilube.    Avoid drops that "get redness out" (Visine, Murine, Clear Eyes), as these may contain medication that could further irritate the eyes, especially with chronic use.    ALLERGY EYES -- ITCHING SYMPTOMS:  Over the counter medications include--Pataday, Zaditor, and Alaway.  Use as directed 1-2 drops daily for symptoms of itching / watering eyes.  These drops will not help for dry eye or exposure symptoms.    REDNESS RELIEF:  Lumify---is a good redness reliever that will not cause irritation if used chronically.          "

## 2025-04-14 NOTE — PROGRESS NOTES
HPI    Pt presents today for a problem.  Pt states x 1 wk ago, woke up w/ red OD.  Pt went to [facility] that Monday morning.   Pt was given olopatadine (PATANOL) to use bid OD, pt d/c gtt yesterday.  Pt c/o still itchy and red.  Denies ocular changes.     Last edited by Pratima Freitas on 4/14/2025  1:53 PM.            Assessment /Plan     For exam results, see Encounter Report.    Subconjunctival hemorrhage, non-traumatic, right      OD --x 1 week --resolving per history and appearance   Probable 2/2 heavy lifting last weekend   Not contagious/ not infectious     No blood thinners   Reassured  Monitor BP     Message if recurrence, schedule for annual in August 2025

## 2025-05-29 ENCOUNTER — TELEPHONE (OUTPATIENT)
Dept: DERMATOLOGY | Facility: CLINIC | Age: 60
End: 2025-05-29
Payer: COMMERCIAL

## 2025-05-29 NOTE — TELEPHONE ENCOUNTER
Called and offered patient first available. Patient declined    ----- Message from Nichole sent at 5/29/2025  1:38 PM CDT -----  Regarding: Call Back  Contact: Pt [phone]  Pt is calling to speak to staff states half of her nail is broken off; pt states she missed a call back and she is returning a call please call

## 2025-05-30 ENCOUNTER — OFFICE VISIT (OUTPATIENT)
Dept: URGENT CARE | Facility: CLINIC | Age: 60
End: 2025-05-30
Payer: COMMERCIAL

## 2025-05-30 VITALS
HEIGHT: 67 IN | RESPIRATION RATE: 18 BRPM | OXYGEN SATURATION: 96 % | TEMPERATURE: 97 F | HEART RATE: 70 BPM | DIASTOLIC BLOOD PRESSURE: 81 MMHG | BODY MASS INDEX: 43.54 KG/M2 | SYSTOLIC BLOOD PRESSURE: 117 MMHG | WEIGHT: 277.38 LBS

## 2025-05-30 DIAGNOSIS — L03.012 PARONYCHIA OF FINGER, LEFT: ICD-10-CM

## 2025-05-30 DIAGNOSIS — B35.1 ONYCHOMYCOSIS: Primary | ICD-10-CM

## 2025-05-30 DIAGNOSIS — L60.9 FINGERNAIL PROBLEM: ICD-10-CM

## 2025-05-30 RX ORDER — CEPHALEXIN 500 MG/1
500 CAPSULE ORAL 4 TIMES DAILY
Qty: 28 CAPSULE | Refills: 0 | Status: SHIPPED | OUTPATIENT
Start: 2025-05-30 | End: 2025-06-06

## 2025-05-30 RX ORDER — KETOCONAZOLE 20 MG/G
CREAM TOPICAL DAILY
Qty: 30 G | Refills: 0 | Status: SHIPPED | OUTPATIENT
Start: 2025-05-30

## 2025-05-30 NOTE — PATIENT INSTRUCTIONS
Keep area clean and dry.     Warm water soaks twice per per day to promote antibiotic which blood to the area.    Antifungal cream to affected area    Oral antibiotics as prescribed for the full course.      If symptoms do not improve please follow up with Dermatology or return to clinic for re-evaluation

## 2025-05-30 NOTE — PROGRESS NOTES
Subjective:      Patient ID: Leandra Lopes is a 59 y.o. female.    Vitals:  vitals were not taken for this visit.     Chief Complaint: No chief complaint on file.    HPI  ROS   Objective:     Physical Exam    Assessment:     No diagnosis found.    Plan:       There are no diagnoses linked to this encounter.

## 2025-05-30 NOTE — PROGRESS NOTES
"Subjective:      Patient ID: Leandra Lopes is a 59 y.o. female.    Vitals:  height is 5' 7" (1.702 m) and weight is 125.8 kg (277 lb 6.4 oz). Her oral temperature is 97.4 °F (36.3 °C). Her blood pressure is 117/81 and her pulse is 70. Her respiration is 18 and oxygen saturation is 96%.     Chief Complaint: Nail Problem    Patient isn't experiencing no pain or discomfort to the area.  She just noticed some tenderness and swelling to the cuticle of the left 3rd finger as well as part of nail was missing which she 1st noticed today.    Nail Problem  This is a new problem. The current episode started 1 to 4 weeks ago. The problem has been gradually worsening. Pertinent negatives include no chills, fatigue or fever. Associated symptoms comments: Nail missing and swollen finger. Nothing aggravates the symptoms. She has tried nothing for the symptoms.       Constitution: Negative for chills, fatigue and fever.   Skin:  Positive for erythema and avulsion (Partial nail avulsion, nontraumatic).        Paronychia left 3rd finger      Objective:     Physical Exam   Constitutional: She is oriented to person, place, and time. She appears well-developed.   HENT:   Head: Normocephalic and atraumatic. Head is without abrasion, without contusion and without laceration.   Mouth/Throat: Oropharynx is clear and moist and mucous membranes are normal.   Eyes: EOM and lids are normal.   Neck: Phonation normal. Neck supple.   Cardiovascular: Normal rate.   Pulmonary/Chest: Effort normal. No respiratory distress.   Musculoskeletal: Normal range of motion.         General: Normal range of motion.      Left hand: Left middle finger: Exhibits tenderness and swelling. Lt Middle Finger - Injuries: Nail avulsion partial nontraumatic.   Neurological: She is alert and oriented to person, place, and time. She displays no weakness.   Skin: Skin is warm, dry, intact and no rash. Capillary refill takes less than 2 seconds. erythema No abrasion, No burn, " No bruising and No ecchymosis   Psychiatric: Her speech is normal and behavior is normal. Mood, judgment and thought content normal.   Nursing note and vitals reviewed.            Assessment:     1. Onychomycosis    2. Fingernail problem    3. Paronychia of finger, left        Plan:       Onychomycosis  -     ketoconazole (NIZORAL) 2 % cream; Apply topically once daily.  Dispense: 30 g; Refill: 0    Fingernail problem    Paronychia of finger, left  -     cephALEXin (KEFLEX) 500 MG capsule; Take 1 capsule (500 mg total) by mouth 4 (four) times daily. for 7 days  Dispense: 28 capsule; Refill: 0      Discussed with patient I would recommend warm saltwater soaks and keeping area clean and dry.    Advised follow up with dermatology if symptoms do not improve    Discussed medication with patient who acknowledges understanding and is agreeable to POC. Follow up with primary care. Increase fluid intake. Red flags for ER discussed.

## 2025-07-11 ENCOUNTER — OFFICE VISIT (OUTPATIENT)
Dept: FAMILY MEDICINE | Facility: CLINIC | Age: 60
End: 2025-07-11
Payer: COMMERCIAL

## 2025-07-11 VITALS
HEART RATE: 67 BPM | SYSTOLIC BLOOD PRESSURE: 116 MMHG | DIASTOLIC BLOOD PRESSURE: 70 MMHG | BODY MASS INDEX: 43.29 KG/M2 | HEIGHT: 67 IN | WEIGHT: 275.81 LBS | TEMPERATURE: 99 F | OXYGEN SATURATION: 98 %

## 2025-07-11 DIAGNOSIS — B35.4 TINEA CORPORIS: Primary | ICD-10-CM

## 2025-07-11 PROCEDURE — 99999 PR PBB SHADOW E&M-EST. PATIENT-LVL V: CPT | Mod: PBBFAC,,, | Performed by: NURSE PRACTITIONER

## 2025-07-11 RX ORDER — CLOTRIMAZOLE 1 %
CREAM (GRAM) TOPICAL 2 TIMES DAILY
Qty: 113 G | Refills: 0 | Status: SHIPPED | OUTPATIENT
Start: 2025-07-11

## 2025-07-11 RX ORDER — TERBINAFINE HYDROCHLORIDE 250 MG/1
250 TABLET ORAL DAILY
Qty: 14 TABLET | Refills: 0 | Status: CANCELLED | OUTPATIENT
Start: 2025-07-11 | End: 2025-07-25

## 2025-07-11 NOTE — PROGRESS NOTES
Subjective:       Patient ID: Leandra Lopes is a 59 y.o. female.    Chief Complaint: possible ringworm on right wrist and fungus on left middle finger     HPI   60 y/o female patient with medical problems listed below presents for a possible ringworm infection on the right volar forearm, present for approximately 5 days. She describes the rash as itchy and notes that her daughter has a similar rash.  Problem List[1]     Review of patient's allergies indicates:   Allergen Reactions    Morphine Shortness Of Breath    Adhesive Rash     AGENT: Patient thinks it was PAPER TAPE    Aspirin Other (See Comments)     Stomach pain    Penicillin g Other (See Comments)     Stomach pain    Morphine sulfate      Past Surgical History:   Procedure Laterality Date    BREAST BIOPSY  2013    left breast- ID[facility]    BREAST SURGERY  10/16/2013    bilateral mastectomy and reconstruction-no implants    CATARACT EXTRACTION W/  INTRAOCULAR LENS IMPLANT Left 2024    Procedure: EXTRACTION, CATARACT, WITH IOL INSERTION;  Surgeon: Naman Gaston MD;  Location: [facility] OR;  Service: Ophthalmology;  Laterality: Left;    CATARACT EXTRACTION W/  INTRAOCULAR LENS IMPLANT Right 2024    Procedure: EXTRACTION, CATARACT, WITH IOL INSERTION;  Surgeon: Naman Gaston MD;  Location: [facility] OR;  Service: Ophthalmology;  Laterality: Right;     SECTION      x3    CHOLECYSTECTOMY      COLON SURGERY  2009    benign neoplasm recheck 2014, 12 inches    EPIDURAL STEROID INJECTION INTO LUMBAR SPINE N/A 2019    Procedure: Injection-steroid-epidural-lumbar;  Surgeon: Niko Meek MD;  Location: [facility] OR;  Service: Pain Management;  Laterality: N/A;  L2-L3    EPIDURAL STEROID INJECTION INTO LUMBAR SPINE N/A 10/20/2020    Procedure: Injection-steroid-epidural-lumbar;  Surgeon: Niko Meek MD;  Location: [facility] OR;  Service: Pain Management;  Laterality: N/A;  L2-3   Pt wanted to let ASC know she is a hard stick     HERNIA REPAIR  2013    HYSTERECTOMY    "   TVH/BSO, benign    TONSILLECTOMY      TUBAL LIGATION        Current Medications[2]    Review of Systems   Constitutional:  Negative for chills and fever.   Respiratory:  Negative for cough and shortness of breath.    Cardiovascular:  Negative for chest pain and palpitations.   Skin:  Positive for rash.   Neurological:  Negative for dizziness and headaches.       Objective:   /70 (BP Location: Left arm, Patient Position: Sitting)   Pulse 67   Temp 98.5 °F (36.9 °C) (Oral)   Ht 5' 7" (1.702 m)   Wt 125.1 kg (275 lb 12.7 oz)   LMP 02/13/2014   SpO2 98%   BMI 43.20 kg/m²         Physical Exam  Constitutional:       General: She is not in acute distress.     Appearance: Normal appearance.   HENT:      Head: Atraumatic.   Cardiovascular:      Rate and Rhythm: Normal rate and regular rhythm.      Pulses: Normal pulses.      Heart sounds: Normal heart sounds.   Pulmonary:      Effort: Pulmonary effort is normal.      Breath sounds: Normal breath sounds.   Skin:     Findings: Rash present.      Comments: +a erythematous annular lesion with raised border           Assessment:       1. Tinea corporis        Plan:       1. Tinea corporis (Primary)  - clotrimazole (LOTRIMIN) 1 % cream; Apply topically 2 (two) times daily.  Dispense: 113 g; Refill: 0     Patient with be reevaluated in 2 weeks or sooner ronda Traylor NP       [1]   Patient Active Problem List  Diagnosis    SADIE (obstructive sleep apnea)    Family history of malignant neoplasm of breast    Deformity of reconstructed breast    Lumbar radiculitis    Long term (current) use of aromatase inhibitors    Lung nodules    Spinal enthesopathy, lumbar region    Class 3 severe obesity without serious comorbidity in adult, unspecified BMI, unspecified obesity type   [2]   Current Outpatient Medications:     CREON 24,000-76,000 -120,000 unit capsule, Take 1 capsule by mouth once daily., Disp: , Rfl:     cyanocobalamin-cobamamide (B12) 5,000-100 mcg Julio Cesar, , Disp: , " Rfl:     doxycycline (MONODOX) 50 MG Cap, TAKE 1 CAPSULE BY MOUTH EVERY DAY AS NEEDED FOR ROSACEA FLARE-UPS, Disp: , Rfl:     ketoconazole (NIZORAL) 2 % cream, Apply topically once daily., Disp: 30 g, Rfl: 0    LIDOcaine (LIDODERM) 5 %, Place 1 patch onto the skin once daily. Remove & Discard patch within 12 hours or as directed by MD, Disp: 30 patch, Rfl: 11    multivitamin capsule, Take 1 capsule by mouth once daily., Disp: , Rfl:     zinc sulfate (ZINC-15 ORAL), Take by mouth., Disp: , Rfl:     albuterol (PROVENTIL HFA) 90 mcg/actuation inhaler, Inhale 2 puffs into the lungs every 6 (six) hours as needed for Wheezing. Rescue, Disp: 6.7 g, Rfl: 0    cetirizine (ZYRTEC) 10 MG tablet, Take 1 tablet (10 mg total) by mouth once daily., Disp: 30 tablet, Rfl: 0    clotrimazole (LOTRIMIN) 1 % cream, Apply topically 2 (two) times daily., Disp: 113 g, Rfl: 0    glucosam-chond-hyalu-CF borate 750 mg-100 mg- 1.65 mg-108 mg Tab, Take 2 tablets by mouth once daily. (Patient not taking: Reported on 5/30/2025), Disp: , Rfl:     metronidazole 1% (METROGEL) 1 % Gel, Apply 1 application  topically. (Patient not taking: Reported on 7/11/2025), Disp: , Rfl:     olopatadine (PATANOL) 0.1 % ophthalmic solution, Place 1 drop into the right eye 2 (two) times daily. (Patient not taking: Reported on 7/11/2025), Disp: 5 mL, Rfl: 2    ondansetron (ZOFRAN-ODT) 4 MG TbDL, Take 1 tablet (4 mg total) by mouth every 6 (six) hours as needed (nausea)., Disp: 20 tablet, Rfl: 0    prednisolon/gatiflox/bromfenac (PREDNISOL ACE-GATIFLOX-BROMFEN) 1-0.5-0.075 % DrpS, Apply 1 drop to eye 3 (three) times daily. in operative eye for 1 month after surgery, Disp: 5 mL, Rfl: 3    SOOLANTRA 1 % Crea, , Disp: , Rfl:     Current Facility-Administered Medications:     midazolam injection 1 mg, 1 mg, Intravenous, On Call Procedure, Naman Gaston MD    sodium chloride 0.9% flush 10 mL, 10 mL, Intravenous, PRN, Naman Gaston MD    sodium chloride 0.9% flush 10  mL, 10 mL, Intravenous, PRN, Naman Gaston MD

## 2025-07-25 ENCOUNTER — LAB VISIT (OUTPATIENT)
Dept: LAB | Facility: HOSPITAL | Age: 60
End: 2025-07-25
Attending: NURSE PRACTITIONER
Payer: COMMERCIAL

## 2025-07-25 ENCOUNTER — OFFICE VISIT (OUTPATIENT)
Dept: FAMILY MEDICINE | Facility: CLINIC | Age: 60
End: 2025-07-25
Payer: COMMERCIAL

## 2025-07-25 VITALS
WEIGHT: 272.69 LBS | HEIGHT: 67 IN | BODY MASS INDEX: 42.8 KG/M2 | HEART RATE: 69 BPM | SYSTOLIC BLOOD PRESSURE: 110 MMHG | OXYGEN SATURATION: 95 % | DIASTOLIC BLOOD PRESSURE: 70 MMHG | TEMPERATURE: 98 F

## 2025-07-25 DIAGNOSIS — B35.4 TINEA CORPORIS: Primary | ICD-10-CM

## 2025-07-25 DIAGNOSIS — F40.243 FEAR OF FLYING: ICD-10-CM

## 2025-07-25 DIAGNOSIS — B35.4 TINEA CORPORIS: ICD-10-CM

## 2025-07-25 LAB
ALBUMIN SERPL BCP-MCNC: 4.2 G/DL (ref 3.5–5.2)
ALP SERPL-CCNC: 124 UNIT/L (ref 40–150)
ALT SERPL W/O P-5'-P-CCNC: 40 UNIT/L (ref 0–55)
AST SERPL-CCNC: 32 UNIT/L (ref 0–50)
BILIRUB DIRECT SERPL-MCNC: 0.2 MG/DL (ref 0.1–0.3)
BILIRUB SERPL-MCNC: 0.7 MG/DL (ref 0.1–1)
PROT SERPL-MCNC: 7 GM/DL (ref 6–8.4)

## 2025-07-25 PROCEDURE — 99999 PR PBB SHADOW E&M-EST. PATIENT-LVL V: CPT | Mod: PBBFAC,,, | Performed by: NURSE PRACTITIONER

## 2025-07-25 PROCEDURE — 36415 COLL VENOUS BLD VENIPUNCTURE: CPT | Mod: PO

## 2025-07-25 PROCEDURE — 80076 HEPATIC FUNCTION PANEL: CPT

## 2025-07-25 RX ORDER — ALPRAZOLAM 0.25 MG/1
0.25 TABLET ORAL 2 TIMES DAILY PRN
Qty: 5 TABLET | Refills: 0 | Status: CANCELLED | OUTPATIENT
Start: 2025-07-25

## 2025-07-25 RX ORDER — ALPRAZOLAM 0.25 MG/1
0.25 TABLET ORAL 2 TIMES DAILY PRN
Qty: 5 TABLET | Refills: 0 | Status: SHIPPED | OUTPATIENT
Start: 2025-07-25

## 2025-07-25 RX ORDER — MAGNESIUM ASCORBATE
POWDER (GRAM) MISCELLANEOUS
COMMUNITY

## 2025-07-25 RX ORDER — TERBINAFINE HYDROCHLORIDE 250 MG/1
250 TABLET ORAL DAILY
Qty: 14 TABLET | Refills: 0 | Status: SHIPPED | OUTPATIENT
Start: 2025-07-25 | End: 2025-08-08

## 2025-07-25 NOTE — PROGRESS NOTES
Subjective:       Patient ID: Leandra Lopes is a 59 y.o. female.    Chief Complaint: Follow-up     HPI   58 y/o female patient with medical problems listed below presents for 2 weeks follow up. Patient reports that her cat was recently diagnosed with a tinea infection by a . She was previously treated with a topical antifungal cream provided at her last clinic visit, which resulted in partial improvement. However, she continues to have a persistent rash on the right volar forearm and a new rash on the left volar forearm. She plans to travel to [place] on 2025 and requests medication to help manage anxiety related to flying.    Problem List[1]     Review of patient's allergies indicates:   Allergen Reactions    Morphine Shortness Of Breath    Adhesive Rash     AGENT: Patient thinks it was PAPER TAPE    Aspirin Other (See Comments)     Stomach pain    Penicillin g Other (See Comments)     Stomach pain    Morphine sulfate      Past Surgical History:   Procedure Laterality Date    BREAST BIOPSY  2013    left breast- [facility]    BREAST SURGERY  10/16/2013    bilateral mastectomy and reconstruction-no implants    CATARACT EXTRACTION W/  INTRAOCULAR LENS IMPLANT Left 2024    Procedure: EXTRACTION, CATARACT, WITH IOL INSERTION;  Surgeon: Naman Gaston MD;  Location: [facility] OR;  Service: Ophthalmology;  Laterality: Left;    CATARACT EXTRACTION W/  INTRAOCULAR LENS IMPLANT Right 2024    Procedure: EXTRACTION, CATARACT, WITH IOL INSERTION;  Surgeon: Naman Gaston MD;  Location: [facility] OR;  Service: Ophthalmology;  Laterality: Right;     SECTION      x3    CHOLECYSTECTOMY      COLON SURGERY  2009    benign neoplasm recheck 2014, 12 inches    EPIDURAL STEROID INJECTION INTO LUMBAR SPINE N/A 2019    Procedure: Injection-steroid-epidural-lumbar;  Surgeon: Niko Meek MD;  Location: [facility] OR;  Service: Pain Management;  Laterality: N/A;  L2-L3    EPIDURAL STEROID INJECTION INTO LUMBAR SPINE N/A  "10/20/2020    Procedure: Injection-steroid-epidural-lumbar;  Surgeon: Niko Meek MD;  Location: [facility] OR;  Service: Pain Management;  Laterality: N/A;  L2-3   Pt wanted to let ASC know she is a hard stick     HERNIA REPAIR  2013    HYSTERECTOMY      TVH/BSO, benign    TONSILLECTOMY      TUBAL LIGATION        Current Medications[2]    Review of Systems   Constitutional:  Negative for chills and fever.   Respiratory:  Negative for cough and shortness of breath.    Cardiovascular:  Negative for chest pain and palpitations.   Gastrointestinal:  Negative for abdominal pain.   Skin:  Positive for rash.   Neurological:  Negative for dizziness and headaches.       Objective:   /70 (BP Location: Left arm, Patient Position: Sitting)   Pulse 69   Temp 97.5 °F (36.4 °C) (Oral)   Ht 5' 7" (1.702 m)   Wt 123.7 kg (272 lb 11.3 oz)   LMP 02/13/2014   SpO2 95%   BMI 42.71 kg/m²         Physical Exam  Constitutional:       General: She is not in acute distress.     Appearance: Normal appearance.   HENT:      Head: Atraumatic.   Cardiovascular:      Rate and Rhythm: Normal rate and regular rhythm.      Pulses: Normal pulses.      Heart sounds: Normal heart sounds.   Pulmonary:      Effort: Pulmonary effort is normal.      Breath sounds: Normal breath sounds.   Abdominal:      General: Abdomen is flat. Bowel sounds are normal.      Palpations: Abdomen is soft.   Skin:     Findings: Rash present.   Neurological:      Mental Status: She is alert.                 Assessment:       1. Tinea corporis    2. Fear of flying        Plan:       1. Tinea corporis (Primary)  - terbinafine HCL (LAMISIL) 250 mg tablet; Take 1 tablet (250 mg total) by mouth once daily. for 14 days  Dispense: 14 tablet; Refill: 0  - Hepatic Function Panel; Future    2. Fear of flying  - ALPRAZolam (XANAX) 0.25 MG tablet; Take 1 tablet (0.25 mg total) by mouth 2 (two) times daily as needed for Anxiety.  Dispense: 5 tablet; Refill: 0     Patient with be " reevaluated in 2 weeks or sooner ronda Traylor NP       [1]   Patient Active Problem List  Diagnosis    SADIE (obstructive sleep apnea)    Family history of malignant neoplasm of breast    Deformity of reconstructed breast    Lumbar radiculitis    Long term (current) use of aromatase inhibitors    Lung nodules    Spinal enthesopathy, lumbar region    Class 3 severe obesity without serious comorbidity in adult, unspecified BMI, unspecified obesity type   [2]   Current Outpatient Medications:     clotrimazole (LOTRIMIN) 1 % cream, Apply topically 2 (two) times daily., Disp: 113 g, Rfl: 0    CREON 24,000-76,000 -120,000 unit capsule, Take 1 capsule by mouth once daily., Disp: , Rfl:     cyanocobalamin-cobamamide (B12) 5,000-100 mcg Lozg, , Disp: , Rfl:     doxycycline (MONODOX) 50 MG Cap, TAKE 1 CAPSULE BY MOUTH EVERY DAY AS NEEDED FOR ROSACEA FLARE-UPS, Disp: , Rfl:     ketoconazole (NIZORAL) 2 % cream, Apply topically once daily., Disp: 30 g, Rfl: 0    LIDOcaine (LIDODERM) 5 %, Place 1 patch onto the skin once daily. Remove & Discard patch within 12 hours or as directed by MD, Disp: 30 patch, Rfl: 11    magnesium ascorbate, bulk, Powd, by Misc.(Non-Drug; Combo Route) route., Disp: , Rfl:     multivitamin capsule, Take 1 capsule by mouth once daily., Disp: , Rfl:     ALPRAZolam (XANAX) 0.25 MG tablet, Take 1 tablet (0.25 mg total) by mouth 2 (two) times daily as needed for Anxiety., Disp: 5 tablet, Rfl: 0    cetirizine (ZYRTEC) 10 MG tablet, Take 1 tablet (10 mg total) by mouth once daily., Disp: 30 tablet, Rfl: 0    metronidazole 1% (METROGEL) 1 % Gel, Apply 1 application  topically. (Patient not taking: Reported on 5/30/2025), Disp: , Rfl:     olopatadine (PATANOL) 0.1 % ophthalmic solution, Place 1 drop into the right eye 2 (two) times daily. (Patient not taking: Reported on 5/30/2025), Disp: 5 mL, Rfl: 2    SOOLANTRA 1 % Crea, , Disp: , Rfl:     terbinafine HCL (LAMISIL) 250 mg tablet, Take 1 tablet (250 mg total) by  mouth once daily. for 14 days, Disp: 14 tablet, Rfl: 0    zinc sulfate (ZINC-15 ORAL), Take by mouth. (Patient not taking: Reported on 7/25/2025), Disp: , Rfl:     Current Facility-Administered Medications:     sodium chloride 0.9% flush 10 mL, 10 mL, Intravenous, PRN, Naman Gaston MD    sodium chloride 0.9% flush 10 mL, 10 mL, Intravenous, PRN, Naman Gaston MD

## 2025-08-21 ENCOUNTER — OFFICE VISIT (OUTPATIENT)
Dept: OPTOMETRY | Facility: CLINIC | Age: 60
End: 2025-08-21
Payer: COMMERCIAL

## 2025-08-21 DIAGNOSIS — Z96.1 BILATERAL PSEUDOPHAKIA: ICD-10-CM

## 2025-08-21 DIAGNOSIS — H26.492 POSTERIOR CAPSULAR OPACIFICATION NON VISUALLY SIGNIFICANT OF LEFT EYE: ICD-10-CM

## 2025-08-21 DIAGNOSIS — H10.829 ROSACEA BLEPHAROCONJUNCTIVITIS: ICD-10-CM

## 2025-08-21 DIAGNOSIS — Z13.5 GLAUCOMA SCREENING: ICD-10-CM

## 2025-08-21 DIAGNOSIS — H43.813 POSTERIOR VITREOUS DETACHMENT, BILATERAL: Primary | ICD-10-CM

## 2025-08-21 PROCEDURE — 99999 PR PBB SHADOW E&M-EST. PATIENT-LVL II: CPT | Mod: PBBFAC,,, | Performed by: OPTOMETRIST

## 2025-08-21 PROCEDURE — 92014 COMPRE OPH EXAM EST PT 1/>: CPT | Mod: S$GLB,,, | Performed by: OPTOMETRIST

## 2025-08-21 PROCEDURE — 92015 DETERMINE REFRACTIVE STATE: CPT | Mod: S$GLB,,, | Performed by: OPTOMETRIST

## 2025-08-21 PROCEDURE — 3044F HG A1C LEVEL LT 7.0%: CPT | Mod: CPTII,S$GLB,, | Performed by: OPTOMETRIST

## (undated) DEVICE — GLOVE SURG ULTRA TOUCH 6

## (undated) DEVICE — SYS LABEL CORRECT MED

## (undated) DEVICE — SYR DISP LL 5CC

## (undated) DEVICE — NDL SAFETY 25G X 1.5 ECLIPSE

## (undated) DEVICE — TUBING MINIBORE EXTENSION

## (undated) DEVICE — Device

## (undated) DEVICE — GLOVE SURG ULTRA TOUCH 7.5

## (undated) DEVICE — NDL TUOHY EPIDURAL 20G X 3.5

## (undated) DEVICE — SOL POVIDONE SCRUB IODINE 4 OZ

## (undated) DEVICE — SYR GLASS 5CC LUER LOK

## (undated) DEVICE — CHLORAPREP 10.5 ML APPLICATOR

## (undated) DEVICE — NDL HYPODERMIC BLUNT 18G 1.5IN

## (undated) DEVICE — SOL BETADINE 5%

## (undated) DEVICE — GLOVE SENSICARE PI SURG 7.5